# Patient Record
Sex: FEMALE | Race: WHITE | NOT HISPANIC OR LATINO | Employment: FULL TIME | URBAN - METROPOLITAN AREA
[De-identification: names, ages, dates, MRNs, and addresses within clinical notes are randomized per-mention and may not be internally consistent; named-entity substitution may affect disease eponyms.]

---

## 2019-04-03 ENCOUNTER — APPOINTMENT (OUTPATIENT)
Dept: WOMENS IMAGING | Facility: HOSPITAL | Age: 44
End: 2019-04-03

## 2019-04-03 PROCEDURE — 77067 SCR MAMMO BI INCL CAD: CPT | Performed by: RADIOLOGY

## 2020-05-04 ENCOUNTER — APPOINTMENT (OUTPATIENT)
Dept: WOMENS IMAGING | Facility: HOSPITAL | Age: 45
End: 2020-05-04

## 2020-05-04 PROCEDURE — 77067 SCR MAMMO BI INCL CAD: CPT | Performed by: RADIOLOGY

## 2021-06-03 ENCOUNTER — APPOINTMENT (OUTPATIENT)
Dept: WOMENS IMAGING | Facility: HOSPITAL | Age: 46
End: 2021-06-03

## 2021-06-03 PROCEDURE — 77067 SCR MAMMO BI INCL CAD: CPT | Performed by: RADIOLOGY

## 2022-04-27 ENCOUNTER — TELEMEDICINE (OUTPATIENT)
Dept: FAMILY MEDICINE CLINIC | Facility: CLINIC | Age: 47
End: 2022-04-27

## 2022-04-27 DIAGNOSIS — R05.9 COUGH: ICD-10-CM

## 2022-04-27 DIAGNOSIS — J30.2 SEASONAL ALLERGIC RHINITIS, UNSPECIFIED TRIGGER: Primary | ICD-10-CM

## 2022-04-27 PROCEDURE — 99213 OFFICE O/P EST LOW 20 MIN: CPT | Performed by: STUDENT IN AN ORGANIZED HEALTH CARE EDUCATION/TRAINING PROGRAM

## 2022-04-27 RX ORDER — TRIAMCINOLONE ACETONIDE 40 MG/ML
40 INJECTION, SUSPENSION INTRA-ARTICULAR; INTRAMUSCULAR ONCE
Status: DISCONTINUED | OUTPATIENT
Start: 2022-04-27 | End: 2023-01-19

## 2022-04-27 NOTE — ASSESSMENT & PLAN NOTE
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 Tylenol/Motrin for pain/fever. OTC cough and cold medication for symptoms. Nasal saline spray recommended. Cool mist humidifier at the bedside. RTC with new or worsening symptoms.

## 2022-04-27 NOTE — PROGRESS NOTES
Chief Complaint  No chief complaint on file.    Myrna Marte presents to [facility]  History of Present Illness    Unable to log into [employer] video visit.  Video visit was conducted with the use of [email or web address]    Patient states that she has been having severe allergies symptoms. She states that she has had cough, congestion, and sore throat. She has been using Mucinex, tylenol for the symptoms. Denies fever or chills. No known COVID 19 exposure. She has had her covid 19 vaccine, no booster.       Objective   Vital Signs:   There were no vitals taken for this visit.    There is no height or weight on file to calculate BMI.    Review of Systems    Past History:  Medical History: has a past medical history of Asthma, COPD (chronic obstructive pulmonary disease) (HCC), and Depression.   Surgical History: has a past surgical history that includes Gastrectomy.   Family History: family history includes COPD in an other family member; Diabetes in an other family member.   Social History: reports that she has been smoking. She does not have any smokeless tobacco history on file.    No current outpatient medications on file.    Allergies: Patient has no allergy information on record.    Physical Exam  Constitutional:       General: She is not in acute distress.     Appearance: She is not toxic-appearing.      Comments: Limited 2/2 Virtual visit.    HENT:      Head: Normocephalic and atraumatic.   Pulmonary:      Effort: Pulmonary effort is normal.      Comments: Speaking in complete sentences. No audible wheezing  Neurological:      Mental Status: She is alert and oriented to person, place, and time.   Psychiatric:         Mood and Affect: Mood normal.         Thought Content: Thought content normal.          Result Review :                   Assessment and Plan    Diagnoses and all orders for this visit:    1. 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 Tylenol/Motrin for pain/fever. OTC cough and cold medication for symptoms. Nasal saline spray recommended. Cool mist humidifier at the bedside. RTC with new or worsening symptoms.      Discussed limitations to virtual visit and patient was agreeable to continue. Discussed that because of the limitations of minimal physical exam that if there is interval worsening they should present to the office for either curbside visit, or to the emergency department for further evaluation and definitive management. Patient was agreeable to this.    I spent 13 minutes caring for Ashlee on this date of service. This time includes time spent by me in the following activities:preparing for the visit, obtaining and/or reviewing a separately obtained history, counseling and educating the patient/family/caregiver, ordering medications, tests, or procedures and documenting information in the medical record  Follow Up   No follow-ups on file.  Patient was given instructions and counseling regarding her condition or for health maintenance advice. Please see specific information pulled into the AVS if appropriate.     Carolyne Dixon, DO

## 2022-04-28 LAB
LABCORP SARS-COV-2, NAA 2 DAY TAT: NORMAL
SARS-COV-2 RNA RESP QL NAA+PROBE: NOT DETECTED

## 2022-05-03 ENCOUNTER — PATIENT MESSAGE (OUTPATIENT)
Dept: FAMILY MEDICINE CLINIC | Facility: CLINIC | Age: 47
End: 2022-05-03

## 2022-05-03 RX ORDER — ERGOCALCIFEROL 1.25 MG/1
CAPSULE ORAL
Qty: 12 CAPSULE | Refills: 0 | Status: SHIPPED | OUTPATIENT
Start: 2022-05-03 | End: 2023-02-21

## 2022-05-03 NOTE — TELEPHONE ENCOUNTER
VitD request, hasn't been seen since 09/21. No labs since 09/21. We need to recheck this level to make sure medication is needed. Wen said okay to send 1 RF but probably needs a physical for further.

## 2022-05-05 RX ORDER — AMOXICILLIN AND CLAVULANATE POTASSIUM 875; 125 MG/1; MG/1
1 TABLET, FILM COATED ORAL 2 TIMES DAILY
Qty: 20 TABLET | Refills: 0 | Status: SHIPPED | OUTPATIENT
Start: 2022-05-05 | End: 2022-08-15

## 2022-05-06 ENCOUNTER — OFFICE VISIT (OUTPATIENT)
Dept: FAMILY MEDICINE CLINIC | Facility: CLINIC | Age: 47
End: 2022-05-06

## 2022-05-06 DIAGNOSIS — R51.9 FACIAL PAIN: Primary | ICD-10-CM

## 2022-05-06 PROCEDURE — 99214 OFFICE O/P EST MOD 30 MIN: CPT | Performed by: FAMILY MEDICINE

## 2022-05-06 RX ORDER — HYDROCODONE BITARTRATE AND ACETAMINOPHEN 7.5; 325 MG/1; MG/1
1 TABLET ORAL EVERY 6 HOURS PRN
Qty: 12 TABLET | Refills: 0 | Status: SHIPPED | OUTPATIENT
Start: 2022-05-06 | End: 2022-08-15

## 2022-05-07 NOTE — PROGRESS NOTES
2Follow Up Office Visit      Date of Visit:  2022   Patient Name: Ashlee Marte  : 1975   MRN: [id number]     Chief Complaint:    Chief Complaint   Patient presents with   • Dental Pain       History of Present Illness: Ashlee Marte is a 46 y.o. female who is here today for evaluation of facial pain.  Not sure if this is related more to her jaw or tooth.  Present for the past few days.  HPI      Subjective      Review of Systems:   Review of Systems   Constitutional: Negative for fatigue and fever.   HENT: Positive for facial swelling. Negative for congestion and ear pain.    Respiratory: Negative for apnea, cough, chest tightness and shortness of breath.    Cardiovascular: Negative for chest pain.   Gastrointestinal: Negative for abdominal pain, constipation, diarrhea and nausea.   Musculoskeletal: Negative for arthralgias.   Psychiatric/Behavioral: Negative for depressed mood and stress.       Past Medical History:   Past Medical History:   Diagnosis Date   • Asthma    • COPD (chronic obstructive pulmonary disease) ([facility])    • Depression     MEDS       Past Surgical History:   Past Surgical History:   Procedure Laterality Date   • GASTRECTOMY      SLEEVE       Family History:   Family History   Problem Relation Age of Onset   • Diabetes Other    • COPD Other        Social History:   Social History     Socioeconomic History   • Marital status: Unknown   Tobacco Use   • Smoking status: Current Every Day Smoker       Medications:     Current Outpatient Medications:   •  amoxicillin-clavulanate (AUGMENTIN) 875-125 MG per tablet, Take 1 tablet by mouth 2 (Two) Times a Day., Disp: 20 tablet, Rfl: 0  •  vitamin D (ERGOCALCIFEROL) 1.25 MG (88687 UT) capsule capsule, TAKE 1 CAPSULE BY MOUTH  ONCE WEEKLY, Disp: 12 capsule, Rfl: 0  •  HYDROcodone-acetaminophen (NORCO) 7.5-325 MG per tablet, Take 1 tablet by mouth Every 6 (Six) Hours As Needed for Moderate Pain ., Disp: 12 tablet, Rfl: 0    Current  Facility-Administered Medications:   •  triamcinolone acetonide (KENALOG-40) injection 40 mg, 40 mg, Intramuscular, Once, Carolyne Dixon DO    Allergies:   No Known Allergies    Objective     Physical Exam:  Vital Signs: There were no vitals filed for this visit.  There is no height or weight on file to calculate BMI.     Physical Exam  Vitals and nursing note reviewed.   Constitutional:       General: She is not in acute distress.     Appearance: Normal appearance. She is not ill-appearing.   HENT:      Head: Normocephalic and atraumatic.      Right Ear: Tympanic membrane and ear canal normal.      Left Ear: Tympanic membrane and ear canal normal.      Nose: Nose normal.   Cardiovascular:      Rate and Rhythm: Normal rate and regular rhythm.      Heart sounds: Normal heart sounds.   Pulmonary:      Effort: Pulmonary effort is normal.      Breath sounds: Normal breath sounds.   Neurological:      Mental Status: She is alert and oriented to person, place, and time. Mental status is at baseline.   Psychiatric:         Mood and Affect: Mood normal.         Procedures    BMI has not been calculated during today's encounter.        Assessment / Plan      Assessment/Plan:   Diagnoses and all orders for this visit:    1. Facial pain (Primary)  -     HYDROcodone-acetaminophen (NORCO) 7.5-325 MG per tablet; Take 1 tablet by mouth Every 6 (Six) Hours As Needed for Moderate Pain .  Dispense: 12 tablet; Refill: 0         Medication given for facial pain.  On antibiotics in case it is related to sinuses or a tooth.  Follow-up with us in a few days if not improved.    Follow Up:   No follow-ups on file.    Tomy Landry  [facility] [place]

## 2022-05-10 ENCOUNTER — TELEPHONE (OUTPATIENT)
Dept: FAMILY MEDICINE CLINIC | Facility: CLINIC | Age: 47
End: 2022-05-10

## 2022-05-10 RX ORDER — FLUCONAZOLE 150 MG/1
150 TABLET ORAL ONCE
Qty: 1 TABLET | Refills: 0 | Status: SHIPPED | OUTPATIENT
Start: 2022-05-10 | End: 2022-05-16

## 2022-05-13 ENCOUNTER — OFFICE VISIT (OUTPATIENT)
Dept: FAMILY MEDICINE CLINIC | Facility: CLINIC | Age: 47
End: 2022-05-13

## 2022-05-13 VITALS
DIASTOLIC BLOOD PRESSURE: 90 MMHG | BODY MASS INDEX: 37.22 KG/M2 | HEART RATE: 78 BPM | RESPIRATION RATE: 16 BRPM | SYSTOLIC BLOOD PRESSURE: 160 MMHG | WEIGHT: 218 LBS | HEIGHT: 64 IN | OXYGEN SATURATION: 97 %

## 2022-05-13 DIAGNOSIS — E56.9 VITAMIN DEFICIENCY: ICD-10-CM

## 2022-05-13 DIAGNOSIS — J44.9 CHRONIC OBSTRUCTIVE PULMONARY DISEASE, UNSPECIFIED COPD TYPE: ICD-10-CM

## 2022-05-13 DIAGNOSIS — K21.9 GASTROESOPHAGEAL REFLUX DISEASE WITHOUT ESOPHAGITIS: ICD-10-CM

## 2022-05-13 DIAGNOSIS — Z00.00 ROUTINE MEDICAL EXAM: Primary | ICD-10-CM

## 2022-05-13 DIAGNOSIS — E66.09 CLASS 2 OBESITY DUE TO EXCESS CALORIES WITHOUT SERIOUS COMORBIDITY WITH BODY MASS INDEX (BMI) OF 37.0 TO 37.9 IN ADULT: ICD-10-CM

## 2022-05-13 DIAGNOSIS — J30.1 ALLERGIC RHINITIS DUE TO POLLEN, UNSPECIFIED SEASONALITY: ICD-10-CM

## 2022-05-13 DIAGNOSIS — I10 PRIMARY HYPERTENSION: ICD-10-CM

## 2022-05-13 DIAGNOSIS — Z13.220 SCREENING FOR LIPID DISORDERS: ICD-10-CM

## 2022-05-13 DIAGNOSIS — Z13.1 SCREENING FOR DIABETES MELLITUS: ICD-10-CM

## 2022-05-13 PROCEDURE — 99396 PREV VISIT EST AGE 40-64: CPT | Performed by: NURSE PRACTITIONER

## 2022-05-13 RX ORDER — LEVOCETIRIZINE DIHYDROCHLORIDE 5 MG/1
5 TABLET, FILM COATED ORAL EVERY EVENING
Qty: 90 TABLET | Refills: 3 | Status: SHIPPED | OUTPATIENT
Start: 2022-05-13 | End: 2023-02-21

## 2022-05-13 RX ORDER — BUDESONIDE AND FORMOTEROL FUMARATE DIHYDRATE 80; 4.5 UG/1; UG/1
2 AEROSOL RESPIRATORY (INHALATION)
Qty: 10.2 G | Refills: 12 | Status: SHIPPED | OUTPATIENT
Start: 2022-05-13 | End: 2023-01-19 | Stop reason: SDUPTHER

## 2022-05-13 RX ORDER — ALBUTEROL SULFATE 90 UG/1
2 AEROSOL, METERED RESPIRATORY (INHALATION) EVERY 4 HOURS PRN
Qty: 18 G | Refills: 5 | Status: SHIPPED | OUTPATIENT
Start: 2022-05-13 | End: 2023-01-19 | Stop reason: SDUPTHER

## 2022-05-13 RX ORDER — OMEPRAZOLE 40 MG/1
40 CAPSULE, DELAYED RELEASE ORAL 2 TIMES DAILY
COMMUNITY
End: 2023-01-13 | Stop reason: SDUPTHER

## 2022-05-13 RX ORDER — PHENTERMINE HYDROCHLORIDE 37.5 MG/1
37.5 TABLET ORAL
Qty: 30 TABLET | Refills: 0 | Status: SHIPPED | OUTPATIENT
Start: 2022-05-13 | End: 2022-06-14 | Stop reason: SDUPTHER

## 2022-05-13 RX ORDER — MONTELUKAST SODIUM 10 MG/1
10 TABLET ORAL NIGHTLY
Qty: 90 TABLET | Refills: 3 | Status: SHIPPED | OUTPATIENT
Start: 2022-05-13

## 2022-05-13 RX ORDER — FLUOXETINE HYDROCHLORIDE 40 MG/1
CAPSULE ORAL
COMMUNITY
Start: 2022-03-01 | End: 2022-10-03 | Stop reason: SDUPTHER

## 2022-05-13 NOTE — PROGRESS NOTES
"Chief Complaint  No chief complaint on file.    Myrna Marte presents to [facility] for preventative yearly exam.   Patient is here for physical exam and medication refills.  He is doing well today with no major complaints.  She has had increased allergy symptoms and nonproductive cough.  She has not been watching her diet closely for the past few months.  She has tried diet and exercise alone without success.  She would like to try phentermine to help with weight loss.      Objective   Vital Signs:   /90 (BP Location: Left arm)   Pulse 78   Resp 16   Ht 162.6 cm (64\")   Wt 98.9 kg (218 lb)   SpO2 97%   BMI 37.42 kg/m²     Body mass index is 37.42 kg/m².    Predictive Model Details   No score data available for Risk of Fall        PHQ-9 Depression Screening  Little interest or pleasure in doing things?     Feeling down, depressed, or hopeless?     Trouble falling or staying asleep, or sleeping too much?     Feeling tired or having little energy?     Poor appetite or overeating?     Feeling bad about yourself - or that you are a failure or have let yourself or your family down?     Trouble concentrating on things, such as reading the newspaper or watching television?     Moving or speaking so slowly that other people could have noticed? Or the opposite - being so fidgety or restless that you have been moving around a lot more than usual?     Thoughts that you would be better off dead, or of hurting yourself in some way?     PHQ-9 Total Score     If you checked off any problems, how difficult have these problems made it for you to do your work, take care of things at home, or get along with other people?       Health Maintenance   Topic Date Due   • COLORECTAL CANCER SCREENING  Never done   • ANNUAL PHYSICAL  Never done   • COVID-19 Vaccine (1) Never done   • Pneumococcal Vaccine 0-64 (1 - PCV) Never done   • TDAP/TD VACCINES (2 - Td or Tdap) 08/21/2018 "   • HEPATITIS C SCREENING  Never done   • PAP SMEAR  Never done   • INFLUENZA VACCINE  08/01/2022        Immunization History   Administered Date(s) Administered   • Hepatitis A 08/30/2018, 04/04/2019   • Hepatitis B 12/14/2021, 01/14/2022   • Influenza, Unspecified 10/19/2021   • MMR 12/16/2021, 02/07/2022   • Tdap 08/21/2008       Review of Systems   Constitutional: Negative for fatigue and fever.   Respiratory: Negative for shortness of breath.    Cardiovascular: Negative for chest pain, palpitations and leg swelling.   Neurological: Negative for syncope.   Psychiatric/Behavioral: The patient is not nervous/anxious.         Negative depression        Past History:  Medical History: has a past medical history of Asthma, COPD (chronic obstructive pulmonary disease) (HCC), and Depression.   Surgical History: has a past surgical history that includes Gastrectomy.   Family History: family history includes COPD in an other family member; Diabetes in an other family member.   Social History: reports that she has been smoking. She does not have any smokeless tobacco history on file.       Allergies: Patient has no known allergies.    Physical Exam  Constitutional:       Appearance: Normal appearance.   HENT:      Head: Normocephalic.   Eyes:      Conjunctiva/sclera: Conjunctivae normal.      Pupils: Pupils are equal, round, and reactive to light.   Cardiovascular:      Rate and Rhythm: Normal rate and regular rhythm.      Heart sounds: Normal heart sounds.   Pulmonary:      Effort: Pulmonary effort is normal.      Breath sounds: Normal breath sounds.   Abdominal:      Tenderness: There is no abdominal tenderness.   Musculoskeletal:         General: Normal range of motion.   Skin:     General: Skin is warm and dry.      Capillary Refill: Capillary refill takes less than 2 seconds.   Neurological:      General: No focal deficit present.      Mental Status: She is alert and oriented to person, place, and time.    Psychiatric:         Mood and Affect: Mood normal.         Behavior: Behavior normal.         Thought Content: Thought content normal.         Judgment: Judgment normal.          Result Review :                   Assessment and Plan    Diagnoses and all orders for this visit:    1. Routine medical exam (Primary)  -     CBC & Differential; Future  -     Comprehensive Metabolic Panel; Future  -     TSH Rfx On Abnormal To Free T4; Future    2. Screening for lipid disorders  -     Lipid Panel; Future    3. Screening for diabetes mellitus  -     Hemoglobin A1c; Future    4. Vitamin deficiency  -     Vitamin B12; Future  -     Vitamin D 25 Hydroxy; Future  -     Folate; Future    5. Allergic rhinitis due to pollen, unspecified seasonality  Comments:  Start Singulair and Xyzal.  Return for worsening symptoms.  Orders:  -     montelukast (Singulair) 10 MG tablet; Take 1 tablet by mouth Every Night.  Dispense: 90 tablet; Refill: 3  -     levocetirizine (Xyzal) 5 MG tablet; Take 1 tablet by mouth Every Evening.  Dispense: 90 tablet; Refill: 3    6. Gastroesophageal reflux disease without esophagitis  Comments:  Continue current medications.    7. Chronic obstructive pulmonary disease, unspecified COPD type (HCC)  Comments:  Restart inhalers.  Albuterol as needed for wheezing.  Start Singulair.  Orders:  -     albuterol sulfate  (90 Base) MCG/ACT inhaler; Inhale 2 puffs Every 4 (Four) Hours As Needed for Wheezing.  Dispense: 18 g; Refill: 5  -     budesonide-formoterol (Symbicort) 80-4.5 MCG/ACT inhaler; Inhale 2 puffs 2 (Two) Times a Day.  Dispense: 10.2 g; Refill: 12    8. Class 2 obesity due to excess calories without serious comorbidity with body mass index (BMI) of 37.0 to 37.9 in adult  Comments:  We discussed diet and exercise.  Restart phentermine for added weight loss.  Return in 1 month for follow-up.  Orders:  -     phentermine (Adipex-P) 37.5 MG tablet; Take 1 tablet by mouth Every Morning Before  Breakfast.  Dispense: 30 tablet; Refill: 0    9. Primary hypertension  Comments:  We discussed diet and exercise.  Monitor blood pressure.  We may need to restart blood pressure meds.          BMI has not been calculated during today's encounter.          Current Outpatient Medications:   •  albuterol sulfate  (90 Base) MCG/ACT inhaler, Inhale 2 puffs Every 4 (Four) Hours As Needed for Wheezing., Disp: 18 g, Rfl: 5  •  amoxicillin-clavulanate (AUGMENTIN) 875-125 MG per tablet, Take 1 tablet by mouth 2 (Two) Times a Day., Disp: 20 tablet, Rfl: 0  •  budesonide-formoterol (Symbicort) 80-4.5 MCG/ACT inhaler, Inhale 2 puffs 2 (Two) Times a Day., Disp: 10.2 g, Rfl: 12  •  HYDROcodone-acetaminophen (NORCO) 7.5-325 MG per tablet, Take 1 tablet by mouth Every 6 (Six) Hours As Needed for Moderate Pain ., Disp: 12 tablet, Rfl: 0  •  levocetirizine (Xyzal) 5 MG tablet, Take 1 tablet by mouth Every Evening., Disp: 90 tablet, Rfl: 3  •  montelukast (Singulair) 10 MG tablet, Take 1 tablet by mouth Every Night., Disp: 90 tablet, Rfl: 3  •  omeprazole (priLOSEC) 40 MG capsule, Take 40 mg by mouth 2 (Two) Times a Day., Disp: , Rfl:   •  phentermine (Adipex-P) 37.5 MG tablet, Take 1 tablet by mouth Every Morning Before Breakfast., Disp: 30 tablet, Rfl: 0  •  vitamin D (ERGOCALCIFEROL) 1.25 MG (52181 UT) capsule capsule, TAKE 1 CAPSULE BY MOUTH  ONCE WEEKLY, Disp: 12 capsule, Rfl: 0    Current Facility-Administered Medications:   •  triamcinolone acetonide (KENALOG-40) injection 40 mg, 40 mg, Intramuscular, Once, Carolyne Dixon, DO    Follow Up   Return in about 1 month (around 6/13/2022) for Recheck.  Patient was given instructions and counseling regarding her condition or for health maintenance advice. Please see specific information pulled into the AVS if appropriate.     SHAVON Mcclain

## 2022-05-16 ENCOUNTER — LAB (OUTPATIENT)
Dept: FAMILY MEDICINE CLINIC | Facility: CLINIC | Age: 47
End: 2022-05-16

## 2022-05-16 DIAGNOSIS — Z00.00 ROUTINE MEDICAL EXAM: ICD-10-CM

## 2022-05-16 DIAGNOSIS — E56.9 VITAMIN DEFICIENCY: ICD-10-CM

## 2022-05-16 DIAGNOSIS — Z13.1 SCREENING FOR DIABETES MELLITUS: ICD-10-CM

## 2022-05-16 DIAGNOSIS — Z13.220 SCREENING FOR LIPID DISORDERS: ICD-10-CM

## 2022-05-16 PROCEDURE — 36415 COLL VENOUS BLD VENIPUNCTURE: CPT | Performed by: NURSE PRACTITIONER

## 2022-05-16 RX ORDER — FLUCONAZOLE 150 MG/1
TABLET ORAL
Qty: 1 TABLET | Refills: 0 | Status: SHIPPED | OUTPATIENT
Start: 2022-05-16 | End: 2023-01-19

## 2022-05-17 LAB
25(OH)D3+25(OH)D2 SERPL-MCNC: 23.5 NG/ML (ref 30–100)
ALBUMIN SERPL-MCNC: 3.9 G/DL (ref 3.8–4.8)
ALBUMIN/GLOB SERPL: 1.3 {RATIO} (ref 1.2–2.2)
ALP SERPL-CCNC: 77 IU/L (ref 44–121)
ALT SERPL-CCNC: 9 IU/L (ref 0–32)
AST SERPL-CCNC: 13 IU/L (ref 0–40)
BASOPHILS # BLD AUTO: 0 X10E3/UL (ref 0–0.2)
BASOPHILS NFR BLD AUTO: 1 %
BILIRUB SERPL-MCNC: <0.2 MG/DL (ref 0–1.2)
BUN SERPL-MCNC: 12 MG/DL (ref 6–24)
BUN/CREAT SERPL: 15 (ref 9–23)
CALCIUM SERPL-MCNC: 9.1 MG/DL (ref 8.7–10.2)
CHLORIDE SERPL-SCNC: 102 MMOL/L (ref 96–106)
CHOLEST SERPL-MCNC: 214 MG/DL (ref 100–199)
CO2 SERPL-SCNC: 22 MMOL/L (ref 20–29)
CREAT SERPL-MCNC: 0.78 MG/DL (ref 0.57–1)
EGFRCR SERPLBLD CKD-EPI 2021: 94 ML/MIN/1.73
EOSINOPHIL # BLD AUTO: 0.1 X10E3/UL (ref 0–0.4)
EOSINOPHIL NFR BLD AUTO: 2 %
ERYTHROCYTE [DISTWIDTH] IN BLOOD BY AUTOMATED COUNT: 13.9 % (ref 11.7–15.4)
FOLATE SERPL-MCNC: 5.7 NG/ML
GLOBULIN SER CALC-MCNC: 2.9 G/DL (ref 1.5–4.5)
GLUCOSE SERPL-MCNC: 101 MG/DL (ref 65–99)
HBA1C MFR BLD: 5.4 % (ref 4.8–5.6)
HCT VFR BLD AUTO: 40.7 % (ref 34–46.6)
HDLC SERPL-MCNC: 43 MG/DL
HGB BLD-MCNC: 13 G/DL (ref 11.1–15.9)
IMM GRANULOCYTES # BLD AUTO: 0 X10E3/UL (ref 0–0.1)
IMM GRANULOCYTES NFR BLD AUTO: 0 %
LDLC SERPL CALC-MCNC: 138 MG/DL (ref 0–99)
LYMPHOCYTES # BLD AUTO: 2 X10E3/UL (ref 0.7–3.1)
LYMPHOCYTES NFR BLD AUTO: 24 %
MCH RBC QN AUTO: 27.1 PG (ref 26.6–33)
MCHC RBC AUTO-ENTMCNC: 31.9 G/DL (ref 31.5–35.7)
MCV RBC AUTO: 85 FL (ref 79–97)
MONOCYTES # BLD AUTO: 0.7 X10E3/UL (ref 0.1–0.9)
MONOCYTES NFR BLD AUTO: 9 %
NEUTROPHILS # BLD AUTO: 5.3 X10E3/UL (ref 1.4–7)
NEUTROPHILS NFR BLD AUTO: 64 %
PLATELET # BLD AUTO: 387 X10E3/UL (ref 150–450)
POTASSIUM SERPL-SCNC: 3.9 MMOL/L (ref 3.5–5.2)
PROT SERPL-MCNC: 6.8 G/DL (ref 6–8.5)
RBC # BLD AUTO: 4.79 X10E6/UL (ref 3.77–5.28)
SODIUM SERPL-SCNC: 141 MMOL/L (ref 134–144)
TRIGL SERPL-MCNC: 181 MG/DL (ref 0–149)
TSH SERPL DL<=0.005 MIU/L-ACNC: 1.51 UIU/ML (ref 0.45–4.5)
VIT B12 SERPL-MCNC: 726 PG/ML (ref 232–1245)
VLDLC SERPL CALC-MCNC: 33 MG/DL (ref 5–40)
WBC # BLD AUTO: 8.2 X10E3/UL (ref 3.4–10.8)

## 2022-05-19 NOTE — PROGRESS NOTES
Your vitamin D was low so take 2,000 units of vitamin D per day. You cholesterol levels were a bit elevated so try to watch your diet more closely for sugars, fats, and carbohydrates. Everything else was good.  Take care!

## 2022-06-14 ENCOUNTER — OFFICE VISIT (OUTPATIENT)
Dept: FAMILY MEDICINE CLINIC | Facility: CLINIC | Age: 47
End: 2022-06-14

## 2022-06-14 VITALS
WEIGHT: 206 LBS | OXYGEN SATURATION: 96 % | SYSTOLIC BLOOD PRESSURE: 130 MMHG | HEART RATE: 90 BPM | HEIGHT: 64 IN | DIASTOLIC BLOOD PRESSURE: 68 MMHG | BODY MASS INDEX: 35.17 KG/M2

## 2022-06-14 DIAGNOSIS — E66.09 CLASS 2 OBESITY DUE TO EXCESS CALORIES WITHOUT SERIOUS COMORBIDITY WITH BODY MASS INDEX (BMI) OF 37.0 TO 37.9 IN ADULT: ICD-10-CM

## 2022-06-14 PROCEDURE — 99213 OFFICE O/P EST LOW 20 MIN: CPT | Performed by: NURSE PRACTITIONER

## 2022-06-14 RX ORDER — PHENTERMINE HYDROCHLORIDE 37.5 MG/1
37.5 TABLET ORAL
Qty: 30 TABLET | Refills: 0 | Status: SHIPPED | OUTPATIENT
Start: 2022-06-14 | End: 2022-07-19 | Stop reason: SDUPTHER

## 2022-06-14 NOTE — PROGRESS NOTES
"Chief Complaint  Weight Check    Subjective          Ashlee Marte presents to [facility]  Patient is here to follow-up on weight management.  She has been taking phentermine and doing well.  She denies side effects.  She is been watching her portions and her carbohydrates.      Objective   Vital Signs:   /68   Pulse 90   Ht 162.6 cm (64\")   Wt 93.4 kg (206 lb)   SpO2 96%   BMI 35.36 kg/m²     Body mass index is 35.36 kg/m².    Review of Systems   Constitutional: Negative for fatigue and fever.   Respiratory: Negative for shortness of breath.    Cardiovascular: Negative for chest pain, palpitations and leg swelling.   Neurological: Negative for syncope.   Psychiatric/Behavioral: The patient is not nervous/anxious.           Current Outpatient Medications:   •  albuterol sulfate  (90 Base) MCG/ACT inhaler, Inhale 2 puffs Every 4 (Four) Hours As Needed for Wheezing., Disp: 18 g, Rfl: 5  •  budesonide-formoterol (Symbicort) 80-4.5 MCG/ACT inhaler, Inhale 2 puffs 2 (Two) Times a Day., Disp: 10.2 g, Rfl: 12  •  FLUoxetine (PROzac) 40 MG capsule, , Disp: , Rfl:   •  omeprazole (priLOSEC) 40 MG capsule, Take 40 mg by mouth 2 (Two) Times a Day., Disp: , Rfl:   •  phentermine (Adipex-P) 37.5 MG tablet, Take 1 tablet by mouth Every Morning Before Breakfast., Disp: 30 tablet, Rfl: 0  •  vitamin D (ERGOCALCIFEROL) 1.25 MG (46290 UT) capsule capsule, TAKE 1 CAPSULE BY MOUTH  ONCE WEEKLY, Disp: 12 capsule, Rfl: 0  •  amoxicillin-clavulanate (AUGMENTIN) 875-125 MG per tablet, Take 1 tablet by mouth 2 (Two) Times a Day., Disp: 20 tablet, Rfl: 0  •  fluconazole (DIFLUCAN) 150 MG tablet, TAKE ONE TABLET BY MOUTH IN A SINGLE DOSE, Disp: 1 tablet, Rfl: 0  •  HYDROcodone-acetaminophen (NORCO) 7.5-325 MG per tablet, Take 1 tablet by mouth Every 6 (Six) Hours As Needed for Moderate Pain ., Disp: 12 tablet, Rfl: 0  •  levocetirizine (Xyzal) 5 MG tablet, Take 1 tablet by mouth Every Evening., " Disp: 90 tablet, Rfl: 3  •  montelukast (Singulair) 10 MG tablet, Take 1 tablet by mouth Every Night., Disp: 90 tablet, Rfl: 3    Current Facility-Administered Medications:   •  triamcinolone acetonide (KENALOG-40) injection 40 mg, 40 mg, Intramuscular, Once, Carolyne Dixon DO      Allergies: Patient has no known allergies.    Physical Exam  Constitutional:       Appearance: Normal appearance.   HENT:      Head: Normocephalic.   Eyes:      Conjunctiva/sclera: Conjunctivae normal.      Pupils: Pupils are equal, round, and reactive to light.   Cardiovascular:      Rate and Rhythm: Normal rate and regular rhythm.      Heart sounds: Normal heart sounds.   Pulmonary:      Effort: Pulmonary effort is normal.      Breath sounds: Normal breath sounds.   Abdominal:      Tenderness: There is no abdominal tenderness.   Musculoskeletal:         General: Normal range of motion.   Skin:     General: Skin is warm and dry.      Capillary Refill: Capillary refill takes less than 2 seconds.   Neurological:      General: No focal deficit present.      Mental Status: She is alert and oriented to person, place, and time.   Psychiatric:         Mood and Affect: Mood normal.         Behavior: Behavior normal.         Thought Content: Thought content normal.         Judgment: Judgment normal.          Result Review :                   Assessment and Plan    Diagnoses and all orders for this visit:    1. Class 2 obesity due to excess calories without serious comorbidity with body mass index (BMI) of 37.0 to 37.9 in adult  Comments:  We discussed diet and exercise.  Continue phentermine for added weight loss.  Return in 1 month for follow-up.  Orders:  -     phentermine (Adipex-P) 37.5 MG tablet; Take 1 tablet by mouth Every Morning Before Breakfast.  Dispense: 30 tablet; Refill: 0               Follow Up   Return in about 1 month (around 7/14/2022) for Recheck.  Patient was given instructions and counseling regarding her condition or  for health maintenance advice. Please see specific information pulled into the AVS if appropriate.     SHAVON Mcclain

## 2022-07-19 ENCOUNTER — OFFICE VISIT (OUTPATIENT)
Dept: FAMILY MEDICINE CLINIC | Facility: CLINIC | Age: 47
End: 2022-07-19

## 2022-07-19 VITALS — HEIGHT: 64 IN | BODY MASS INDEX: 34.31 KG/M2 | WEIGHT: 201 LBS

## 2022-07-19 DIAGNOSIS — E66.09 CLASS 2 OBESITY DUE TO EXCESS CALORIES WITHOUT SERIOUS COMORBIDITY WITH BODY MASS INDEX (BMI) OF 37.0 TO 37.9 IN ADULT: Primary | ICD-10-CM

## 2022-07-19 PROCEDURE — 99213 OFFICE O/P EST LOW 20 MIN: CPT | Performed by: NURSE PRACTITIONER

## 2022-07-19 RX ORDER — PHENTERMINE HYDROCHLORIDE 37.5 MG/1
37.5 TABLET ORAL
Qty: 30 TABLET | Refills: 0 | Status: SHIPPED | OUTPATIENT
Start: 2022-07-19 | End: 2023-02-21

## 2022-07-19 NOTE — PROGRESS NOTES
"Chief Complaint  Med Refill    Subjective          Ashlee Marte presents to [facility]  Pt is here for a refills on her medications and to follow up on weight management. She has been taking Phentermine and doing well. She has been watching her diet for sugars and carbohydrates.       Objective   Vital Signs:   Ht 162.6 cm (64\")   Wt 91.2 kg (201 lb)   BMI 34.50 kg/m²     Body mass index is 34.5 kg/m².    Review of Systems   Constitutional: Negative for fatigue and fever.   Respiratory: Negative for shortness of breath.    Cardiovascular: Negative for chest pain, palpitations and leg swelling.   Neurological: Negative for syncope.   Psychiatric/Behavioral: The patient is not nervous/anxious.           Current Outpatient Medications:   •  phentermine (Adipex-P) 37.5 MG tablet, Take 1 tablet by mouth Every Morning Before Breakfast., Disp: 30 tablet, Rfl: 0  •  albuterol sulfate  (90 Base) MCG/ACT inhaler, Inhale 2 puffs Every 4 (Four) Hours As Needed for Wheezing., Disp: 18 g, Rfl: 5  •  amoxicillin-clavulanate (AUGMENTIN) 875-125 MG per tablet, Take 1 tablet by mouth 2 (Two) Times a Day., Disp: 20 tablet, Rfl: 0  •  budesonide-formoterol (Symbicort) 80-4.5 MCG/ACT inhaler, Inhale 2 puffs 2 (Two) Times a Day., Disp: 10.2 g, Rfl: 12  •  fluconazole (DIFLUCAN) 150 MG tablet, TAKE ONE TABLET BY MOUTH IN A SINGLE DOSE, Disp: 1 tablet, Rfl: 0  •  FLUoxetine (PROzac) 40 MG capsule, , Disp: , Rfl:   •  HYDROcodone-acetaminophen (NORCO) 7.5-325 MG per tablet, Take 1 tablet by mouth Every 6 (Six) Hours As Needed for Moderate Pain ., Disp: 12 tablet, Rfl: 0  •  levocetirizine (Xyzal) 5 MG tablet, Take 1 tablet by mouth Every Evening., Disp: 90 tablet, Rfl: 3  •  montelukast (Singulair) 10 MG tablet, Take 1 tablet by mouth Every Night., Disp: 90 tablet, Rfl: 3  •  omeprazole (priLOSEC) 40 MG capsule, Take 40 mg by mouth 2 (Two) Times a Day., Disp: , Rfl:   •  vitamin D (ERGOCALCIFEROL) " 1.25 MG (06161 UT) capsule capsule, TAKE 1 CAPSULE BY MOUTH  ONCE WEEKLY, Disp: 12 capsule, Rfl: 0    Current Facility-Administered Medications:   •  triamcinolone acetonide (KENALOG-40) injection 40 mg, 40 mg, Intramuscular, Once, Carolyne Dixon DO      Allergies: Patient has no known allergies.    Physical Exam  Constitutional:       Appearance: Normal appearance.   HENT:      Head: Normocephalic.   Eyes:      Conjunctiva/sclera: Conjunctivae normal.      Pupils: Pupils are equal, round, and reactive to light.   Cardiovascular:      Rate and Rhythm: Normal rate and regular rhythm.      Heart sounds: Normal heart sounds.   Pulmonary:      Effort: Pulmonary effort is normal.      Breath sounds: Normal breath sounds.   Abdominal:      Tenderness: There is no abdominal tenderness.   Musculoskeletal:         General: Normal range of motion.   Skin:     General: Skin is warm and dry.      Capillary Refill: Capillary refill takes less than 2 seconds.   Neurological:      General: No focal deficit present.      Mental Status: She is alert and oriented to person, place, and time.   Psychiatric:         Mood and Affect: Mood normal.         Behavior: Behavior normal.         Thought Content: Thought content normal.         Judgment: Judgment normal.          Result Review :                   Assessment and Plan    Diagnoses and all orders for this visit:    1. Class 2 obesity due to excess calories without serious comorbidity with body mass index (BMI) of 37.0 to 37.9 in adult (Primary)  Comments:  We discussed diet and exercise.  Continue phentermine for added weight loss.  Return in 1 month for follow-up.  Orders:  -     phentermine (Adipex-P) 37.5 MG tablet; Take 1 tablet by mouth Every Morning Before Breakfast.  Dispense: 30 tablet; Refill: 0                   Follow Up   Return in about 1 month (around 8/19/2022) for Recheck.  Patient was given instructions and counseling regarding her condition or for health  maintenance advice. Please see specific information pulled into the AVS if appropriate.     SHAVON Mcclain

## 2022-08-15 ENCOUNTER — OFFICE VISIT (OUTPATIENT)
Dept: FAMILY MEDICINE CLINIC | Facility: CLINIC | Age: 47
End: 2022-08-15

## 2022-08-15 DIAGNOSIS — K08.89 PAIN, DENTAL: Primary | ICD-10-CM

## 2022-08-15 PROCEDURE — 99213 OFFICE O/P EST LOW 20 MIN: CPT | Performed by: NURSE PRACTITIONER

## 2022-08-15 RX ORDER — AMOXICILLIN 875 MG/1
875 TABLET, COATED ORAL 2 TIMES DAILY
Qty: 20 TABLET | Refills: 0 | Status: SHIPPED | OUTPATIENT
Start: 2022-08-15 | End: 2022-11-14

## 2022-08-15 NOTE — PROGRESS NOTES
Chief Complaint  Dental Pain    Subjective          Ashlee Marte presents to [facility]  Pt has had right upper tooth pain for the past few days. She denies fever, chills, or myalgias.       Objective   Vital Signs:   There were no vitals taken for this visit.    There is no height or weight on file to calculate BMI.    Review of Systems   Constitutional: Negative for fatigue and fever.   HENT: Positive for dental problem.    Respiratory: Negative for shortness of breath.    Cardiovascular: Negative for chest pain, palpitations and leg swelling.   Neurological: Negative for syncope.   Psychiatric/Behavioral: The patient is not nervous/anxious.           Current Outpatient Medications:   •  albuterol sulfate  (90 Base) MCG/ACT inhaler, Inhale 2 puffs Every 4 (Four) Hours As Needed for Wheezing., Disp: 18 g, Rfl: 5  •  amoxicillin (AMOXIL) 875 MG tablet, Take 1 tablet by mouth 2 (Two) Times a Day., Disp: 20 tablet, Rfl: 0  •  budesonide-formoterol (Symbicort) 80-4.5 MCG/ACT inhaler, Inhale 2 puffs 2 (Two) Times a Day., Disp: 10.2 g, Rfl: 12  •  fluconazole (DIFLUCAN) 150 MG tablet, TAKE ONE TABLET BY MOUTH IN A SINGLE DOSE, Disp: 1 tablet, Rfl: 0  •  FLUoxetine (PROzac) 40 MG capsule, , Disp: , Rfl:   •  levocetirizine (Xyzal) 5 MG tablet, Take 1 tablet by mouth Every Evening., Disp: 90 tablet, Rfl: 3  •  montelukast (Singulair) 10 MG tablet, Take 1 tablet by mouth Every Night., Disp: 90 tablet, Rfl: 3  •  omeprazole (priLOSEC) 40 MG capsule, Take 40 mg by mouth 2 (Two) Times a Day., Disp: , Rfl:   •  phentermine (Adipex-P) 37.5 MG tablet, Take 1 tablet by mouth Every Morning Before Breakfast., Disp: 30 tablet, Rfl: 0  •  vitamin D (ERGOCALCIFEROL) 1.25 MG (68395 UT) capsule capsule, TAKE 1 CAPSULE BY MOUTH  ONCE WEEKLY, Disp: 12 capsule, Rfl: 0    Current Facility-Administered Medications:   •  triamcinolone acetonide (KENALOG-40) injection 40 mg, 40 mg, Intramuscular, Once,  Carolyne Dixon DO      Allergies: Patient has no known allergies.    Physical Exam  Constitutional:       Appearance: Normal appearance.   HENT:      Head: Normocephalic.      Mouth/Throat:      Comments: Right upper dental pain  Eyes:      Conjunctiva/sclera: Conjunctivae normal.      Pupils: Pupils are equal, round, and reactive to light.   Cardiovascular:      Rate and Rhythm: Normal rate and regular rhythm.      Heart sounds: Normal heart sounds.   Pulmonary:      Effort: Pulmonary effort is normal.      Breath sounds: Normal breath sounds.   Abdominal:      Tenderness: There is no abdominal tenderness.   Musculoskeletal:         General: Normal range of motion.   Skin:     General: Skin is warm and dry.      Capillary Refill: Capillary refill takes less than 2 seconds.   Neurological:      General: No focal deficit present.      Mental Status: She is alert and oriented to person, place, and time.   Psychiatric:         Mood and Affect: Mood normal.         Behavior: Behavior normal.         Thought Content: Thought content normal.         Judgment: Judgment normal.          Result Review :                   Assessment and Plan    Diagnoses and all orders for this visit:    1. Pain, dental (Primary)  Comments:  Finish antibiotics. Follow up with dentist. Return for worsened sx.   Orders:  -     amoxicillin (AMOXIL) 875 MG tablet; Take 1 tablet by mouth 2 (Two) Times a Day.  Dispense: 20 tablet; Refill: 0                Follow Up   No follow-ups on file.  Patient was given instructions and counseling regarding her condition or for health maintenance advice. Please see specific information pulled into the AVS if appropriate.     SHAVON Mcclain

## 2022-10-03 RX ORDER — FLUOXETINE HYDROCHLORIDE 40 MG/1
40 CAPSULE ORAL DAILY
Qty: 90 CAPSULE | Refills: 3 | Status: SHIPPED | OUTPATIENT
Start: 2022-10-03

## 2022-11-14 ENCOUNTER — OFFICE VISIT (OUTPATIENT)
Dept: FAMILY MEDICINE CLINIC | Facility: CLINIC | Age: 47
End: 2022-11-14

## 2022-11-14 VITALS
OXYGEN SATURATION: 98 % | BODY MASS INDEX: 34.5 KG/M2 | HEART RATE: 100 BPM | SYSTOLIC BLOOD PRESSURE: 140 MMHG | DIASTOLIC BLOOD PRESSURE: 90 MMHG | HEIGHT: 64 IN

## 2022-11-14 DIAGNOSIS — H92.03 OTALGIA OF BOTH EARS: Primary | ICD-10-CM

## 2022-11-14 DIAGNOSIS — K08.89 PAIN, DENTAL: ICD-10-CM

## 2022-11-14 PROCEDURE — 99213 OFFICE O/P EST LOW 20 MIN: CPT | Performed by: NURSE PRACTITIONER

## 2022-11-14 RX ORDER — AMOXICILLIN 875 MG/1
875 TABLET, COATED ORAL 2 TIMES DAILY
Qty: 20 TABLET | Refills: 0 | Status: SHIPPED | OUTPATIENT
Start: 2022-11-14 | End: 2023-01-19

## 2022-11-14 NOTE — PROGRESS NOTES
"Chief Complaint  Earache (Left ear) and Dental Pain (/)    Subjective          Ashlee Marte presents to [facility]  History of Present Illness  Pt has had ear pain at times and decreased hearing. She has had dental pain in her right lower tooth. She denies fever, chills, or myalgias.       Objective   Vital Signs:   /90   Pulse 100   Ht 162.6 cm (64\")   SpO2 98%   BMI 34.50 kg/m²     Body mass index is 34.5 kg/m².    Review of Systems   Constitutional: Negative for fatigue and fever.   HENT: Positive for hearing loss. Negative for congestion and ear pain.    Respiratory: Negative for cough and shortness of breath.    Cardiovascular: Negative for chest pain, palpitations and leg swelling.   Neurological: Negative for syncope.   Psychiatric/Behavioral: The patient is not nervous/anxious.           Current Outpatient Medications:   •  budesonide-formoterol (Symbicort) 80-4.5 MCG/ACT inhaler, Inhale 2 puffs 2 (Two) Times a Day., Disp: 10.2 g, Rfl: 12  •  FLUoxetine (PROzac) 40 MG capsule, Take 1 capsule by mouth Daily., Disp: 90 capsule, Rfl: 3  •  levocetirizine (Xyzal) 5 MG tablet, Take 1 tablet by mouth Every Evening., Disp: 90 tablet, Rfl: 3  •  montelukast (Singulair) 10 MG tablet, Take 1 tablet by mouth Every Night., Disp: 90 tablet, Rfl: 3  •  omeprazole (priLOSEC) 40 MG capsule, Take 40 mg by mouth 2 (Two) Times a Day., Disp: , Rfl:   •  vitamin D (ERGOCALCIFEROL) 1.25 MG (38520 UT) capsule capsule, TAKE 1 CAPSULE BY MOUTH  ONCE WEEKLY, Disp: 12 capsule, Rfl: 0  •  albuterol sulfate  (90 Base) MCG/ACT inhaler, Inhale 2 puffs Every 4 (Four) Hours As Needed for Wheezing., Disp: 18 g, Rfl: 5  •  amoxicillin (AMOXIL) 875 MG tablet, Take 1 tablet by mouth 2 (Two) Times a Day., Disp: 20 tablet, Rfl: 0  •  fluconazole (DIFLUCAN) 150 MG tablet, TAKE ONE TABLET BY MOUTH IN A SINGLE DOSE, Disp: 1 tablet, Rfl: 0  •  phentermine (Adipex-P) 37.5 MG tablet, Take 1 tablet by " mouth Every Morning Before Breakfast., Disp: 30 tablet, Rfl: 0    Current Facility-Administered Medications:   •  triamcinolone acetonide (KENALOG-40) injection 40 mg, 40 mg, Intramuscular, Once, Carolyne Dixon DO      Allergies: Patient has no known allergies.    Physical Exam  Constitutional:       Appearance: Normal appearance.   HENT:      Right Ear: Tympanic membrane and ear canal normal.      Left Ear: Tympanic membrane and ear canal normal.      Nose: Nose normal.      Mouth/Throat:      Pharynx: No posterior oropharyngeal erythema.   Eyes:      Extraocular Movements: Extraocular movements intact.      Conjunctiva/sclera: Conjunctivae normal.      Pupils: Pupils are equal, round, and reactive to light.   Cardiovascular:      Rate and Rhythm: Normal rate and regular rhythm.      Heart sounds: Normal heart sounds.   Pulmonary:      Effort: Pulmonary effort is normal. No accessory muscle usage.      Breath sounds: Normal breath sounds.   Lymphadenopathy:      Cervical: No cervical adenopathy.   Skin:     General: Skin is warm and dry.   Neurological:      General: No focal deficit present.      Mental Status: She is alert.   Psychiatric:         Mood and Affect: Mood normal.         Behavior: Behavior normal.         Thought Content: Thought content normal.         Judgment: Judgment normal.          Result Review :                   Assessment and Plan    Diagnoses and all orders for this visit:    1. Otalgia of both ears (Primary)  Comments:  We may refer to ENT if not improving. Try Hydrogen peroxide in ears as needed.     2. Pain, dental  Comments:  Finish Amox. Follow up with dentist. RTC if not improving in 1 week.   Orders:  -     amoxicillin (AMOXIL) 875 MG tablet; Take 1 tablet by mouth 2 (Two) Times a Day.  Dispense: 20 tablet; Refill: 0                Follow Up   Return in about 1 week (around 11/21/2022) for if not improving or sooner if symptoms worsen.  Patient was given instructions and  counseling regarding her condition or for health maintenance advice. Please see specific information pulled into the AVS if appropriate.     SHAVON Mcclain

## 2023-01-13 DIAGNOSIS — K21.9 GASTROESOPHAGEAL REFLUX DISEASE WITHOUT ESOPHAGITIS: ICD-10-CM

## 2023-01-13 RX ORDER — OMEPRAZOLE 40 MG/1
40 CAPSULE, DELAYED RELEASE ORAL 2 TIMES DAILY
Qty: 90 CAPSULE | Refills: 1 | Status: SHIPPED | OUTPATIENT
Start: 2023-01-13 | End: 2023-01-19 | Stop reason: SDUPTHER

## 2023-01-16 ENCOUNTER — TELEPHONE (OUTPATIENT)
Dept: FAMILY MEDICINE CLINIC | Facility: CLINIC | Age: 48
End: 2023-01-16
Payer: COMMERCIAL

## 2023-01-19 ENCOUNTER — OFFICE VISIT (OUTPATIENT)
Dept: FAMILY MEDICINE CLINIC | Facility: CLINIC | Age: 48
End: 2023-01-19
Payer: COMMERCIAL

## 2023-01-19 VITALS
HEART RATE: 83 BPM | SYSTOLIC BLOOD PRESSURE: 122 MMHG | TEMPERATURE: 98.4 F | OXYGEN SATURATION: 97 % | DIASTOLIC BLOOD PRESSURE: 84 MMHG

## 2023-01-19 DIAGNOSIS — J40 BRONCHITIS: Primary | ICD-10-CM

## 2023-01-19 DIAGNOSIS — K21.9 GASTROESOPHAGEAL REFLUX DISEASE WITHOUT ESOPHAGITIS: ICD-10-CM

## 2023-01-19 DIAGNOSIS — J44.9 CHRONIC OBSTRUCTIVE PULMONARY DISEASE, UNSPECIFIED COPD TYPE: ICD-10-CM

## 2023-01-19 PROCEDURE — 99214 OFFICE O/P EST MOD 30 MIN: CPT | Performed by: NURSE PRACTITIONER

## 2023-01-19 RX ORDER — AZITHROMYCIN 250 MG/1
TABLET, FILM COATED ORAL
Qty: 6 TABLET | Refills: 0 | Status: SHIPPED | OUTPATIENT
Start: 2023-01-19 | End: 2023-01-24

## 2023-01-19 RX ORDER — ALBUTEROL SULFATE 90 UG/1
2 AEROSOL, METERED RESPIRATORY (INHALATION) EVERY 4 HOURS PRN
Qty: 18 G | Refills: 5 | Status: SHIPPED | OUTPATIENT
Start: 2023-01-19

## 2023-01-19 RX ORDER — BUDESONIDE AND FORMOTEROL FUMARATE DIHYDRATE 80; 4.5 UG/1; UG/1
2 AEROSOL RESPIRATORY (INHALATION)
Qty: 10.2 G | Refills: 12 | Status: SHIPPED | OUTPATIENT
Start: 2023-01-19

## 2023-01-19 RX ORDER — OMEPRAZOLE 40 MG/1
40 CAPSULE, DELAYED RELEASE ORAL 2 TIMES DAILY
Qty: 90 CAPSULE | Refills: 1 | Status: SHIPPED | OUTPATIENT
Start: 2023-01-19

## 2023-01-19 RX ORDER — DEXTROMETHORPHAN HYDROBROMIDE AND PROMETHAZINE HYDROCHLORIDE 15; 6.25 MG/5ML; MG/5ML
5 SOLUTION ORAL 4 TIMES DAILY PRN
Qty: 200 ML | Refills: 0 | Status: SHIPPED | OUTPATIENT
Start: 2023-01-19 | End: 2023-02-21

## 2023-01-19 RX ORDER — PREDNISONE 20 MG/1
TABLET ORAL
Qty: 18 TABLET | Refills: 0 | Status: SHIPPED | OUTPATIENT
Start: 2023-01-19 | End: 2023-02-21

## 2023-01-19 NOTE — PROGRESS NOTES
Chief Complaint  Cough    Subjective          Ashlee Marte presents to [facility]  History of Present Illness  Pt has had cough and congestion x 4 days. She denies fever, chills, or myalgias. She feels tightness in her chest at times with coughing.       Objective   Vital Signs:   /84   Pulse 83   Temp 98.4 °F (36.9 °C)   SpO2 97%     There is no height or weight on file to calculate BMI.    Review of Systems   Constitutional: Negative for fatigue and fever.   HENT: Positive for congestion.    Respiratory: Positive for cough, shortness of breath and wheezing.    Cardiovascular: Negative for chest pain, palpitations and leg swelling.   Neurological: Negative for syncope.   Psychiatric/Behavioral: The patient is not nervous/anxious.           Current Outpatient Medications:   •  albuterol sulfate  (90 Base) MCG/ACT inhaler, Inhale 2 puffs Every 4 (Four) Hours As Needed for Wheezing., Disp: 18 g, Rfl: 5  •  budesonide-formoterol (Symbicort) 80-4.5 MCG/ACT inhaler, Inhale 2 puffs 2 (Two) Times a Day., Disp: 10.2 g, Rfl: 12  •  FLUoxetine (PROzac) 40 MG capsule, Take 1 capsule by mouth Daily., Disp: 90 capsule, Rfl: 3  •  omeprazole (priLOSEC) 40 MG capsule, Take 1 capsule by mouth 2 (Two) Times a Day., Disp: 90 capsule, Rfl: 1  •  azithromycin (Zithromax Z-Kyle) 250 MG tablet, Take 2 tablets by mouth Daily for 1 day, THEN 1 tablet Daily for 4 days., Disp: 6 tablet, Rfl: 0  •  levocetirizine (Xyzal) 5 MG tablet, Take 1 tablet by mouth Every Evening., Disp: 90 tablet, Rfl: 3  •  montelukast (Singulair) 10 MG tablet, Take 1 tablet by mouth Every Night., Disp: 90 tablet, Rfl: 3  •  phentermine (Adipex-P) 37.5 MG tablet, Take 1 tablet by mouth Every Morning Before Breakfast., Disp: 30 tablet, Rfl: 0  •  predniSONE (DELTASONE) 20 MG tablet, 3 QD x 3 days, 2 QD x 3 days, 1 QD x 3 days, Disp: 18 tablet, Rfl: 0  •  promethazine-dextromethorphan (PROMETHAZINE-DM) 6.25-15 MG/5ML  solution, Take 5 mL by mouth 4 (Four) Times a Day As Needed for Cough., Disp: 200 mL, Rfl: 0  •  vitamin D (ERGOCALCIFEROL) 1.25 MG (50148 UT) capsule capsule, TAKE 1 CAPSULE BY MOUTH  ONCE WEEKLY, Disp: 12 capsule, Rfl: 0  No current facility-administered medications for this visit.      Allergies: Patient has no known allergies.    Physical Exam  Constitutional:       Appearance: Normal appearance.   HENT:      Head: Normocephalic.   Eyes:      Conjunctiva/sclera: Conjunctivae normal.      Pupils: Pupils are equal, round, and reactive to light.   Cardiovascular:      Rate and Rhythm: Normal rate and regular rhythm.      Heart sounds: Normal heart sounds.   Pulmonary:      Effort: Pulmonary effort is normal.      Breath sounds: Normal breath sounds.   Abdominal:      Tenderness: There is no abdominal tenderness.   Musculoskeletal:         General: Normal range of motion.   Skin:     General: Skin is warm and dry.      Capillary Refill: Capillary refill takes less than 2 seconds.   Neurological:      General: No focal deficit present.      Mental Status: She is alert and oriented to person, place, and time.   Psychiatric:         Mood and Affect: Mood normal.         Behavior: Behavior normal.         Thought Content: Thought content normal.         Judgment: Judgment normal.          Result Review :                   Assessment and Plan    Diagnoses and all orders for this visit:    1. Bronchitis (Primary)  Comments:  Finish Z-max and prednisone. Mucinex and Phen DM for cough. RTC for worsened sx and if not improving in 1 week.   Orders:  -     predniSONE (DELTASONE) 20 MG tablet; 3 QD x 3 days, 2 QD x 3 days, 1 QD x 3 days  Dispense: 18 tablet; Refill: 0  -     promethazine-dextromethorphan (PROMETHAZINE-DM) 6.25-15 MG/5ML solution; Take 5 mL by mouth 4 (Four) Times a Day As Needed for Cough.  Dispense: 200 mL; Refill: 0  -     azithromycin (Zithromax Z-Kyle) 250 MG tablet; Take 2 tablets by mouth Daily for 1 day,  THEN 1 tablet Daily for 4 days.  Dispense: 6 tablet; Refill: 0    2. Gastroesophageal reflux disease without esophagitis  Comments:  Continue current medications.  Orders:  -     omeprazole (priLOSEC) 40 MG capsule; Take 1 capsule by mouth 2 (Two) Times a Day.  Dispense: 90 capsule; Refill: 1    3. Chronic obstructive pulmonary disease, unspecified COPD type (HCC)  Comments:  Restart inhalers.  Albuterol as needed for wheezing.    Orders:  -     budesonide-formoterol (Symbicort) 80-4.5 MCG/ACT inhaler; Inhale 2 puffs 2 (Two) Times a Day.  Dispense: 10.2 g; Refill: 12  -     albuterol sulfate  (90 Base) MCG/ACT inhaler; Inhale 2 puffs Every 4 (Four) Hours As Needed for Wheezing.  Dispense: 18 g; Refill: 5                Follow Up   Return in about 1 week (around 1/26/2023) for if not improving or sooner if symptoms worsen.  Patient was given instructions and counseling regarding her condition or for health maintenance advice. Please see specific information pulled into the AVS if appropriate.     SHAVON Mcclain

## 2023-02-21 ENCOUNTER — OFFICE VISIT (OUTPATIENT)
Dept: FAMILY MEDICINE CLINIC | Facility: CLINIC | Age: 48
End: 2023-02-21
Payer: COMMERCIAL

## 2023-02-21 VITALS
HEART RATE: 90 BPM | SYSTOLIC BLOOD PRESSURE: 154 MMHG | HEIGHT: 64 IN | BODY MASS INDEX: 34.5 KG/M2 | OXYGEN SATURATION: 97 % | DIASTOLIC BLOOD PRESSURE: 94 MMHG

## 2023-02-21 DIAGNOSIS — M62.838 MUSCLE SPASM: ICD-10-CM

## 2023-02-21 DIAGNOSIS — M54.9 DORSALGIA: Primary | ICD-10-CM

## 2023-02-21 PROCEDURE — 99213 OFFICE O/P EST LOW 20 MIN: CPT | Performed by: INTERNAL MEDICINE

## 2023-02-21 RX ORDER — HYDROCODONE BITARTRATE AND ACETAMINOPHEN 5; 325 MG/1; MG/1
1 TABLET ORAL EVERY 8 HOURS PRN
Qty: 9 TABLET | Refills: 0 | Status: SHIPPED | OUTPATIENT
Start: 2023-02-21

## 2023-02-21 RX ORDER — CYCLOBENZAPRINE HCL 5 MG
5 TABLET ORAL NIGHTLY PRN
Qty: 10 TABLET | Refills: 0 | Status: SHIPPED | OUTPATIENT
Start: 2023-02-21

## 2023-02-21 NOTE — PROGRESS NOTES
Office Note     Name: Ashlee Marte    : 1975     MRN: [id number]     Chief Complaint  Back Pain (Pt complains of low back pain onset Friday AM. )    Subjective     History of Present Illness:  Ashlee Marte is a 47 y.o. female who presents today for low back pain. Started 4 days ago while getting dressed and putting pants on, felt sudden onset midline low back pain. No recent fall, trauma or injury. No recent change in physical activity/exercise regimen.No weakness and no numbness or tingling.  OTC meds not helping, heating pad not helping.  Pain is gradually getting worse. Denies chronic use of opiates or pain meds but has taken hydrocodone and flexeril in the past for similar symptoms, only side effect is drwosiness    Review of Systems:   Review of Systems    Past Medical History:   Past Medical History:   Diagnosis Date   • Asthma    • COPD (chronic obstructive pulmonary disease) (HCC)    • Depression     MEDS       Past Surgical History:   Past Surgical History:   Procedure Laterality Date   • GASTRECTOMY      SLEEVE       Family History:   Family History   Problem Relation Age of Onset   • Diabetes Other    • COPD Other        Social History:   Social History     Socioeconomic History   • Marital status: Unknown   Tobacco Use   • Smoking status: Every Day     Packs/day: 1.00     Years: 32.00     Pack years: 32.00     Types: Cigarettes     Start date:    • Smokeless tobacco: Never   Vaping Use   • Vaping Use: Never used   Substance and Sexual Activity   • Alcohol use: Yes   • Drug use: Not Currently   • Sexual activity: Yes     Partners: Male       Immunizations:   Immunization History   Administered Date(s) Administered   • COVID-19 (MODERNA) 1st, 2nd, 3rd Dose Only 2021, 2021   • COVID-19 (MODERNA) BOOSTER 2021   • FluLaval/Fluzone >6mos 10/07/2022   • Fluzone Quad >6mos (Multi-dose) 10/19/2021   • Hepatitis A 2018, 2019   • Hepatitis B 2021, 2022   •  "Influenza, Unspecified 10/19/2021   • MMR 12/16/2021, 02/07/2022   • Tdap 08/21/2008        Medications:     Current Outpatient Medications:   •  albuterol sulfate  (90 Base) MCG/ACT inhaler, Inhale 2 puffs Every 4 (Four) Hours As Needed for Wheezing., Disp: 18 g, Rfl: 5  •  budesonide-formoterol (Symbicort) 80-4.5 MCG/ACT inhaler, Inhale 2 puffs 2 (Two) Times a Day., Disp: 10.2 g, Rfl: 12  •  FLUoxetine (PROzac) 40 MG capsule, Take 1 capsule by mouth Daily., Disp: 90 capsule, Rfl: 3  •  montelukast (Singulair) 10 MG tablet, Take 1 tablet by mouth Every Night., Disp: 90 tablet, Rfl: 3  •  omeprazole (priLOSEC) 40 MG capsule, Take 1 capsule by mouth 2 (Two) Times a Day., Disp: 90 capsule, Rfl: 1        Allergies:   No Known Allergies    Objective     Vital Signs  /94   Pulse 90   Ht 162.6 cm (64\")   SpO2 97%   BMI 34.50 kg/m²   Estimated body mass index is 34.5 kg/m² as calculated from the following:    Height as of this encounter: 162.6 cm (64\").    Weight as of 7/19/22: 91.2 kg (201 lb).          Physical Exam  Vitals and nursing note reviewed.   Constitutional:       General: She is not in acute distress (uncomfortable).  Musculoskeletal:      Cervical back: No spasms, tenderness or bony tenderness.      Thoracic back: No spasms, tenderness or bony tenderness.      Lumbar back: Spasms (moderate to severe), tenderness (bilateral paraspinous muscle tenderness) and bony tenderness present.      Comments: 5 / 5 strength bilateral lower extremities upon flexion and extension at hips knees and ankles.            Procedures     Assessment and Plan     1. Dorsalgia  Advised patient that prescription pain medication especially combined with muscle relaxer can be sedating and cause drowsiness. Recommend caution while driving and operating machinery. Can also be habit forming, therefore I recommend to use very sparingly in the smallest dose and as infrequently as possible for breakthrough pain not controlled " by other modalities.     Pain medications typically are prescribed only on a short term basis. If patient requires pain medication for more than 3-5 days,will need re-evaluation ad imagine.      JUAN reviewed by Shelly Gongora MD compliant with stated meds, was briefly on phentermine but no longer takes it. No recent opiates on PDMP    START  - HYDROcodone-acetaminophen (NORCO) 5-325 MG per tablet; Take 1 tablet by mouth Every 8 (Eight) Hours As Needed for Moderate Pain.  Dispense: 9 tablet; Refill: 0    2. Muscle spasm  START  - cyclobenzaprine (FLEXERIL) 5 MG tablet; Take 1 tablet by mouth At Night As Needed for Muscle Spasms.  Dispense: 10 tablet; Refill: 0       Follow Up  Return if symptoms worsen or fail to improve in 5-7 days.    Shelly Gongora MD  [facility]  [address] [place] [id number] [phone]

## 2023-05-18 ENCOUNTER — TELEMEDICINE (OUTPATIENT)
Dept: FAMILY MEDICINE CLINIC | Facility: CLINIC | Age: 48
End: 2023-05-18
Payer: COMMERCIAL

## 2023-05-18 DIAGNOSIS — J40 BRONCHITIS: Primary | ICD-10-CM

## 2023-05-18 PROCEDURE — 1160F RVW MEDS BY RX/DR IN RCRD: CPT | Performed by: NURSE PRACTITIONER

## 2023-05-18 PROCEDURE — 99214 OFFICE O/P EST MOD 30 MIN: CPT | Performed by: NURSE PRACTITIONER

## 2023-05-18 PROCEDURE — 1159F MED LIST DOCD IN RCRD: CPT | Performed by: NURSE PRACTITIONER

## 2023-05-18 RX ORDER — DEXTROMETHORPHAN HYDROBROMIDE AND PROMETHAZINE HYDROCHLORIDE 15; 6.25 MG/5ML; MG/5ML
5 SOLUTION ORAL 4 TIMES DAILY PRN
Qty: 200 ML | Refills: 0 | Status: SHIPPED | OUTPATIENT
Start: 2023-05-18

## 2023-05-18 RX ORDER — CEFUROXIME AXETIL 500 MG/1
500 TABLET ORAL 2 TIMES DAILY
Qty: 20 TABLET | Refills: 0 | Status: SHIPPED | OUTPATIENT
Start: 2023-05-18

## 2023-05-18 NOTE — LETTER
May 18, 2023    [facility]  [address] [id number]      Please excuse from work for 3 days. Thank you.           Wen Todd, APRN

## 2023-05-18 NOTE — PROGRESS NOTES
Chief Complaint  Cough  This was a video and audio enabled telemedicine encounter. The patient was in home. The provider was in office.     Myrna Marte presents to [facility]  History of Present Illness  Pt has had cough and congestion x 5 days. She has a productive cough at times and wheezing at times. She denies shortness of breath currently.   Cough  Pertinent negatives include no chest pain, fever, shortness of breath or wheezing.       Objective   Vital Signs:   There were no vitals taken for this visit.    There is no height or weight on file to calculate BMI.    Review of Systems   Constitutional: Negative for fatigue and fever.   HENT: Positive for congestion.    Respiratory: Positive for cough. Negative for shortness of breath and wheezing.    Cardiovascular: Negative for chest pain, palpitations and leg swelling.   Neurological: Negative for syncope.   Psychiatric/Behavioral: The patient is not nervous/anxious.           Current Outpatient Medications:   •  albuterol sulfate  (90 Base) MCG/ACT inhaler, Inhale 2 puffs Every 4 (Four) Hours As Needed for Wheezing., Disp: 18 g, Rfl: 5  •  budesonide-formoterol (Symbicort) 80-4.5 MCG/ACT inhaler, Inhale 2 puffs 2 (Two) Times a Day., Disp: 10.2 g, Rfl: 12  •  FLUoxetine (PROzac) 40 MG capsule, Take 1 capsule by mouth Daily., Disp: 90 capsule, Rfl: 3  •  montelukast (Singulair) 10 MG tablet, Take 1 tablet by mouth Every Night., Disp: 90 tablet, Rfl: 3  •  omeprazole (priLOSEC) 40 MG capsule, Take 1 capsule by mouth 2 (Two) Times a Day., Disp: 90 capsule, Rfl: 1  •  cefuroxime (CEFTIN) 500 MG tablet, Take 1 tablet by mouth 2 (Two) Times a Day., Disp: 20 tablet, Rfl: 0  •  promethazine-dextromethorphan (PROMETHAZINE-DM) 6.25-15 MG/5ML solution, Take 5 mL by mouth 4 (Four) Times a Day As Needed for Cough., Disp: 200 mL, Rfl: 0      Allergies: Patient has no known allergies.    Physical Exam  Constitutional:        Appearance: Normal appearance.   Neurological:      Mental Status: She is alert.   Psychiatric:         Mood and Affect: Mood normal.         Behavior: Behavior normal.         Thought Content: Thought content normal.         Judgment: Judgment normal.          Result Review :                   Assessment and Plan    Diagnoses and all orders for this visit:    1. Bronchitis (Primary)  Comments:  Finish antibiotics and Mucinex/Phen DM for cough. Increase fluids. Albuterol as needed for wheezing. RTC if not improving in 1 week.   Orders:  -     promethazine-dextromethorphan (PROMETHAZINE-DM) 6.25-15 MG/5ML solution; Take 5 mL by mouth 4 (Four) Times a Day As Needed for Cough.  Dispense: 200 mL; Refill: 0  -     cefuroxime (CEFTIN) 500 MG tablet; Take 1 tablet by mouth 2 (Two) Times a Day.  Dispense: 20 tablet; Refill: 0                Follow Up   Return in about 1 week (around 5/25/2023) for if not improving or sooner if symptoms worsen.  Patient was given instructions and counseling regarding her condition or for health maintenance advice. Please see specific information pulled into the AVS if appropriate.     SHAVON Mcclain

## 2023-06-06 ENCOUNTER — TELEPHONE (OUTPATIENT)
Dept: FAMILY MEDICINE CLINIC | Facility: CLINIC | Age: 48
End: 2023-06-06
Payer: COMMERCIAL

## 2023-06-06 DIAGNOSIS — J44.9 CHRONIC OBSTRUCTIVE PULMONARY DISEASE, UNSPECIFIED COPD TYPE: ICD-10-CM

## 2023-06-06 RX ORDER — BUDESONIDE AND FORMOTEROL FUMARATE DIHYDRATE 80; 4.5 UG/1; UG/1
2 AEROSOL RESPIRATORY (INHALATION)
Qty: 10.2 G | Refills: 12 | Status: SHIPPED | OUTPATIENT
Start: 2023-06-06

## 2023-06-06 NOTE — TELEPHONE ENCOUNTER
Contacted the pharmacy- pt has 4 refills left on the albuterol inhaler. Synthroid is not on her med list at this time.

## 2023-10-06 ENCOUNTER — FLU SHOT (OUTPATIENT)
Dept: FAMILY MEDICINE CLINIC | Facility: CLINIC | Age: 48
End: 2023-10-06
Payer: COMMERCIAL

## 2023-10-06 DIAGNOSIS — Z23 FLU VACCINE NEED: Primary | ICD-10-CM

## 2023-10-30 ENCOUNTER — OFFICE VISIT (OUTPATIENT)
Dept: FAMILY MEDICINE CLINIC | Facility: CLINIC | Age: 48
End: 2023-10-30
Payer: COMMERCIAL

## 2023-10-30 DIAGNOSIS — R30.0 DYSURIA: Primary | ICD-10-CM

## 2023-10-30 LAB
BILIRUB BLD-MCNC: ABNORMAL MG/DL
CLARITY, POC: CLEAR
COLOR UR: YELLOW
EXPIRATION DATE: ABNORMAL
GLUCOSE UR STRIP-MCNC: NEGATIVE MG/DL
KETONES UR QL: ABNORMAL
LEUKOCYTE EST, POC: NEGATIVE
Lab: ABNORMAL
NITRITE UR-MCNC: NEGATIVE MG/ML
PH UR: 5.5 [PH] (ref 5–8)
PROT UR STRIP-MCNC: ABNORMAL MG/DL
RBC # UR STRIP: NEGATIVE /UL
SP GR UR: 1.03 (ref 1–1.03)
UROBILINOGEN UR QL: NORMAL

## 2023-10-30 RX ORDER — SULFAMETHOXAZOLE AND TRIMETHOPRIM 800; 160 MG/1; MG/1
1 TABLET ORAL 2 TIMES DAILY
Qty: 14 TABLET | Refills: 0 | Status: SHIPPED | OUTPATIENT
Start: 2023-10-30

## 2023-10-30 NOTE — PROGRESS NOTES
Chief Complaint  Urinary Tract Infection    Subjective          Ashlee Marte presents to [facility]  History of Present Illness  Pt has had burning with urination, frequency, and urgency. She denies fever, chills, or myalgias.       Objective   Vital Signs:   There were no vitals taken for this visit.    There is no height or weight on file to calculate BMI.    Review of Systems   Constitutional:  Negative for fatigue and fever.   Respiratory:  Negative for shortness of breath.    Cardiovascular:  Negative for chest pain, palpitations and leg swelling.   Genitourinary:  Positive for dysuria.   Neurological:  Negative for syncope.   Psychiatric/Behavioral:  The patient is not nervous/anxious.           Current Outpatient Medications:     albuterol sulfate  (90 Base) MCG/ACT inhaler, Inhale 2 puffs Every 4 (Four) Hours As Needed for Wheezing., Disp: 18 g, Rfl: 5    budesonide-formoterol (Symbicort) 80-4.5 MCG/ACT inhaler, Inhale 2 puffs 2 (Two) Times a Day., Disp: 10.2 g, Rfl: 12    FLUoxetine (PROzac) 40 MG capsule, Take 1 capsule by mouth Daily., Disp: 90 capsule, Rfl: 3    montelukast (Singulair) 10 MG tablet, Take 1 tablet by mouth Every Night., Disp: 90 tablet, Rfl: 3    omeprazole (priLOSEC) 40 MG capsule, Take 1 capsule by mouth 2 (Two) Times a Day., Disp: 180 capsule, Rfl: 3    promethazine-dextromethorphan (PROMETHAZINE-DM) 6.25-15 MG/5ML solution, Take 5 mL by mouth 4 (Four) Times a Day As Needed for Cough., Disp: 200 mL, Rfl: 0    sulfamethoxazole-trimethoprim (Bactrim DS) 800-160 MG per tablet, Take 1 tablet by mouth 2 (Two) Times a Day., Disp: 14 tablet, Rfl: 0      Allergies: Patient has no known allergies.    Physical Exam  Constitutional:       Appearance: Normal appearance.   HENT:      Head: Normocephalic.   Eyes:      Conjunctiva/sclera: Conjunctivae normal.      Pupils: Pupils are equal, round, and reactive to light.   Cardiovascular:      Rate and Rhythm:  Normal rate and regular rhythm.      Heart sounds: Normal heart sounds.   Pulmonary:      Effort: Pulmonary effort is normal.      Breath sounds: Normal breath sounds.   Abdominal:      Tenderness: There is no abdominal tenderness.   Musculoskeletal:         General: Normal range of motion.   Skin:     General: Skin is warm and dry.      Capillary Refill: Capillary refill takes less than 2 seconds.   Neurological:      General: No focal deficit present.      Mental Status: She is alert and oriented to person, place, and time.   Psychiatric:         Mood and Affect: Mood normal.         Behavior: Behavior normal.         Thought Content: Thought content normal.         Judgment: Judgment normal.          Result Review :                   Assessment and Plan    Diagnoses and all orders for this visit:    1. Dysuria (Primary)  Comments:  UA and cx done. Finish antibiotics. Increase fluids and void often. RTC if not improving in 1 week.  Orders:  -     sulfamethoxazole-trimethoprim (Bactrim DS) 800-160 MG per tablet; Take 1 tablet by mouth 2 (Two) Times a Day.  Dispense: 14 tablet; Refill: 0  -     POCT urinalysis dipstick, automated  -     Urine Culture - Urine, Urine, Clean Catch                Follow Up   Return in about 1 week (around 11/6/2023) for if not improving or sooner if symptoms worsen.  Patient was given instructions and counseling regarding her condition or for health maintenance advice. Please see specific information pulled into the AVS if appropriate.     SHAVON Mcclain

## 2023-11-01 LAB
BACTERIA UR CULT: NORMAL
BACTERIA UR CULT: NORMAL

## 2023-11-13 ENCOUNTER — CLINICAL SUPPORT (OUTPATIENT)
Dept: FAMILY MEDICINE CLINIC | Facility: CLINIC | Age: 48
End: 2023-11-13
Payer: COMMERCIAL

## 2023-11-13 DIAGNOSIS — Z23 IMMUNIZATION DUE: Primary | ICD-10-CM

## 2023-12-05 RX ORDER — FLUOXETINE HYDROCHLORIDE 40 MG/1
40 CAPSULE ORAL DAILY
Qty: 90 CAPSULE | Refills: 3 | Status: SHIPPED | OUTPATIENT
Start: 2023-12-05

## 2023-12-14 ENCOUNTER — OFFICE VISIT (OUTPATIENT)
Dept: FAMILY MEDICINE CLINIC | Facility: CLINIC | Age: 48
End: 2023-12-14
Payer: COMMERCIAL

## 2023-12-14 VITALS
SYSTOLIC BLOOD PRESSURE: 112 MMHG | WEIGHT: 211 LBS | HEART RATE: 110 BPM | DIASTOLIC BLOOD PRESSURE: 80 MMHG | HEIGHT: 64 IN | BODY MASS INDEX: 36.02 KG/M2 | OXYGEN SATURATION: 96 %

## 2023-12-14 DIAGNOSIS — Z00.00 ROUTINE MEDICAL EXAM: Primary | ICD-10-CM

## 2023-12-14 DIAGNOSIS — E55.9 VITAMIN D DEFICIENCY: ICD-10-CM

## 2023-12-14 DIAGNOSIS — Z12.11 SCREENING FOR COLON CANCER: ICD-10-CM

## 2023-12-14 DIAGNOSIS — Z13.1 SCREENING FOR DIABETES MELLITUS: ICD-10-CM

## 2023-12-14 DIAGNOSIS — Z12.31 ENCOUNTER FOR SCREENING MAMMOGRAM FOR MALIGNANT NEOPLASM OF BREAST: ICD-10-CM

## 2023-12-14 DIAGNOSIS — J44.9 CHRONIC OBSTRUCTIVE PULMONARY DISEASE, UNSPECIFIED COPD TYPE: ICD-10-CM

## 2023-12-14 DIAGNOSIS — Z13.220 SCREENING FOR LIPID DISORDERS: ICD-10-CM

## 2023-12-14 DIAGNOSIS — E56.9 VITAMIN DEFICIENCY: ICD-10-CM

## 2023-12-14 RX ORDER — ALBUTEROL SULFATE 90 UG/1
2 AEROSOL, METERED RESPIRATORY (INHALATION) EVERY 4 HOURS PRN
Qty: 18 G | Refills: 11 | Status: SHIPPED | OUTPATIENT
Start: 2023-12-14

## 2023-12-14 NOTE — PROGRESS NOTES
"Chief Complaint  Annual Exam    Subjective          Ashlee Marte presents to [facility] for preventative yearly exam.   History of Present Illness  Pt is here for a physical exam. She is doing well with no complaints.       Objective   Vital Signs:   /80   Pulse 110   Ht 162.6 cm (64\")   Wt 95.7 kg (211 lb)   SpO2 96%   BMI 36.22 kg/m²     Body mass index is 36.22 kg/m².    Predictive Model Details   No score data available for Risk of Fall        PHQ-9 Depression Screening  Little interest or pleasure in doing things? 0-->not at all   Feeling down, depressed, or hopeless? 0-->not at all   Trouble falling or staying asleep, or sleeping too much?     Feeling tired or having little energy?     Poor appetite or overeating?     Feeling bad about yourself - or that you are a failure or have let yourself or your family down?     Trouble concentrating on things, such as reading the newspaper or watching television?     Moving or speaking so slowly that other people could have noticed? Or the opposite - being so fidgety or restless that you have been moving around a lot more than usual?     Thoughts that you would be better off dead, or of hurting yourself in some way?     PHQ-9 Total Score 0   If you checked off any problems, how difficult have these problems made it for you to do your work, take care of things at home, or get along with other people?       Health Maintenance   Topic Date Due    BMI FOLLOWUP  Never done    COLORECTAL CANCER SCREENING  Never done    Pneumococcal Vaccine 0-64 (1 - PCV) Never done    HEPATITIS C SCREENING  Never done    PAP SMEAR  Never done    MAMMOGRAM  06/03/2023    ZOSTER VACCINE (1 of 2) 12/14/2023 (Originally 8/9/2023)    COVID-19 Vaccine (4 - 2023-24 season) 03/04/2024 (Originally 9/1/2023)    ANNUAL PHYSICAL  12/14/2024    TDAP/TD VACCINES (3 - Td or Tdap) 11/13/2033    INFLUENZA VACCINE  Completed        Immunization History   Administered " Date(s) Administered    COVID-19 (MODERNA) 1st,2nd,3rd Dose Monovalent 01/06/2021, 02/06/2021    COVID-19 (MODERNA) Monovalent Original Booster 11/22/2021    Fluzone (or Fluarix & Flulaval for VFC) >6mos 10/07/2022, 10/06/2023    Fluzone Quad >6mos (Multi-dose) 10/19/2021    Hepatitis A 08/30/2018, 04/04/2019    Hepatitis B Adult/Adolescent IM 12/14/2021, 01/14/2022    Influenza, Unspecified 10/19/2021    MMR 12/16/2021, 02/07/2022    Tdap 08/21/2008, 11/13/2023       Review of Systems   Constitutional:  Negative for fatigue and fever.   Respiratory:  Negative for shortness of breath.    Cardiovascular:  Negative for chest pain, palpitations and leg swelling.   Neurological:  Negative for syncope.   Psychiatric/Behavioral:  The patient is not nervous/anxious.         Past History:  Medical History: has a past medical history of Asthma, COPD (chronic obstructive pulmonary disease), and Depression.   Surgical History: has a past surgical history that includes Gastrectomy.   Family History: family history includes COPD in an other family member; Diabetes in an other family member.   Social History: reports that she has been smoking cigarettes. She started smoking about 33 years ago. She has a 16.00 pack-year smoking history. She has never used smokeless tobacco. She reports current alcohol use. She reports that she does not currently use drugs.       Allergies: Patient has no known allergies.    Physical Exam  Constitutional:       Appearance: Normal appearance.   HENT:      Head: Normocephalic.   Eyes:      Conjunctiva/sclera: Conjunctivae normal.      Pupils: Pupils are equal, round, and reactive to light.   Cardiovascular:      Rate and Rhythm: Normal rate and regular rhythm.      Heart sounds: Normal heart sounds.   Pulmonary:      Effort: Pulmonary effort is normal.      Breath sounds: Normal breath sounds.   Abdominal:      Tenderness: There is no abdominal tenderness.   Musculoskeletal:         General: Normal  range of motion.   Skin:     General: Skin is warm and dry.      Capillary Refill: Capillary refill takes less than 2 seconds.   Neurological:      General: No focal deficit present.      Mental Status: She is alert and oriented to person, place, and time.   Psychiatric:         Mood and Affect: Mood normal.         Behavior: Behavior normal.         Thought Content: Thought content normal.         Judgment: Judgment normal.          Result Review :                   Assessment and Plan    Diagnoses and all orders for this visit:    1. Routine medical exam (Primary)  Comments:  We discussed diet, exercise, and prev counseling. Labs drawn. Mammo and cologuard ordered. Pt will schedule pap.  Orders:  -     CBC & Differential  -     Comprehensive Metabolic Panel  -     TSH    2. Screening for diabetes mellitus  -     Hemoglobin A1c    3. Screening for lipid disorders  -     Lipid Panel    4. Vitamin deficiency  -     Vitamin B12  -     Folate    5. Vitamin D deficiency  -     Vitamin D,25-Hydroxy    6. Chronic obstructive pulmonary disease, unspecified COPD type  Comments:  Continue inhalers.  Albuterol as needed for wheezing.  Orders:  -     albuterol sulfate  (90 Base) MCG/ACT inhaler; Inhale 2 puffs Every 4 (Four) Hours As Needed for Wheezing.  Dispense: 18 g; Refill: 11    7. Encounter for screening mammogram for malignant neoplasm of breast  -     Mammo Screening Bilateral With CAD; Future    8. Screening for colon cancer  -     Cologuard - Stool, Per Rectum; Future    Other orders  -     Pneumococcal Conjugate Vaccine 20-Valent All                Current Outpatient Medications:     albuterol sulfate  (90 Base) MCG/ACT inhaler, Inhale 2 puffs Every 4 (Four) Hours As Needed for Wheezing., Disp: 18 g, Rfl: 11    budesonide-formoterol (Symbicort) 80-4.5 MCG/ACT inhaler, Inhale 2 puffs 2 (Two) Times a Day., Disp: 10.2 g, Rfl: 12    FLUoxetine (PROzac) 40 MG capsule, TAKE ONE CAPSULE BY MOUTH DAILY, Disp:  90 capsule, Rfl: 3    omeprazole (priLOSEC) 40 MG capsule, Take 1 capsule by mouth 2 (Two) Times a Day., Disp: 180 capsule, Rfl: 3    Follow Up   Return in about 1 year (around 12/14/2024) for Annual physical.  Patient was given instructions and counseling regarding her condition or for health maintenance advice. Please see specific information pulled into the AVS if appropriate.     SHAVON Mcclain

## 2023-12-15 LAB
25(OH)D3+25(OH)D2 SERPL-MCNC: 8.5 NG/ML (ref 30–100)
ALBUMIN SERPL-MCNC: 4.1 G/DL (ref 3.9–4.9)
ALBUMIN/GLOB SERPL: 1.6 {RATIO} (ref 1.2–2.2)
ALP SERPL-CCNC: 93 IU/L (ref 44–121)
ALT SERPL-CCNC: 8 IU/L (ref 0–32)
AST SERPL-CCNC: 15 IU/L (ref 0–40)
BASOPHILS # BLD AUTO: 0 X10E3/UL (ref 0–0.2)
BASOPHILS NFR BLD AUTO: 0 %
BILIRUB SERPL-MCNC: 0.4 MG/DL (ref 0–1.2)
BUN SERPL-MCNC: 13 MG/DL (ref 6–24)
BUN/CREAT SERPL: 14 (ref 9–23)
CALCIUM SERPL-MCNC: 8.9 MG/DL (ref 8.7–10.2)
CHLORIDE SERPL-SCNC: 100 MMOL/L (ref 96–106)
CHOLEST SERPL-MCNC: 212 MG/DL (ref 100–199)
CO2 SERPL-SCNC: 25 MMOL/L (ref 20–29)
CREAT SERPL-MCNC: 0.96 MG/DL (ref 0.57–1)
EGFRCR SERPLBLD CKD-EPI 2021: 72 ML/MIN/1.73
EOSINOPHIL # BLD AUTO: 0.1 X10E3/UL (ref 0–0.4)
EOSINOPHIL NFR BLD AUTO: 1 %
ERYTHROCYTE [DISTWIDTH] IN BLOOD BY AUTOMATED COUNT: 15.8 % (ref 11.7–15.4)
FOLATE SERPL-MCNC: <2 NG/ML
GLOBULIN SER CALC-MCNC: 2.5 G/DL (ref 1.5–4.5)
GLUCOSE SERPL-MCNC: 90 MG/DL (ref 70–99)
HBA1C MFR BLD: 5.8 % (ref 4.8–5.6)
HCT VFR BLD AUTO: 38.2 % (ref 34–46.6)
HDLC SERPL-MCNC: 33 MG/DL
HGB BLD-MCNC: 12.3 G/DL (ref 11.1–15.9)
IMM GRANULOCYTES # BLD AUTO: 0 X10E3/UL (ref 0–0.1)
IMM GRANULOCYTES NFR BLD AUTO: 0 %
LDLC SERPL CALC-MCNC: 115 MG/DL (ref 0–99)
LYMPHOCYTES # BLD AUTO: 1.3 X10E3/UL (ref 0.7–3.1)
LYMPHOCYTES NFR BLD AUTO: 17 %
MCH RBC QN AUTO: 26.5 PG (ref 26.6–33)
MCHC RBC AUTO-ENTMCNC: 32.2 G/DL (ref 31.5–35.7)
MCV RBC AUTO: 82 FL (ref 79–97)
MONOCYTES # BLD AUTO: 0.5 X10E3/UL (ref 0.1–0.9)
MONOCYTES NFR BLD AUTO: 6 %
NEUTROPHILS # BLD AUTO: 5.8 X10E3/UL (ref 1.4–7)
NEUTROPHILS NFR BLD AUTO: 76 %
PLATELET # BLD AUTO: 398 X10E3/UL (ref 150–450)
POTASSIUM SERPL-SCNC: 4.3 MMOL/L (ref 3.5–5.2)
PROT SERPL-MCNC: 6.6 G/DL (ref 6–8.5)
RBC # BLD AUTO: 4.65 X10E6/UL (ref 3.77–5.28)
SODIUM SERPL-SCNC: 139 MMOL/L (ref 134–144)
TRIGL SERPL-MCNC: 364 MG/DL (ref 0–149)
TSH SERPL DL<=0.005 MIU/L-ACNC: 1.54 UIU/ML (ref 0.45–4.5)
VIT B12 SERPL-MCNC: 334 PG/ML (ref 232–1245)
VLDLC SERPL CALC-MCNC: 64 MG/DL (ref 5–40)
WBC # BLD AUTO: 7.7 X10E3/UL (ref 3.4–10.8)

## 2023-12-22 RX ORDER — ERGOCALCIFEROL 1.25 MG/1
50000 CAPSULE ORAL WEEKLY
Qty: 12 CAPSULE | Refills: 1 | Status: SHIPPED | OUTPATIENT
Start: 2023-12-22

## 2023-12-22 RX ORDER — FOLIC ACID 1 MG/1
1 TABLET ORAL DAILY
Qty: 90 TABLET | Refills: 1 | Status: SHIPPED | OUTPATIENT
Start: 2023-12-22

## 2023-12-29 RX ORDER — AZITHROMYCIN 250 MG/1
TABLET, FILM COATED ORAL
Qty: 6 TABLET | Refills: 0 | Status: SHIPPED | OUTPATIENT
Start: 2023-12-29

## 2024-01-12 DIAGNOSIS — R92.8 ABNORMAL MAMMOGRAM: Primary | ICD-10-CM

## 2024-02-01 DIAGNOSIS — R92.8 ABNORMAL MAMMOGRAM: Primary | ICD-10-CM

## 2024-02-02 ENCOUNTER — OFFICE VISIT (OUTPATIENT)
Dept: FAMILY MEDICINE CLINIC | Facility: CLINIC | Age: 49
End: 2024-02-02
Payer: COMMERCIAL

## 2024-02-02 ENCOUNTER — TELEPHONE (OUTPATIENT)
Dept: FAMILY MEDICINE CLINIC | Facility: CLINIC | Age: 49
End: 2024-02-02

## 2024-02-02 VITALS — HEART RATE: 96 BPM | SYSTOLIC BLOOD PRESSURE: 142 MMHG | OXYGEN SATURATION: 97 % | DIASTOLIC BLOOD PRESSURE: 90 MMHG

## 2024-02-02 DIAGNOSIS — R92.8 ABNORMAL MAMMOGRAM: Primary | ICD-10-CM

## 2024-02-02 PROBLEM — J44.9 CHRONIC OBSTRUCTIVE PULMONARY DISEASE: Status: ACTIVE | Noted: 2024-02-02

## 2024-02-02 PROCEDURE — 99213 OFFICE O/P EST LOW 20 MIN: CPT | Performed by: NURSE PRACTITIONER

## 2024-02-02 NOTE — PROGRESS NOTES
Chief Complaint  abnormal mammogram    Subjective          Ashlee Marte presents to [facility]  History of Present Illness  Pt had an abnormal diagnostic mammogram and a biopsy has been recommended. She was told she needed an H&P by her PCP in order to have this done. She is doing well otherwise with no acute complaints.       Objective   Vital Signs:   /90   Pulse 96   SpO2 97%     There is no height or weight on file to calculate BMI.    Review of Systems   Constitutional:  Negative for fatigue and fever.   Respiratory:  Negative for shortness of breath.    Cardiovascular:  Negative for chest pain, palpitations and leg swelling.   Neurological:  Negative for syncope.   Psychiatric/Behavioral:  The patient is not nervous/anxious.           Current Outpatient Medications:     albuterol sulfate  (90 Base) MCG/ACT inhaler, Inhale 2 puffs Every 4 (Four) Hours As Needed for Wheezing., Disp: 18 g, Rfl: 11    budesonide-formoterol (Symbicort) 80-4.5 MCG/ACT inhaler, Inhale 2 puffs 2 (Two) Times a Day., Disp: 10.2 g, Rfl: 12    FLUoxetine (PROzac) 40 MG capsule, TAKE ONE CAPSULE BY MOUTH DAILY, Disp: 90 capsule, Rfl: 3    folic acid (FOLVITE) 1 MG tablet, Take 1 tablet by mouth Daily., Disp: 90 tablet, Rfl: 1    omeprazole (priLOSEC) 40 MG capsule, Take 1 capsule by mouth 2 (Two) Times a Day., Disp: 180 capsule, Rfl: 3    vitamin D (ERGOCALCIFEROL) 1.25 MG (17500 UT) capsule capsule, Take 1 capsule by mouth 1 (One) Time Per Week., Disp: 12 capsule, Rfl: 1      Allergies: Patient has no known allergies.    Physical Exam  Constitutional:       Appearance: Normal appearance.   HENT:      Head: Normocephalic.   Eyes:      Conjunctiva/sclera: Conjunctivae normal.      Pupils: Pupils are equal, round, and reactive to light.   Cardiovascular:      Rate and Rhythm: Normal rate and regular rhythm.      Heart sounds: Normal heart sounds.   Pulmonary:      Effort: Pulmonary effort is  normal.      Breath sounds: Normal breath sounds.   Abdominal:      Tenderness: There is no abdominal tenderness.   Musculoskeletal:         General: Normal range of motion.   Skin:     General: Skin is warm and dry.      Capillary Refill: Capillary refill takes less than 2 seconds.   Neurological:      General: No focal deficit present.      Mental Status: She is alert and oriented to person, place, and time.   Psychiatric:         Mood and Affect: Mood normal.         Behavior: Behavior normal.         Thought Content: Thought content normal.         Judgment: Judgment normal.          Result Review :                   Assessment and Plan    Diagnoses and all orders for this visit:    1. Abnormal mammogram (Primary)  Comments:  Pt is cleared for breast biopsy. Return for any problems and as needed.                Follow Up   Return in about 1 week (around 2/9/2024) for Recheck.  Patient was given instructions and counseling regarding her condition or for health maintenance advice. Please see specific information pulled into the AVS if appropriate.     SHAVON Mcclain

## 2024-02-02 NOTE — TELEPHONE ENCOUNTER
Caller: ENRIQUETA WITH [facility]Y [facility].        [facility] call back number: [phone]    Who are you requesting to speak with (clinical staff, provider,  specific staff member): NURSE    What was the call regarding: DIAGNOSIS

## 2024-02-08 DIAGNOSIS — C50.912 BILATERAL MALIGNANT NEOPLASM OF BREAST IN FEMALE, UNSPECIFIED ESTROGEN RECEPTOR STATUS, UNSPECIFIED SITE OF BREAST: Primary | ICD-10-CM

## 2024-02-08 DIAGNOSIS — C50.911 BILATERAL MALIGNANT NEOPLASM OF BREAST IN FEMALE, UNSPECIFIED ESTROGEN RECEPTOR STATUS, UNSPECIFIED SITE OF BREAST: Primary | ICD-10-CM

## 2024-02-09 ENCOUNTER — TELEPHONE (OUTPATIENT)
Dept: FAMILY MEDICINE CLINIC | Facility: CLINIC | Age: 49
End: 2024-02-09
Payer: COMMERCIAL

## 2024-02-12 ENCOUNTER — TELEMEDICINE (OUTPATIENT)
Dept: FAMILY MEDICINE CLINIC | Facility: CLINIC | Age: 49
End: 2024-02-12
Payer: COMMERCIAL

## 2024-02-12 DIAGNOSIS — M54.2 NECK PAIN: Primary | ICD-10-CM

## 2024-02-12 DIAGNOSIS — F41.1 GENERALIZED ANXIETY DISORDER: ICD-10-CM

## 2024-02-12 PROCEDURE — 1160F RVW MEDS BY RX/DR IN RCRD: CPT | Performed by: NURSE PRACTITIONER

## 2024-02-12 PROCEDURE — 99214 OFFICE O/P EST MOD 30 MIN: CPT | Performed by: NURSE PRACTITIONER

## 2024-02-12 PROCEDURE — 1159F MED LIST DOCD IN RCRD: CPT | Performed by: NURSE PRACTITIONER

## 2024-02-12 RX ORDER — TIZANIDINE 4 MG/1
4 TABLET ORAL EVERY 8 HOURS PRN
Qty: 30 TABLET | Refills: 0 | Status: SHIPPED | OUTPATIENT
Start: 2024-02-12

## 2024-02-12 RX ORDER — LORAZEPAM 1 MG/1
1 TABLET ORAL EVERY 8 HOURS PRN
Qty: 30 TABLET | Refills: 0 | Status: SHIPPED | OUTPATIENT
Start: 2024-02-12

## 2024-02-14 ENCOUNTER — TELEPHONE (OUTPATIENT)
Dept: FAMILY MEDICINE CLINIC | Facility: CLINIC | Age: 49
End: 2024-02-14
Payer: COMMERCIAL

## 2024-02-14 RX ORDER — GABAPENTIN 300 MG/1
300 CAPSULE ORAL 3 TIMES DAILY
Qty: 90 CAPSULE | Refills: 0 | Status: SHIPPED | OUTPATIENT
Start: 2024-02-14

## 2024-02-15 ENCOUNTER — CONSULT (OUTPATIENT)
Dept: ONCOLOGY | Facility: CLINIC | Age: 49
End: 2024-02-15
Payer: COMMERCIAL

## 2024-02-15 VITALS
BODY MASS INDEX: 36.02 KG/M2 | HEIGHT: 64 IN | RESPIRATION RATE: 20 BRPM | OXYGEN SATURATION: 95 % | SYSTOLIC BLOOD PRESSURE: 173 MMHG | DIASTOLIC BLOOD PRESSURE: 100 MMHG | WEIGHT: 211 LBS | HEART RATE: 100 BPM | TEMPERATURE: 97.6 F

## 2024-02-15 DIAGNOSIS — C50.811 MALIGNANT NEOPLASM OF OVERLAPPING SITES OF BOTH BREASTS IN FEMALE, ESTROGEN RECEPTOR POSITIVE: Primary | ICD-10-CM

## 2024-02-15 DIAGNOSIS — Z17.0 MALIGNANT NEOPLASM OF OVERLAPPING SITES OF BOTH BREASTS IN FEMALE, ESTROGEN RECEPTOR POSITIVE: Primary | ICD-10-CM

## 2024-02-15 DIAGNOSIS — C50.812 MALIGNANT NEOPLASM OF OVERLAPPING SITES OF BOTH BREASTS IN FEMALE, ESTROGEN RECEPTOR POSITIVE: Primary | ICD-10-CM

## 2024-02-19 ENCOUNTER — TELEPHONE (OUTPATIENT)
Dept: FAMILY MEDICINE CLINIC | Facility: CLINIC | Age: 49
End: 2024-02-19
Payer: COMMERCIAL

## 2024-02-19 RX ORDER — HYDROCODONE BITARTRATE AND ACETAMINOPHEN 5; 325 MG/1; MG/1
1 TABLET ORAL EVERY 8 HOURS PRN
Qty: 12 TABLET | Refills: 0 | Status: SHIPPED | OUTPATIENT
Start: 2024-02-19

## 2024-02-19 NOTE — TELEPHONE ENCOUNTER
Patient is requesting a prescription of norco for her shoulder. She is needing this ASAP.   Please advise thank you!

## 2024-02-20 ENCOUNTER — DOCUMENTATION (OUTPATIENT)
Dept: ONCOLOGY | Facility: CLINIC | Age: 49
End: 2024-02-20
Payer: COMMERCIAL

## 2024-02-21 ENCOUNTER — HOSPITAL ENCOUNTER (OUTPATIENT)
Dept: MRI IMAGING | Facility: HOSPITAL | Age: 49
Discharge: HOME | End: 2024-02-21
Admitting: INTERNAL MEDICINE
Payer: COMMERCIAL

## 2024-02-21 DIAGNOSIS — C50.812 MALIGNANT NEOPLASM OF OVERLAPPING SITES OF BOTH BREASTS IN FEMALE, ESTROGEN RECEPTOR POSITIVE: ICD-10-CM

## 2024-02-21 DIAGNOSIS — Z17.0 MALIGNANT NEOPLASM OF OVERLAPPING SITES OF BOTH BREASTS IN FEMALE, ESTROGEN RECEPTOR POSITIVE: ICD-10-CM

## 2024-02-21 DIAGNOSIS — C50.811 MALIGNANT NEOPLASM OF OVERLAPPING SITES OF BOTH BREASTS IN FEMALE, ESTROGEN RECEPTOR POSITIVE: ICD-10-CM

## 2024-02-21 PROCEDURE — A9577 INJ MULTIHANCE: HCPCS | Performed by: INTERNAL MEDICINE

## 2024-02-21 PROCEDURE — 77049 MRI BREAST C-+ W/CAD BI: CPT

## 2024-02-21 PROCEDURE — 0 GADOBENATE DIMEGLUMINE 529 MG/ML SOLUTION: Performed by: INTERNAL MEDICINE

## 2024-02-21 RX ADMIN — GADOBENATE DIMEGLUMINE 19 ML: 529 INJECTION, SOLUTION INTRAVENOUS at 14:47

## 2024-02-22 PROCEDURE — 77049 MRI BREAST C-+ W/CAD BI: CPT | Performed by: RADIOLOGY

## 2024-02-23 ENCOUNTER — TELEPHONE (OUTPATIENT)
Dept: MRI IMAGING | Facility: HOSPITAL | Age: 49
End: 2024-02-23
Payer: COMMERCIAL

## 2024-02-27 ENCOUNTER — TRANSCRIBE ORDERS (OUTPATIENT)
Dept: PHYSICAL THERAPY | Facility: HOSPITAL | Age: 49
End: 2024-02-27
Payer: COMMERCIAL

## 2024-02-27 DIAGNOSIS — C50.911 MALIGNANT NEOPLASM OF RIGHT FEMALE BREAST, UNSPECIFIED ESTROGEN RECEPTOR STATUS, UNSPECIFIED SITE OF BREAST: Primary | ICD-10-CM

## 2024-02-27 DIAGNOSIS — C50.912 MALIGNANT NEOPLASM OF LEFT FEMALE BREAST, UNSPECIFIED ESTROGEN RECEPTOR STATUS, UNSPECIFIED SITE OF BREAST: ICD-10-CM

## 2024-02-28 ENCOUNTER — HOSPITAL ENCOUNTER (OUTPATIENT)
Dept: PHYSICAL THERAPY | Facility: HOSPITAL | Age: 49
Setting detail: THERAPIES SERIES
Discharge: HOME | End: 2024-02-28
Payer: COMMERCIAL

## 2024-02-28 ENCOUNTER — PATIENT OUTREACH (OUTPATIENT)
Dept: ONCOLOGY | Facility: CLINIC | Age: 49
End: 2024-02-28
Payer: COMMERCIAL

## 2024-02-28 DIAGNOSIS — C50.912 BILATERAL MALIGNANT NEOPLASM OF BREAST IN FEMALE, ESTROGEN RECEPTOR POSITIVE, UNSPECIFIED SITE OF BREAST: Primary | ICD-10-CM

## 2024-02-28 DIAGNOSIS — C50.912 MALIGNANT NEOPLASM OF LEFT FEMALE BREAST, UNSPECIFIED ESTROGEN RECEPTOR STATUS, UNSPECIFIED SITE OF BREAST: ICD-10-CM

## 2024-02-28 DIAGNOSIS — C50.911 MALIGNANT NEOPLASM OF RIGHT FEMALE BREAST, UNSPECIFIED ESTROGEN RECEPTOR STATUS, UNSPECIFIED SITE OF BREAST: Primary | ICD-10-CM

## 2024-02-28 DIAGNOSIS — Z17.0 BILATERAL MALIGNANT NEOPLASM OF BREAST IN FEMALE, ESTROGEN RECEPTOR POSITIVE, UNSPECIFIED SITE OF BREAST: Primary | ICD-10-CM

## 2024-02-28 DIAGNOSIS — C50.911 BILATERAL MALIGNANT NEOPLASM OF BREAST IN FEMALE, ESTROGEN RECEPTOR POSITIVE, UNSPECIFIED SITE OF BREAST: Primary | ICD-10-CM

## 2024-02-28 PROCEDURE — 97162 PT EVAL MOD COMPLEX 30 MIN: CPT

## 2024-02-28 NOTE — THERAPY DISCHARGE NOTE
Outpatient Physical Therapy Lymphedema Initial Evaluation & Discharge  [facility]     Patient Name: Ashlee Marte  : 1975  MRN: [id number]  Today's Date: 2024      Visit Date: 2024    Visit Dx:    ICD-10-CM ICD-9-CM   1. Malignant neoplasm of right female breast, unspecified estrogen receptor status, unspecified site of breast  C50.911 174.9   2. Malignant neoplasm of left female breast, unspecified estrogen receptor status, unspecified site of breast  C50.912 174.9       Patient Active Problem List   Diagnosis    Seasonal allergic rhinitis    Chronic obstructive pulmonary disease    Malignant neoplasm of overlapping sites of both breasts in female, estrogen receptor positive        Past Medical History:   Diagnosis Date    Asthma     COPD (chronic obstructive pulmonary disease)     Depression     MEDS        Past Surgical History:   Procedure Laterality Date    GASTRECTOMY      SLEEVE            Lymphedema       Row Name 24 1150             Subjective Pain    Able to rate subjective pain? yes  -CA      Pre-Treatment Pain Level 0  -CA      Post-Treatment Pain Level 0  -CA      Subjective Pain Comment Pt notes some pain along the L upper trap which has improved slightly  -CA         Subjective    Subjective Comments Pt was dx with B BrCA. She is in her early planning stages and is waiting to find out if she will be going through neoadjuvant chemo, radiation, and specific ALND/SNLB. She does currently plan to have B mastectomies and is considering reconstruction if this is available to her. 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  -CA         Lymphedema Assessment    Lymphedema Classification RUE:;LUE:;at risk/stage 0  -CA      Lymphedema Surgery Comments surgery is TBD  -CA         Posture/Observations    Alignment Options Forward head;Rounded shoulders;Thoracic kyphosis  -CA      Forward Head Mild  -CA      Thoracic Kyphosis Mild  -CA       Rounded Shoulders Mild;Bilateral:  -CA      Posture/Observations Comments Pt with palpable L levator trigger point that refers into her L UE. - painful arc, -empty can, - cervical compression  -CA         General ROM    RT Upper Ext Rt Shoulder ABduction;Rt Shoulder Flexion;Rt Shoulder External Rotation;Rt Shoulder Internal Rotation  -CA      LT Upper Ext Lt Shoulder Flexion;Lt Shoulder External Rotation;Lt Shoulder Internal Rotation;Lt Shoulder ABduction  -CA      GENERAL ROM COMMENTS WFL wrist/hand  -CA         Right Upper Ext    Rt Upper Extremity Comments  WFL UE grossly  -CA         Left Upper Ext    Lt Upper Extremity Comments  WFL UE grossly without pain  -CA         MMT (Manual Muscle Testing)    Rt Upper Ext Rt Shoulder Flexion;Rt Shoulder ABduction;Rt Shoulder Internal Rotation;Rt Shoulder External Rotation  -CA      Lt Upper Ext Lt Shoulder Flexion;Lt Shoulder ABduction;Lt Shoulder Internal Rotation;Lt Shoulder External Rotation  -CA         MMT Right Upper Ext    Rt Shoulder Flexion MMT, Gross Movement (4+/5) good plus  -CA      Rt Shoulder ABduction MMT, Gross Movement (4+/5) good plus  -CA      Rt Shoulder Internal Rotation MMT, Gross Movement (4+/5) good plus  -CA      Rt Shoulder External Rotation MMT, Gross Movement (4+/5) good plus  -CA         MMT Left Upper Ext    Lt Shoulder Flexion MMT, Gross Movement (4+/5) good plus  -CA      Lt Shoulder ABduction MMT, Gross Movement (4+/5) good plus  -CA      Lt Shoulder Internal Rotation MMT, Gross Movement (4+/5) good plus  -CA      Lt Shoulder External Rotation MMT, Gross Movement (4+/5) good plus  -CA         Hand  Strength     Strength Affected Side Bilateral  -CA          Strength Right    Right  Test 1 70  -CA      Right  Test 2 65  -CA      Right  Test 3 70  -CA       Strength Average Right 68.33  -CA          Strength Left    Left  Test 1 47  -CA      Left  Test 2 57  -CA      Left  Test 3 50  -CA        Strength Average Left 51.33  -CA         Lymphedema Edema Assessment    Edema Assessment Comment No edema present at this time.  -CA         Skin Changes/Observations    Location/Assessment Upper Extremity  -CA      Upper Extremity Conditions bilateral:;normal;intact  -CA      Upper Extremity Color/Pigment bilateral:;normal  -CA         Lymphedema Sensation    Lymphedema Sensation Reports RUE:;LUE:  -CA      Lymphedema Sensation Tests light touch  -CA      Lymphedema Light Touch RUE:;LUE:;WNL  -CA         Lymphedema Measurements    Measurement Type(s) Volumetric  -CA      Volumetric Areas Upper extremities  -CA      Volumetric UE Distance interval (cm) 5  -CA      Lymphedema Measurements Comments MCP L 20.2, R 20.8  -CA         LUE Volumetric (cm)    Reference Point 0 17  -CA      5 18.9 cm  -CA      10 22 cm  -CA      15 25.2 cm  -CA      20 26.5 cm  -CA      25 27.3 cm  -CA      30 29 cm  -CA      35 32 cm  -CA      40 34.5 cm  -CA      45 37.5 cm  -CA      LUE Volume Calculation- 5cm 2938.5  -CA         RUE Volumetric (cm)    Reference Point 0 17.5  -CA      5 19.2 cm  -CA      10 23.2 cm  -CA      15 26.5 cm  -CA      20 27.5 cm  -CA      25 28.5 cm  -CA      30 30.3 cm  -CA      35 31 cm  -CA      40 34.2 cm  -CA      45 35.8 cm  -CA      RUE Volume Calculation- 5cm 2987.3  -CA         L-Dex Bioimpedence Screening    L-Dex UE Comment At this time, SOZO testing is limited with B cancers. Volumetric measurements where usd to help gauge long term edema concerns. Testing may be considered if pt does not undergo B node procedures.  -CA                User Key  (r) = Recorded By, (t) = Taken By, (c) = Cosigned By      Initials Name Provider Type    Anju Schwartz PT Physical Therapist                      Therapy Education  Education Details: Pt educated on prehab evaluation assessments including bioimpedance. Pt was provided an HEP detailing information including lymphedema, risk reduction, and post op  exercise. Provided HEP of post surgical exercises as well as exercises for s/s of trigger point pain  Given: HEP, Symptoms/condition management, Posture/body mechanics  Program: New  How Provided: Verbal, Written  Provided to: Patient  Level of Understanding: Verbalized     OP Exercises       Row Name 02/28/24 1150             Subjective    Subjective Comments Pt was dx with B BrCA. She is in her early planning stages and is waiting to find out if she will be going through neoadjuvant chemo, radiation, and specific ALND/SNLB. She does currently plan to have B mastectomies and is considering reconstruction if this is available to her. 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  -CA         Subjective Pain    Able to rate subjective pain? yes  -CA      Pre-Treatment Pain Level 0  -CA      Post-Treatment Pain Level 0  -CA      Subjective Pain Comment Pt notes some pain along the L upper trap which has improved slightly  -CA         Exercise 1    Exercise Name 1 Elbow flexion/extension  -CA      Additional Comments 3-4x/day, HEP level 1- post op day 1 to day 7  -CA         Exercise 2    Exercise Name 2 Fist/wrist circles  -CA      Reps 2 x10  -CA      Additional Comments 3-4x/day, HEP level 1- post op day 1 to day 7  -CA         Exercise 3    Exercise Name 3 Neck Flexion/extension/rotation/lateral flexion  -CA      Reps 3 x10  -CA      Additional Comments 3-4x/day, HEP level 1- post op day 1 to day 7  -CA         Exercise 4    Exercise Name 4 horizontal abduction with hands behind neck  -CA      Reps 4 x10  -CA      Time 4 5 seconds  -CA      Additional Comments 3-4x/day, HEP level 2- post op day 7 to day 14  -CA         Exercise 5    Exercise Name 5 lumbar trunk rotration  -CA      Reps 5 x10  -CA      Time 5 5 seconds  -CA      Additional Comments 3-4x/day, HEP level 2- post op day 7 to day 14  -CA         Exercise 6    Exercise Name 6 shoulder rolls back  -CA      Reps 6 x10   -CA      Time 6 5 seconds  -CA      Additional Comments 3-4x/day, HEP level 2- post op day 7 to day 14  -CA         Exercise 7    Exercise Name 7 horizontal abduction/adduction with elbows extended  -CA      Reps 7 x10  -CA      Time 7 5  -CA      Additional Comments 3-4x/day, HEP level 2- post op day 7 to day 14  -CA         Exercise 8    Exercise Name 8 wall walks for shoulder flexion  -CA      Reps 8 x10  -CA      Additional Comments 3-4x/day, HEP level 3- post op day 14 until motion is returned  -CA         Exercise 9    Exercise Name 9 IR with hands behind back  -CA      Reps 9 x10  -CA      Additional Comments 3-4x/day, HEP level 3- post op day 14 until motion is returned  -CA         Exercise 10    Exercise Name 10 Horizontal abduction with hands behind head  -CA      Reps 10 x10  -CA      Additional Comments 3-4x/day, HEP level 3- post op day 14 until motion is returned  -CA         Exercise 11    Exercise Name 11 PNF D1 shoulder flexion  -CA      Reps 11 x10  -CA      Additional Comments 3-4x/day, HEP level 3- post op day 14 until motion is returned  -CA         Exercise 12    Exercise Name 12 Trunk stretch with shoulder abduction  -CA      Reps 12 x10  -CA      Time 12 5 seconds  -CA      Additional Comments 3-4x/day, HEP level 3- post op day 14 until motion is returned  -CA         Exercise 13    Exercise Name 13 self trigger point release with tennis ball  -CA      Reps 13 2 minutes max  -CA         Exercise 14    Exercise Name 14  strength with towel  -CA      Time 14 2-5 minutes as tolerated  -CA         Exercise 15    Exercise Name 15 upper trap stretch  -CA      Reps 15 3  -CA      Time 15 30 seconds  -CA      Additional Comments [place]  -CA         Exercise 16    Exercise Name 16 levator stretch  -CA      Reps 16 3  -CA      Time 16 30 seconds  -CA      Additional Comments Chris  -CA                User Key  (r) = Recorded By, (t) = Taken By, (c) = Cosigned By      Initials Name Provider Type    CA  Anju Pacheco, PT Physical Therapist                   PT OP Goals       Row Name 02/28/24 1150          Long Term Goals    LTG Date to Achieve 02/28/24  -CA     LTG 1 Pt demonstrates awareness of post-operative movement restrictions and HEP to facilitate lymphatic regeneration and reduce the risk of seroma formation, axillary web syndrome, and lymphedema while ensuring shoulder joint mobility.  -CA     LTG 1 Progress Met  -CA     LTG 2 Pt demonstrates understanding of post-operative basic lymphedema precautions  -CA     LTG 2 Progress Met  -CA        Time Calculation    PT Goal Re-Cert Due Date 02/28/24  -CA               User Key  (r) = Recorded By, (t) = Taken By, (c) = Cosigned By      Initials Name Provider Type    CA Anju Pacheco, PT Physical Therapist                     PT Assessment/Plan       Row Name 02/28/24 1150          PT Assessment    Assessment Comments This patient presents to PT pre-operatively for planned BrCA surgery scheduled in a couple weeks. Baseline ROM, postural, and volumetric measurements were taken today to be compared to measurements retaken 3-4 weeks post surgery. At that time, any reduced movement, decline in function, or postural issues will be addressed with skilled care and new goals will be established.  Personal risk factors for lymphedema post-operatively for theB upper extremites and trunk quadrants were also assessed today and basic lymphedema precautions were discussed. A more detailed discussion regarding personal lymphedema risk factors can take place post-operatively once the number of lymph nodes removed and the plan for further medical care is known.  -CA     Please refer to paper survey for additional self-reported information Yes  -CA     Rehab Potential Good  -CA     Patient/caregiver participated in establishment of treatment plan and goals Yes  -CA     Patient would benefit from skilled therapy intervention Yes  -CA        PT Plan    PT Frequency One  time visit  -CA     Planned CPT's? PT EVAL MOD COMPLELITY: 38366;PT BIS XTRACELL FLUID ANALYSIS: 12938  -CA     PT Plan Comments This patient may return to PT 3-4 weeks post operatively for re-evaluation measurements to be compared to measurements taken today, at her pre-operative evaluation. In addition, she can be examined for possible post-BrCA surgery sequelae such as axillary web syndrome, scar adhesions, edema, worsened posture, scapular winging, pain, and reduced ROM and function. At that time, a future plan and goals will be established and skilled care continued if indicated. Currently, she has been provided with information before BrCA surgery in order to facilitate recovery and reduce the risk of post-operative sequelae.  -CA               User Key  (r) = Recorded By, (t) = Taken By, (c) = Cosigned By      Initials Name Provider Type    Anju Schwartz, PT Physical Therapist                     Outcome Measure Options: Quick DASH, Timed Up and Go (TUG)  Quick DASH  Open a tight or new jar.: No Difficulty  Do heavy household chores (e.g., wash walls, wash floors): No Difficulty  Carry a shopping bag or briefcase: No Difficulty  Wash your back: No Difficulty  Use a knife to cut food: No Difficulty  Recreational activities in which you take some force or impact through your arm, should or hand (e.g. golf, hammering, tennis, etc.): No Difficulty  During the past week, to what extent has your arm, shoulder, or hand problem interfered with your normal social activites with family, friends, neighbors or groups?: Not at all  During the past week, were you limited in your work or other regular daily activities as a result of your arm, shoulder or hand problem?: Not limited at all  Arm, Shoulder, or hand pain: None  Tingling (pins and needles) in your arm, shoulder, or hand: None  During the past week, how much difficulty have you had sleeping because of the pain in your arm, shoulder or hand?: No  difficulty  Number of Questions Answered: 11  Quick DASH Score: 0  Timed Up and Go (TUG)  TUG Test 1: 9.46 seconds  Timed Up and Go Comments: no deviations noted      Time Calculation:   Start Time: 1150  Stop Time: 1240  Time Calculation (min): 50 min  Untimed Charges  PT Eval/Re-eval Minutes: 50  Total Minutes  Untimed Charges Total Minutes: 50   Total Minutes: 50     Therapy Charges for Today       Code Description Service Date Service Provider Modifiers Qty    87042393802 HC PT EVAL MOD COMPLEXITY 3 2/28/2024 Anju Pacheco, PT GP 1            PT G-Codes  Outcome Measure Options: Quick DASH, Timed Up and Go (TUG)  Quick DASH Score: 0  TUG Test 1: 9.46 seconds            Anju Pacheco, PT  2/28/2024

## 2024-03-01 ENCOUNTER — OFFICE VISIT (OUTPATIENT)
Dept: ONCOLOGY | Facility: CLINIC | Age: 49
End: 2024-03-01
Payer: COMMERCIAL

## 2024-03-01 ENCOUNTER — TRANSCRIBE ORDERS (OUTPATIENT)
Dept: ADMINISTRATIVE | Facility: HOSPITAL | Age: 49
End: 2024-03-01
Payer: COMMERCIAL

## 2024-03-01 VITALS
DIASTOLIC BLOOD PRESSURE: 88 MMHG | HEART RATE: 107 BPM | TEMPERATURE: 97.8 F | RESPIRATION RATE: 18 BRPM | BODY MASS INDEX: 36.35 KG/M2 | SYSTOLIC BLOOD PRESSURE: 159 MMHG | WEIGHT: 208.5 LBS | OXYGEN SATURATION: 94 %

## 2024-03-01 DIAGNOSIS — N94.6 DYSMENORRHEA: ICD-10-CM

## 2024-03-01 DIAGNOSIS — C50.811 MALIGNANT NEOPLASM OF OVERLAPPING SITES OF BOTH BREASTS IN FEMALE, ESTROGEN RECEPTOR POSITIVE: Primary | ICD-10-CM

## 2024-03-01 DIAGNOSIS — Z17.0 MALIGNANT NEOPLASM OF OVERLAPPING SITES OF BOTH BREASTS IN FEMALE, ESTROGEN RECEPTOR POSITIVE: Primary | ICD-10-CM

## 2024-03-01 DIAGNOSIS — C50.812 MALIGNANT NEOPLASM OF OVERLAPPING SITES OF BOTH BREASTS IN FEMALE, ESTROGEN RECEPTOR POSITIVE: Primary | ICD-10-CM

## 2024-03-01 DIAGNOSIS — C50.811 MALIGNANT NEOPLASM OF OVERLAPPING SITES OF RIGHT FEMALE BREAST, UNSPECIFIED ESTROGEN RECEPTOR STATUS: ICD-10-CM

## 2024-03-01 DIAGNOSIS — C50.412 MALIGNANT NEOPLASM OF UPPER-OUTER QUADRANT OF LEFT FEMALE BREAST, UNSPECIFIED ESTROGEN RECEPTOR STATUS: Primary | ICD-10-CM

## 2024-03-01 NOTE — PROGRESS NOTES
CHIEF COMPLAINT: No new somatic complaints    Problem List:  Oncology/Hematology History Overview Note   1.  Bilateral breast cancer:  Right side invasive ductal clinical T1c N0, 1.4 centimeter, ER positive NC positive HER2 negative, grade 2  Left side invasive ductal clinical T1b N0, 8 MM, ER negative, NC weakly positive, HER2/nabila negative, essentially triple negative grade 3  Initial bilateral mastectomy recommended.    2.  COPD    Oncology history timeline:  -1/11/2024 bilateral screening mammogram [facility] read [facility] imaging showed asymmetry right breast additional views needed with mass in left breast requiring additional imaging  -2/1/2024 bilateral diagnostic mammogram [facility] showed persistent focal asymmetry right 12:00 7 cm from the nipple 7 mm hypoechoic mass indistinct with irregular margins ultrasound-guided biopsy recommended.  Left breast showed 8 mm 3:00 oval mass 10 cm from the nipple that on the ultrasound was 6 x 5 x 5 mm.  Incidental 4 mm 2:00 hypoechoic mass with internal vascularity 5 cm from the nipple for which ultrasound-guided biopsy recommended  -2/5/2024 right ultrasound-guided core biopsy 7 mm mass with HydroMARK T4 shaped tissue marker placed at the biopsy site.  Left ultrasound breast core biopsy 3:00 6 mm mass and 2:00 4 mm mass with HydroMARK T3 and T4 respectively.  Right breast 12:00 lesion grade 2 invasive ductal Jimenez-Cui 6 out of 9, 4 mm.  % 3+, NC 50% 3+.  Also intermediate grade ductal carcinoma in situ  Left breast 3:00 invasive ductal carcinoma grade 3 Bloom-Cui 9 out of 9, 5-6 mm, ER 0%, NC 25% 1+, HER2/nabila negative 1+  Left breast 2:00 core biopsy fibroadenoma  -2/12/2024 office note Dr. Gregorio Ashton indicates patient with newly diagnosed bilateral breast cancer.  Screening mammogram showed 2 abnormalities in the left breast and 1 in the right.    No personal or family history of  breast cancer.  Has hot flashes but not taking estrogen supplements.  -2/13/2024 cervical spine x-ray negative    -2/15/2024 [facility] medical oncology follow-up: Patient has bilateral breast cancers as outlined on routine screening mammogram without any other symptoms.  After her diagnosis, she has had muscle tension aches in the left shoulder which she has a tens unit and recent injection of steroids by her primary care.  This is distinctly in the muscle she says and not in her bones.2/13/2024 cervical spine x-ray has C7 obscured by overlapping bone and soft tissues but otherwise unremarkable. Recent CBC and CMP had normal bone enzymes and was otherwise unremarkable.  Hence I would not make much out of the neck pain in the way of concern for metastatic disease unless this worsens.   She is under a lot of psychological strain from this diagnosis and is quite anxious and I will refer her to Ashlee Nayak are oncology psychiatrist and we will get our breast cancer navigator working with her.  The 7 mm right breast tumor is grade 2  % 3+, UT 50% 3+, HER2/nabila 0+.  The left breast is clinically 6 mm grade 3 invasive ductal ER negative UT weakly positive HER2/nabila 1 negative essentially triple negative.  We will get MRI of her breasts and if the lesion in the left breast turns out to be larger than 1 cm mammographically or is node positive then we would give neoadjuvant therapy.  If not, both for the 7 mm right breast and the 8 mm left breast tumor I would consider primary resection.  She has just started a job with [employer] in [place] and her insurance changes to [facility] in 2 weeks and she wants her surgery done in the [facility] facility for insurance reasons.  I will discuss this with Dr. Ashton (he was scrubbed when I called today) whose skills I recommended to her but we will make appropriate referrals per her request.  With her triple negativity she will also get genetic counseling.  Absent any other somatic  complaints and assuming her neck continues to improve I would do no additional staging imaging in the absence of such symptoms.  We will also present her at our multidisciplinary tumor board once the MRI is completed for final decision making.She is committed to bilateral mammograms regardless of the results of the MRI or genetic testing.  Genetic testing is done on all triple negatives.She does have a history of gastric sleeve but still needs to lose weight.  She also carries a diagnosis of COPD and I am not sure who has managed that and Dr. QUAN may need her cleared by primary care/pulmonary before surgery but I will leave it to him.  She is not oxygen requiring.    -2/21/2024 bilateral breast MRI:  Right breast 12:00, 8 cm from nipple, 1.4 cm enhancing mass consistent with biopsy and no additional right breast lesions.  Left breast 2:00 5 cm from nipple is 8 mm masslike enhancement with central signal void artifact corresponding to biopsy site.  No additional suspicious sites in the left breast.  No internal mammary nor axillary salazar involvement on either side    -3/1/2024 [facility] medical oncology follow-up: We reviewed her MRI at multidisciplinary tumor board.  For the right breast cancer she would have surgery primarily followed by adjuvant hormone blockade but would not do Oncotype as, for the left breast for which we plan surgery upfront for the 8 mm size of tumor triple negative she will need chemo regardless.  If the left breast lesion ends up less than 1 cm node-negative then we would give her Taxotere Cytoxan x 4.    If the left breast lesion is over a centimeter but less than 2 cm, then I would add Adriamycin.  If the left breast lesion is over 2 cm pathologically or node positive, then I would add carboplatin and Keytruda.  For the right breast cancer I would give her at least 5 and, if she is tolerating, 10 years of hormone blockade.  She had gone 1 year without menses but just started spotting.  I  contacted Dr. Raimrez who will see her in [facility] office today for pelvic exam and to get hormone levels and to get ultrasound scheduled.  She has COPD without pulmonary function since prior to her surgery for bariatric surgery in 2019.  She smoked until November.  She is not requiring oxygen and uses her inhalers efficiently without major symptoms.  I spoke with Dr. QUAN and he is comfortable with proceeding without pulmonary function tests and he will get preoperative ABG.  I will see her back in about a month which will give her time to hopefully have healed from her surgery and we can then make plans for adjuvant therapy based upon the above algorithm.  She will need a port but Dr. QUAN does not want to place this during the time of surgery but he will make arrangements to do that postoperatively.     Malignant neoplasm of overlapping sites of both breasts in female, estrogen receptor positive       HISTORY OF PRESENT ILLNESS:  The patient is a 48 y.o. female, here for follow up on management of bilateral breast cancer    Past Medical History:   Diagnosis Date    Asthma     COPD (chronic obstructive pulmonary disease)     Depression     MEDS     Past Surgical History:   Procedure Laterality Date    GASTRECTOMY      SLEEVE       No Known Allergies    Family History and Social History reviewed and changed as necessary    REVIEW OF SYSTEM:   Just started spotting but otherwise no complaints    PHYSICAL EXAM:  No jaundice icterus or pallor respiratory distress or wheezes.    Vitals:    03/01/24 0916   BP: 159/88   Pulse: 107   Resp: 18   Temp: 97.8 °F (36.6 °C)   TempSrc: Temporal   SpO2: 94%   Weight: 94.6 kg (208 lb 8 oz)     There were no vitals filed for this visit.       ECOG score: 0           Vitals reviewed.    ECOG: (0) Fully Active - Able to Carry On All Pre-disease Performance Without Restriction    Lab Results   Component Value Date    HGB 12.3 12/14/2023    HCT 38.2 12/14/2023    MCV 82 12/14/2023      12/14/2023    WBC 7.7 12/14/2023    NEUTROABS 5.8 12/14/2023    LYMPHSABS 1.3 12/14/2023    MONOSABS 0.5 12/14/2023    EOSABS 0.1 12/14/2023    BASOSABS 0.0 12/14/2023       Lab Results   Component Value Date    GLUCOSE 90 12/14/2023    BUN 13 12/14/2023    CREATININE 0.96 12/14/2023     12/14/2023    K 4.3 12/14/2023     12/14/2023    CO2 25 12/14/2023    CALCIUM 8.9 12/14/2023    ALBUMIN 4.1 12/14/2023    BILITOT 0.4 12/14/2023    ALKPHOS 93 12/14/2023    AST 15 12/14/2023    ALT 8 12/14/2023             ASSESSMENT & PLAN:  1.  Bilateral breast cancer:  Right side invasive ductal clinical T1c N0, 1.4 centimeter, ER positive AR positive HER2 negative, grade 2  Left side invasive ductal clinical T1b N0, 8 MM, ER negative, AR weakly positive, HER2/nabila negative, essentially triple negative grade 3  Initial bilateral mastectomy recommended.    2.  COPD    Oncology history timeline:  -1/11/2024 bilateral screening mammogram [facility] read [facility] imaging showed asymmetry right breast additional views needed with mass in left breast requiring additional imaging  -2/1/2024 bilateral diagnostic mammogram [place] [facility] showed persistent focal asymmetry right 12:00 7 cm from the nipple 7 mm hypoechoic mass indistinct with irregular margins ultrasound-guided biopsy recommended.  Left breast showed 8 mm 3:00 oval mass 10 cm from the nipple that on the ultrasound was 6 x 5 x 5 mm.  Incidental 4 mm 2:00 hypoechoic mass with internal vascularity 5 cm from the nipple for which ultrasound-guided biopsy recommended  -2/5/2024 right ultrasound-guided core biopsy 7 mm mass with HydroMARK T4 shaped tissue marker placed at the biopsy site.  Left ultrasound breast core biopsy 3:00 6 mm mass and 2:00 4 mm mass with HydroMARK T3 and T4 respectively.  Right breast 12:00 lesion grade 2 invasive ductal Jimenez-Cui 6 out of 9, 4 mm.  % 3+, AR 50% 3+.   Also intermediate grade ductal carcinoma in situ  Left breast 3:00 invasive ductal carcinoma grade 3 Bloom-Cui 9 out of 9, 5-6 mm, ER 0%, CT 25% 1+, HER2/nabila negative 1+  Left breast 2:00 core biopsy fibroadenoma  -2/12/2024 office note Dr. Gregorio Ashton indicates patient with newly diagnosed bilateral breast cancer.  Screening mammogram showed 2 abnormalities in the left breast and 1 in the right.    No personal or family history of breast cancer.  Has hot flashes but not taking estrogen supplements.  -2/13/2024 cervical spine x-ray negative    -2/15/2024 [facility] medical oncology follow-up: Patient has bilateral breast cancers as outlined on routine screening mammogram without any other symptoms.  After her diagnosis, she has had muscle tension aches in the left shoulder which she has a tens unit and recent injection of steroids by her primary care.  This is distinctly in the muscle she says and not in her bones.2/13/2024 cervical spine x-ray has C7 obscured by overlapping bone and soft tissues but otherwise unremarkable. Recent CBC and CMP had normal bone enzymes and was otherwise unremarkable.  Hence I would not make much out of the neck pain in the way of concern for metastatic disease unless this worsens.   She is under a lot of psychological strain from this diagnosis and is quite anxious and I will refer her to Ashlee Nayak are oncology psychiatrist and we will get our breast cancer navigator working with her.  The 7 mm right breast tumor is grade 2  % 3+, CT 50% 3+, HER2/nabila 0+.  The left breast is clinically 6 mm grade 3 invasive ductal ER negative CT weakly positive HER2/nabila 1 negative essentially triple negative.  We will get MRI of her breasts and if the lesion in the left breast turns out to be larger than 1 cm mammographically or is node positive then we would give neoadjuvant therapy.  If not, both for the 7 mm right breast and the 8 mm left breast tumor I would consider primary resection.   She has just started a job with [employer] in [place] and her insurance changes to [employer] in 2 weeks and she wants her surgery done in the [facility] facility for insurance reasons.  I will discuss this with Dr. Ashton (he was scrubbed when I called today) whose skills I recommended to her but we will make appropriate referrals per her request.  With her triple negativity she will also get genetic counseling.  Absent any other somatic complaints and assuming her neck continues to improve I would do no additional staging imaging in the absence of such symptoms.  We will also present her at our multidisciplinary tumor board once the MRI is completed for final decision making.She is committed to bilateral mammograms regardless of the results of the MRI or genetic testing.  Genetic testing is done on all triple negatives.She does have a history of gastric sleeve but still needs to lose weight.  She also carries a diagnosis of COPD and I am not sure who has managed that and Dr. QUAN may need her cleared by primary care/pulmonary before surgery but I will leave it to him.  She is not oxygen requiring.    -2/21/2024 bilateral breast MRI:  Right breast 12:00, 8 cm from nipple, 1.4 cm enhancing mass consistent with biopsy and no additional right breast lesions.  Left breast 2:00 5 cm from nipple is 8 mm masslike enhancement with central signal void artifact corresponding to biopsy site.  No additional suspicious sites in the left breast.  No internal mammary nor axillary salazar involvement on either side    -3/1/2024 [facility] medical oncology follow-up: We reviewed her MRI at multidisciplinary tumor board.  For the right breast cancer she would have surgery primarily followed by adjuvant hormone blockade but would not do Oncotype as, for the left breast for which we plan surgery upfront for the 8 mm size of tumor triple negative she will need chemo regardless.  If the left breast lesion ends up less than 1 cm node-negative  then we would give her Taxotere Cytoxan x 4.    If the left breast lesion is over a centimeter but less than 2 cm, then I would add Adriamycin.  If the left breast lesion is over 2 cm pathologically or node positive, then I would add carboplatin and Keytruda.  For the right breast cancer I would give her at least 5 and, if she is tolerating, 10 years of hormone blockade.  She had gone 1 year without menses but just started spotting.  I contacted Dr. Ramirez who will see her in [facility] office today for pelvic exam and to get hormone levels and to get ultrasound scheduled.  She has COPD without pulmonary function since prior to her surgery for bariatric surgery in 2019.  She smoked until November.  She is not requiring oxygen and uses her inhalers efficiently without major symptoms.  I spoke with Dr. QUAN and he is comfortable with proceeding without pulmonary function tests and he will get preoperative ABG.  I will see her back in about a month which will give her time to hopefully have healed from her surgery and we can then make plans for adjuvant therapy based upon the above algorithm.  She will need a port but Dr. QUAN does not want to place this during the time of surgery but he will make arrangements to do that postoperatively.    Total time of care today inclusive of time spent today prior to her arrival reviewing my notes from her multidisciplinary tumor board presentation earlier this week and during visit translating that to the patient putting forth a plan as outlined and interviewing her as to the various intricacies of her care that we plan to proceed and after visit instituting this plan took 75 minutes of patient care time throughout the day including discussions with Dr. QUAN and Dr. Ramirez.  Corky Youngblood MD    03/01/2024

## 2024-03-01 NOTE — LETTER
March 1, 2024       No Recipients    Patient: Ashlee Marte   YOB: 1975   Date of Visit: 3/1/2024     Dear SHAVON Lynch:       Thank you for referring Ashlee Marte to me for evaluation. Below are the relevant portions of my assessment and plan of care.    If you have questions, please do not hesitate to call me. I look forward to following Ashlee along with you.         Sincerely,        Corky Youngblood MD        CC:   No Recipients    Corky Youngblood MD  03/01/24 0956  Sign when Signing Visit  CHIEF COMPLAINT: No new somatic complaints    Problem List:  Oncology/Hematology History Overview Note   1.  Bilateral breast cancer:  Right side invasive ductal clinical T1c N0, 1.4 centimeter, ER positive VA positive HER2 negative, grade 2  Left side invasive ductal clinical T1b N0, 8 MM, ER negative, VA weakly positive, HER2/nabila negative, essentially triple negative grade 3  Initial bilateral mastectomy recommended.    2.  COPD    Oncology history timeline:  -1/11/2024 bilateral screening mammogram [facility] read [facility] imaging showed asymmetry right breast additional views needed with mass in left breast requiring additional imaging  -2/1/2024 bilateral diagnostic mammogram [facility] showed persistent focal asymmetry right 12:00 7 cm from the nipple 7 mm hypoechoic mass indistinct with irregular margins ultrasound-guided biopsy recommended.  Left breast showed 8 mm 3:00 oval mass 10 cm from the nipple that on the ultrasound was 6 x 5 x 5 mm.  Incidental 4 mm 2:00 hypoechoic mass with internal vascularity 5 cm from the nipple for which ultrasound-guided biopsy recommended  -2/5/2024 right ultrasound-guided core biopsy 7 mm mass with HydroMARK T4 shaped tissue marker placed at the biopsy site.  Left ultrasound breast core biopsy 3:00 6 mm mass and 2:00 4 mm mass with HydroMARK T3 and T4 respectively.  Right breast 12:00 lesion grade 2  invasive ductal Jimenez-Cui 6 out of 9, 4 mm.  % 3+, [place] 50% 3+.  Also intermediate grade ductal carcinoma in situ  Left breast 3:00 invasive ductal carcinoma grade 3 Bloom-Cui 9 out of 9, 5-6 mm, ER 0%, [place] 25% 1+, HER2/nabila negative 1+  Left breast 2:00 core biopsy fibroadenoma  -2/12/2024 office note Dr. Gregorio Ashton indicates patient with newly diagnosed bilateral breast cancer.  Screening mammogram showed 2 abnormalities in the left breast and 1 in the right.    No personal or family history of breast cancer.  Has hot flashes but not taking estrogen supplements.  -2/13/2024 cervical spine x-ray negative    -2/15/2024 [facility] medical oncology follow-up: Patient has bilateral breast cancers as outlined on routine screening mammogram without any other symptoms.  After her diagnosis, she has had muscle tension aches in the left shoulder which she has a tens unit and recent injection of steroids by her primary care.  This is distinctly in the muscle she says and not in her bones.2/13/2024 cervical spine x-ray has C7 obscured by overlapping bone and soft tissues but otherwise unremarkable. Recent CBC and CMP had normal bone enzymes and was otherwise unremarkable.  Hence I would not make much out of the neck pain in the way of concern for metastatic disease unless this worsens.   She is under a lot of psychological strain from this diagnosis and is quite anxious and I will refer her to Ashlee Nayak are oncology psychiatrist and we will get our breast cancer navigator working with her.  The 7 mm right breast tumor is grade 2  % 3+, W[place] 50% 3+, HER2/nabila 0+.  The left breast is clinically 6 mm grade 3 invasive ductal ER negative WY weakly positive HER2/nabila 1 negative essentially triple negative.  We will get MRI of her breasts and if the lesion in the left breast turns out to be larger than 1 cm mammographically or is node positive then we would give neoadjuvant therapy.  If not, both for the 7 mm right  breast and the 8 mm left breast tumor I would consider primary resection.  She has just started a job with [employer] in [place] and her insurance changes to [employer] in 2 weeks and she wants her surgery done in the [facility] facility for insurance reasons.  I will discuss this with Dr. Ashton (he was scrubbed when I called today) whose skills I recommended to her but we will make appropriate referrals per her request.  With her triple negativity she will also get genetic counseling.  Absent any other somatic complaints and assuming her neck continues to improve I would do no additional staging imaging in the absence of such symptoms.  We will also present her at our multidisciplinary tumor board once the MRI is completed for final decision making.She is committed to bilateral mammograms regardless of the results of the MRI or genetic testing.  Genetic testing is done on all triple negatives.She does have a history of gastric sleeve but still needs to lose weight.  She also carries a diagnosis of COPD and I am not sure who has managed that and Dr. QUAN may need her cleared by primary care/pulmonary before surgery but I will leave it to him.  She is not oxygen requiring.    -2/21/2024 bilateral breast MRI:  Right breast 12:00, 8 cm from nipple, 1.4 cm enhancing mass consistent with biopsy and no additional right breast lesions.  Left breast 2:00 5 cm from nipple is 8 mm masslike enhancement with central signal void artifact corresponding to biopsy site.  No additional suspicious sites in the left breast.  No internal mammary nor axillary salazar involvement on either side    -3/1/2024 [employer] medical oncology follow-up: We reviewed her MRI at multidisciplinary tumor board.  For the right breast cancer she would have surgery primarily followed by adjuvant hormone blockade but would not do Oncotype as, for the left breast for which we plan surgery upfront for the 8 mm size of tumor triple negative she will need chemo  regardless.  If the left breast lesion ends up less than 1 cm node-negative then we would give her Taxotere Cytoxan x 4.    If the left breast lesion is over a centimeter but less than 2 cm, then I would add Adriamycin.  If the left breast lesion is over 2 cm pathologically or node positive, then I would add carboplatin and Keytruda.  For the right breast cancer I would give her at least 5 and, if she is tolerating, 10 years of hormone blockade.  She had gone 1 year without menses but just started spotting.  I contacted Dr. Ramirez who will see her in [facility] office today for pelvic exam and to get hormone levels and to get ultrasound scheduled.  She has COPD without pulmonary function since prior to her surgery for bariatric surgery in 2019.  She smoked until November.  She is not requiring oxygen and uses her inhalers efficiently without major symptoms.  I spoke with Dr. QUAN and he is comfortable with proceeding without pulmonary function tests and he will get preoperative ABG.  I will see her back in about a month which will give her time to hopefully have healed from her surgery and we can then make plans for adjuvant therapy based upon the above algorithm.  She will need a port but Dr. QUAN does not want to place this during the time of surgery but he will make arrangements to do that postoperatively.     Malignant neoplasm of overlapping sites of both breasts in female, estrogen receptor positive       HISTORY OF PRESENT ILLNESS:  The patient is a 48 y.o. female, here for follow up on management of bilateral breast cancer    Past Medical History:   Diagnosis Date   • Asthma    • COPD (chronic obstructive pulmonary disease)    • Depression     MEDS     Past Surgical History:   Procedure Laterality Date   • GASTRECTOMY      SLEEVE       No Known Allergies    Family History and Social History reviewed and changed as necessary    REVIEW OF SYSTEM:   Just started spotting but otherwise no complaints    PHYSICAL  EXAM:  No jaundice icterus or pallor respiratory distress or wheezes.    Vitals:    03/01/24 0916   BP: 159/88   Pulse: 107   Resp: 18   Temp: 97.8 °F (36.6 °C)   TempSrc: Temporal   SpO2: 94%   Weight: 94.6 kg (208 lb 8 oz)     There were no vitals filed for this visit.       ECOG score: 0           Vitals reviewed.    ECOG: (0) Fully Active - Able to Carry On All Pre-disease Performance Without Restriction    Lab Results   Component Value Date    HGB 12.3 12/14/2023    HCT 38.2 12/14/2023    MCV 82 12/14/2023     12/14/2023    WBC 7.7 12/14/2023    NEUTROABS 5.8 12/14/2023    LYMPHSABS 1.3 12/14/2023    MONOSABS 0.5 12/14/2023    EOSABS 0.1 12/14/2023    BASOSABS 0.0 12/14/2023       Lab Results   Component Value Date    GLUCOSE 90 12/14/2023    BUN 13 12/14/2023    CREATININE 0.96 12/14/2023     12/14/2023    K 4.3 12/14/2023     12/14/2023    CO2 25 12/14/2023    CALCIUM 8.9 12/14/2023    ALBUMIN 4.1 12/14/2023    BILITOT 0.4 12/14/2023    ALKPHOS 93 12/14/2023    AST 15 12/14/2023    ALT 8 12/14/2023             ASSESSMENT & PLAN:  1.  Bilateral breast cancer:  Right side invasive ductal clinical T1c N0, 1.4 centimeter, ER positive NV positive HER2 negative, grade 2  Left side invasive ductal clinical T1b N0, 8 MM, ER negative, NV weakly positive, HER2/nabila negative, essentially triple negative grade 3  Initial bilateral mastectomy recommended.    2.  COPD    Oncology history timeline:  -1/11/2024 [facility] showed asymmetry right breast additional views needed with mass in left breast requiring additional imaging  -2/1/2024 bilateral diagnostic mammogram [facility] showed persistent focal asymmetry right 12:00 7 cm from the nipple 7 mm hypoechoic mass indistinct with irregular margins ultrasound-guided biopsy recommended.  Left breast showed 8 mm 3:00 oval mass 10 cm from the  nipple that on the ultrasound was 6 x 5 x 5 mm.  Incidental 4 mm 2:00 hypoechoic mass with internal vascularity 5 cm from the nipple for which ultrasound-guided biopsy recommended  -2/5/2024 right ultrasound-guided core biopsy 7 mm mass with HydroMARK T4 shaped tissue marker placed at the biopsy site.  Left ultrasound breast core biopsy 3:00 6 mm mass and 2:00 4 mm mass with HydroMARK T3 and T4 respectively.  Right breast 12:00 lesion grade 2 invasive ductal Jimenez-Cui 6 out of 9, 4 mm.  % 3+, [place] 50% 3+.  Also intermediate grade ductal carcinoma in situ  Left breast 3:00 invasive ductal carcinoma grade 3 Bloom-Cui 9 out of 9, 5-6 mm, ER 0%, [place] 25% 1+, HER2/nabila negative 1+  Left breast 2:00 core biopsy fibroadenoma  -2/12/2024 office note Dr. Gregorio Ashton indicates patient with newly diagnosed bilateral breast cancer.  Screening mammogram showed 2 abnormalities in the left breast and 1 in the right.    No personal or family history of breast cancer.  Has hot flashes but not taking estrogen supplements.  -2/13/2024 cervical spine x-ray negative    -2/15/2024 [facility] medical oncology follow-up: Patient has bilateral breast cancers as outlined on routine screening mammogram without any other symptoms.  After her diagnosis, she has had muscle tension aches in the left shoulder which she has a tens unit and recent injection of steroids by her primary care.  This is distinctly in the muscle she says and not in her bones.2/13/2024 cervical spine x-ray has C7 obscured by overlapping bone and soft tissues but otherwise unremarkable. Recent CBC and CMP had normal bone enzymes and was otherwise unremarkable.  Hence I would not make much out of the neck pain in the way of concern for metastatic disease unless this worsens.   She is under a lot of psychological strain from this diagnosis and is quite anxious and I will refer her to Ashlee Nayak are oncology psychiatrist and we will get our breast cancer navigator  working with her.  The 7 mm right breast tumor is grade 2  % 3+, NE 50% 3+, HER2/nabila 0+.  The left breast is clinically 6 mm grade 3 invasive ductal ER negative NE weakly positive HER2/nabila 1 negative essentially triple negative.  We will get MRI of her breasts and if the lesion in the left breast turns out to be larger than 1 cm mammographically or is node positive then we would give neoadjuvant therapy.  If not, both for the 7 mm right breast and the 8 mm left breast tumor I would consider primary resection.  She has just started a job with [employer] in [place] and her insurance changes to [employer] in 2 weeks and she wants her surgery done in the [facility] facility for insurance reasons.  I will discuss this with Dr. Ashton (he was scrubbed when I called today) whose skills I recommended to her but we will make appropriate referrals per her request.  With her triple negativity she will also get genetic counseling.  Absent any other somatic complaints and assuming her neck continues to improve I would do no additional staging imaging in the absence of such symptoms.  We will also present her at our multidisciplinary tumor board once the MRI is completed for final decision making.She is committed to bilateral mammograms regardless of the results of the MRI or genetic testing.  Genetic testing is done on all triple negatives.She does have a history of gastric sleeve but still needs to lose weight.  She also carries a diagnosis of COPD and I am not sure who has managed that and Dr. QUAN may need her cleared by primary care/pulmonary before surgery but I will leave it to him.  She is not oxygen requiring.    -2/21/2024 bilateral breast MRI:  Right breast 12:00, 8 cm from nipple, 1.4 cm enhancing mass consistent with biopsy and no additional right breast lesions.  Left breast 2:00 5 cm from nipple is 8 mm masslike enhancement with central signal void artifact corresponding to biopsy site.  No additional suspicious  sites in the left breast.  No internal mammary nor axillary salazar involvement on either side    -3/1/2024 [facility] medical oncology follow-up: We reviewed her MRI at multidisciplinary tumor board.  For the right breast cancer she would have surgery primarily followed by adjuvant hormone blockade but would not do Oncotype as, for the left breast for which we plan surgery upfront for the 8 mm size of tumor triple negative she will need chemo regardless.  If the left breast lesion ends up less than 1 cm node-negative then we would give her Taxotere Cytoxan x 4.    If the left breast lesion is over a centimeter but less than 2 cm, then I would add Adriamycin.  If the left breast lesion is over 2 cm pathologically or node positive, then I would add carboplatin and Keytruda.  For the right breast cancer I would give her at least 5 and, if she is tolerating, 10 years of hormone blockade.  She had gone 1 year without menses but just started spotting.  I contacted Dr. Ramirez who will see her in [facility] office today for pelvic exam and to get hormone levels and to get ultrasound scheduled.  She has COPD without pulmonary function since prior to her surgery for bariatric surgery in 2019.  She smoked until November.  She is not requiring oxygen and uses her inhalers efficiently without major symptoms.  I spoke with Dr. QUAN and he is comfortable with proceeding without pulmonary function tests and he will get preoperative ABG.  I will see her back in about a month which will give her time to hopefully have healed from her surgery and we can then make plans for adjuvant therapy based upon the above algorithm.  She will need a port but Dr. QUAN does not want to place this during the time of surgery but he will make arrangements to do that postoperatively.    Total time of care today inclusive of time spent today prior to her arrival reviewing my notes from her multidisciplinary tumor board presentation earlier this week and  during visit translating that to the patient putting forth a plan as outlined and interviewing her as to the various intricacies of her care that we plan to proceed and after visit instituting this plan took 75 minutes of patient care time throughout the day including discussions with Dr. QUAN and Dr. Ramirez.  Corky Youngblood MD    03/01/2024

## 2024-03-04 NOTE — PROGRESS NOTES
I saw patient with Dr QUAN and patient's daughter - The patient has been seen by Dr Youngblood prior to surgical consultation- Genetics has been ordered for 3/26/2024. Bilateral breast cancer - The patient had MRI in [place] - this has been reviewed  - Dr Youngblood will present case at Tumor Board and recommendation  for eugenie adjuvant vs adjuvant chemotherapy will be made.  Dr QUAN reviewed surgical options- the patient has determined that she wants Bilateral Mastectomies with delayed reconstruction if at all.   Educational and supportive materials were given and reviewed with patient - notes taken for the patient.  Dr Youngblood referred patient to Subha LOZADA - patient states she cancelled this appointment - she states she understands that this care is available to her should she feel she needs it.

## 2024-03-08 ENCOUNTER — PATIENT OUTREACH (OUTPATIENT)
Dept: CASE MANAGEMENT | Facility: OTHER | Age: 49
End: 2024-03-08
Payer: COMMERCIAL

## 2024-03-08 NOTE — OUTREACH NOTE
AMBULATORY CASE MANAGEMENT NOTE    Name and Relationship of Patient/Support Person: Ashlee Marte - Self  Self    Patient Outreach    New bilateral breast cancer patient of Dr. Youngblood. Mastectomy scheduled for 3/26/2024. Role of oncology HRCM explained to patient. Patient agreeable to service, RN-ACM will follow-up with patient when surgery completed.    Roslyn CRAWFORD  Ambulatory Case Management    3/8/2024, 16:35 EST

## 2024-03-11 ENCOUNTER — PRE-ADMISSION TESTING (OUTPATIENT)
Dept: PREADMISSION TESTING | Facility: HOSPITAL | Age: 49
End: 2024-03-11
Payer: COMMERCIAL

## 2024-03-11 VITALS — BODY MASS INDEX: 34.8 KG/M2 | WEIGHT: 208.89 LBS | HEIGHT: 65 IN

## 2024-03-11 LAB
ALBUMIN SERPL-MCNC: 4.2 G/DL (ref 3.5–5.2)
ALBUMIN/GLOB SERPL: 1.2 G/DL
ALP SERPL-CCNC: 96 U/L (ref 39–117)
ALT SERPL W P-5'-P-CCNC: 12 U/L (ref 1–33)
ANION GAP SERPL CALCULATED.3IONS-SCNC: 8 MMOL/L (ref 5–15)
AST SERPL-CCNC: 16 U/L (ref 1–32)
BILIRUB SERPL-MCNC: 0.4 MG/DL (ref 0–1.2)
BUN SERPL-MCNC: 19 MG/DL (ref 6–20)
BUN/CREAT SERPL: 21.1 (ref 7–25)
CALCIUM SPEC-SCNC: 9.3 MG/DL (ref 8.6–10.5)
CHLORIDE SERPL-SCNC: 99 MMOL/L (ref 98–107)
CO2 SERPL-SCNC: 29 MMOL/L (ref 22–29)
CREAT SERPL-MCNC: 0.9 MG/DL (ref 0.57–1)
DEPRECATED RDW RBC AUTO: 46.5 FL (ref 37–54)
EGFRCR SERPLBLD CKD-EPI 2021: 78 ML/MIN/1.73
ERYTHROCYTE [DISTWIDTH] IN BLOOD BY AUTOMATED COUNT: 15.5 % (ref 12.3–15.4)
GLOBULIN UR ELPH-MCNC: 3.4 GM/DL
GLUCOSE SERPL-MCNC: 90 MG/DL (ref 65–99)
HCT VFR BLD AUTO: 41.7 % (ref 34–46.6)
HGB BLD-MCNC: 13 G/DL (ref 12–15.9)
MCH RBC QN AUTO: 25.9 PG (ref 26.6–33)
MCHC RBC AUTO-ENTMCNC: 31.2 G/DL (ref 31.5–35.7)
MCV RBC AUTO: 83.1 FL (ref 79–97)
PLATELET # BLD AUTO: 374 10*3/MM3 (ref 140–450)
PMV BLD AUTO: 9.2 FL (ref 6–12)
POTASSIUM SERPL-SCNC: 4.3 MMOL/L (ref 3.5–5.2)
PROT SERPL-MCNC: 7.6 G/DL (ref 6–8.5)
RBC # BLD AUTO: 5.02 10*6/MM3 (ref 3.77–5.28)
SODIUM SERPL-SCNC: 136 MMOL/L (ref 136–145)
WBC NRBC COR # BLD AUTO: 7.38 10*3/MM3 (ref 3.4–10.8)

## 2024-03-11 PROCEDURE — 36415 COLL VENOUS BLD VENIPUNCTURE: CPT

## 2024-03-11 PROCEDURE — 93005 ELECTROCARDIOGRAM TRACING: CPT

## 2024-03-11 PROCEDURE — 85027 COMPLETE CBC AUTOMATED: CPT

## 2024-03-11 PROCEDURE — 80053 COMPREHEN METABOLIC PANEL: CPT

## 2024-03-11 NOTE — PAT
An arrival time for procedure was not provided during PAT visit. If patient had any questions or concerns about their arrival time, they were instructed to contact their surgeon/physician.  Additionally, if the patient referred to an arrival time that was acquired from their my chart account, patient was encouraged to verify that time with their surgeon/physician. Arrival times are NOT provided in Pre Admission Testing Department.    Patient instructed to drink 20 ounces of Gatorade or Gatorlyte (if diabetic) and it needs to be completed 1 hour (for Main OR patients) or 2 hours (scheduled  section & [facility] patients) before given arrival time for procedure (NO RED Gatorade and NO Gatorade Zero).    Patient verbalized understanding.    Patient to apply Chlorhexadine wipes  to surgical area (as instructed) the night before procedure and the AM of procedure. Wipes provided.    Patient viewed general PAT education video as instructed in their preoperative information received from their surgeon.  Patient stated the general PAT education video was viewed in its entirety and survey completed.  Copies of PAT general education handouts (Incentive Spirometry, Meds to Beds Program, Patient Belongings, Pre-op skin preparation instructions, Blood Glucose testing, Visitor policy, Surgery FAQ, Code H) distributed to patient if not printed. Education related to the PAT pass and skin preparation for surgery (if applicable) completed in PAT as a reinforcement to PAT education video. Patient instructed to return PAT pass provided today as well as completed skin preparation sheet (if applicable) on the day of procedure.     Additionally if patient had not viewed video yet but intended to view it at home or in our waiting area, then referred them to the handout with QR code/link provided during PAT visit.  Instructed patient to complete survey after viewing the video in its entirety.  Encouraged patient/family to read PAT  general education handouts thoroughly and notify PAT staff with any questions or concerns. Patient verbalized understanding of all information and priority content.    Surgery scheduler, Michell, informed of current infection and abx.  She stated she will discuss with Dr. QUAN and call patient if needed.

## 2024-03-13 LAB
QT INTERVAL: 396 MS
QTC INTERVAL: 471 MS

## 2024-03-14 ENCOUNTER — OFFICE VISIT (OUTPATIENT)
Dept: FAMILY MEDICINE CLINIC | Facility: CLINIC | Age: 49
End: 2024-03-14
Payer: COMMERCIAL

## 2024-03-14 VITALS
HEART RATE: 96 BPM | WEIGHT: 207 LBS | SYSTOLIC BLOOD PRESSURE: 160 MMHG | OXYGEN SATURATION: 97 % | BODY MASS INDEX: 34.49 KG/M2 | HEIGHT: 65 IN | DIASTOLIC BLOOD PRESSURE: 110 MMHG

## 2024-03-14 DIAGNOSIS — R03.0 ELEVATED BLOOD PRESSURE READING WITHOUT DIAGNOSIS OF HYPERTENSION: Primary | ICD-10-CM

## 2024-03-14 PROCEDURE — 99213 OFFICE O/P EST LOW 20 MIN: CPT | Performed by: FAMILY MEDICINE

## 2024-03-14 RX ORDER — METOPROLOL SUCCINATE 25 MG/1
25 TABLET, EXTENDED RELEASE ORAL DAILY
Qty: 30 TABLET | Refills: 2 | Status: SHIPPED | OUTPATIENT
Start: 2024-03-14

## 2024-03-14 RX ORDER — CLONIDINE HYDROCHLORIDE 0.1 MG/1
0.1 TABLET ORAL 2 TIMES DAILY
Qty: 60 TABLET | Refills: 0 | Status: SHIPPED | OUTPATIENT
Start: 2024-03-14

## 2024-03-14 NOTE — PROGRESS NOTES
Follow Up Office Visit      Date of Visit:  2024   Patient Name: Ashlee Marte  : 1975   MRN: [id number]     Chief Complaint:    Chief Complaint   Patient presents with    Hypertension       History of Present Illness: Ashlee Marte is a 48 y.o. female who is here today for follow up.  Patient seen today for evaluation of her blood pressure.  Noticed earlier today some blurring in the periphery of her vision on the right side.  Blood pressure check was really fairly elevated.  Looks like it has been that way for the past few weeks at least on some readings at other physicians.  More stressful situation lately with recent diagnosis with breast cancer for which she is doing an evaluation and treatment.  Probably major contributing factor to the blood pressure being elevated.  Prior readings before were not that elevated.        Subjective      Review of Systems:   Review of Systems   Constitutional:  Negative for fatigue and fever.   HENT:  Negative for congestion and ear pain.    Respiratory:  Negative for apnea, cough, chest tightness and shortness of breath.    Cardiovascular:  Negative for chest pain.   Gastrointestinal:  Negative for abdominal pain, constipation, diarrhea and nausea.   Musculoskeletal:  Negative for arthralgias.   Psychiatric/Behavioral:  Negative for depressed mood and stress.        Past Medical History:   Past Medical History:   Diagnosis Date    Asthma     Breast cancer     COPD (chronic obstructive pulmonary disease)     Depression     history       Past Surgical History:   Past Surgical History:   Procedure Laterality Date    ENDOSCOPY      EGD x2    GASTRECTOMY      SLEEVE       Family History:   Family History   Problem Relation Age of Onset    Diabetes Other     COPD Other        Social History:   Social History     Socioeconomic History    Marital status: Single   Tobacco Use    Smoking status: Every Day     Current packs/day: 0.00     Average packs/day: 0.5  packs/day for 33.8 years (16.9 ttl pk-yrs)     Types: Cigarettes     Start date: 1990     Last attempt to quit: 2023     Years since quittin.3    Smokeless tobacco: Never   Vaping Use    Vaping status: Never Used   Substance and Sexual Activity    Alcohol use: Yes    Drug use: Not Currently    Sexual activity: Yes     Partners: Male       Medications:     Current Outpatient Medications:     albuterol sulfate HFA (Ventolin HFA) 108 (90 Base) MCG/ACT inhaler, Inhale 2 puffs Every 4 (Four) Hours As Needed for wheezing, Disp: 18 g, Rfl: 11    albuterol sulfate  (90 Base) MCG/ACT inhaler, Inhale 2 puffs Every 4 (Four) Hours As Needed for Wheezing., Disp: 18 g, Rfl: 11    budesonide-formoterol (Symbicort) 80-4.5 MCG/ACT inhaler, Inhale 2 puffs 2 (Two) Times a Day., Disp: 10.2 g, Rfl: 12    budesonide-formoterol (Symbicort) 80-4.5 MCG/ACT inhaler, Inhale 2 puffs 2 (Two) Times a Day., Disp: 10.2 g, Rfl: 12    FLUoxetine (PROzac) 40 MG capsule, TAKE ONE CAPSULE BY MOUTH DAILY, Disp: 90 capsule, Rfl: 3    FLUoxetine (PROzac) 40 MG capsule, Take 1 capsule by mouth Daily., Disp: 90 capsule, Rfl: 3    folic acid (FOLVITE) 1 MG tablet, Take 1 tablet by mouth Daily., Disp: 90 tablet, Rfl: 1    folic acid (FOLVITE) 1 MG tablet, Take 1 tablet by mouth Daily., Disp: 90 tablet, Rfl: 1    LORazepam (Ativan) 1 MG tablet, Take 1 tablet by mouth Every 8 (Eight) Hours As Needed for Anxiety., Disp: 30 tablet, Rfl: 0    metoprolol succinate XL (Toprol XL) 25 MG 24 hr tablet, Take 1 tablet by mouth Daily., Disp: 30 tablet, Rfl: 2    metroNIDAZOLE (Flagyl) 500 MG tablet, Take 1 tablet by mouth 2 (Two) Times a Day., Disp: 14 tablet, Rfl: 0    omeprazole (priLOSEC) 40 MG capsule, Take 1 capsule by mouth 2 (Two) Times a Day., Disp: 180 capsule, Rfl: 3    omeprazole (priLOSEC) 40 MG capsule, Take 1 capsule by mouth 2 (Two) Times a Day., Disp: 180 capsule, Rfl: 3    vitamin D (ERGOCALCIFEROL) 1.25 MG (62576 UT) capsule capsule,  "Take 1 capsule by mouth 1 (One) Time Per Week. (Patient taking differently: Take 1 capsule by mouth 1 (One) Time Per Week. Every Monday), Disp: 12 capsule, Rfl: 1    vitamin D (ERGOCALCIFEROL) 1.25 MG (41200 UT) capsule capsule, Take 1 capsule by mouth 1 (One) Time Per Week., Disp: 12 capsule, Rfl: 1    Allergies:   No Known Allergies    Objective     Physical Exam:  Vital Signs:   Vitals:    03/14/24 0935   BP: (!) 160/110   Pulse: 96   SpO2: 97%   Weight: 93.9 kg (207 lb)   Height: 163.8 cm (64.5\")     Body mass index is 34.98 kg/m².     Physical Exam  Vitals and nursing note reviewed.   Constitutional:       General: She is not in acute distress.     Appearance: Normal appearance. She is not ill-appearing.   HENT:      Head: Normocephalic and atraumatic.      Right Ear: Tympanic membrane and ear canal normal.      Left Ear: Tympanic membrane and ear canal normal.      Nose: Nose normal.   Cardiovascular:      Rate and Rhythm: Normal rate and regular rhythm.      Heart sounds: Normal heart sounds.   Pulmonary:      Effort: Pulmonary effort is normal.      Breath sounds: Normal breath sounds.   Neurological:      Mental Status: She is alert and oriented to person, place, and time. Mental status is at baseline.   Psychiatric:         Mood and Affect: Mood normal.         Procedures      Assessment / Plan      Assessment/Plan:   Diagnoses and all orders for this visit:    1. Elevated blood pressure reading without diagnosis of hypertension (Primary)    Other orders  -     metoprolol succinate XL (Toprol XL) 25 MG 24 hr tablet; Take 1 tablet by mouth Daily.  Dispense: 30 tablet; Refill: 2         I feel that for now we probably do need to give her something for the blood pressure since she has had the symptoms.  This may not be long-lived.  As stress subside some blood pressure may go down.  Gave metoprolol 25 mg.  Take the extended release version once daily.  She will forward us a few readings over the next 2 weeks " and she will let us know if she has any further symptoms.  Reviewed recent blood work which was stable.    Follow Up:   No follow-ups on file.    Tomy Landry  [facility]

## 2024-03-20 ENCOUNTER — ANESTHESIA EVENT (OUTPATIENT)
Dept: PERIOP | Facility: HOSPITAL | Age: 49
End: 2024-03-20
Payer: COMMERCIAL

## 2024-03-20 RX ORDER — SODIUM CHLORIDE 0.9 % (FLUSH) 0.9 %
10 SYRINGE (ML) INJECTION EVERY 12 HOURS SCHEDULED
Status: CANCELLED | OUTPATIENT
Start: 2024-03-20

## 2024-03-21 ENCOUNTER — HOSPITAL ENCOUNTER (OUTPATIENT)
Facility: HOSPITAL | Age: 49
Discharge: HOME | End: 2024-03-23
Attending: SURGERY | Admitting: SURGERY
Payer: COMMERCIAL

## 2024-03-21 ENCOUNTER — ANESTHESIA EVENT CONVERTED (OUTPATIENT)
Dept: ANESTHESIOLOGY | Facility: HOSPITAL | Age: 49
End: 2024-03-21
Payer: COMMERCIAL

## 2024-03-21 ENCOUNTER — ANESTHESIA (OUTPATIENT)
Dept: PERIOP | Facility: HOSPITAL | Age: 49
End: 2024-03-21
Payer: COMMERCIAL

## 2024-03-21 ENCOUNTER — ANESTHESIA EVENT (OUTPATIENT)
Dept: PERIOP | Facility: HOSPITAL | Age: 49
End: 2024-03-21
Payer: COMMERCIAL

## 2024-03-21 ENCOUNTER — HOSPITAL ENCOUNTER (OUTPATIENT)
Dept: NUCLEAR MEDICINE | Facility: HOSPITAL | Age: 49
Discharge: HOME | End: 2024-03-21
Payer: COMMERCIAL

## 2024-03-21 DIAGNOSIS — C50.412 MALIGNANT NEOPLASM OF UPPER-OUTER QUADRANT OF LEFT FEMALE BREAST, UNSPECIFIED ESTROGEN RECEPTOR STATUS: ICD-10-CM

## 2024-03-21 DIAGNOSIS — C50.412 MALIGNANT NEOPLASM OF UPPER-OUTER QUADRANT OF LEFT FEMALE BREAST: ICD-10-CM

## 2024-03-21 DIAGNOSIS — C50.811 MALIGNANT NEOPLASM OF OVERLAPPING SITES OF RIGHT FEMALE BREAST: Primary | ICD-10-CM

## 2024-03-21 DIAGNOSIS — C50.811 MALIGNANT NEOPLASM OF OVERLAPPING SITES OF RIGHT FEMALE BREAST, UNSPECIFIED ESTROGEN RECEPTOR STATUS: ICD-10-CM

## 2024-03-21 LAB
ABO GROUP BLD: NORMAL
ANION GAP SERPL CALCULATED.3IONS-SCNC: 12 MMOL/L (ref 5–15)
B-HCG UR QL: NEGATIVE
BLD GP AB SCN SERPL QL: NEGATIVE
BUN SERPL-MCNC: 12 MG/DL (ref 6–20)
BUN/CREAT SERPL: 11.9 (ref 7–25)
CALCIUM SPEC-SCNC: 7.8 MG/DL (ref 8.6–10.5)
CHLORIDE SERPL-SCNC: 101 MMOL/L (ref 98–107)
CO2 SERPL-SCNC: 23 MMOL/L (ref 22–29)
CREAT SERPL-MCNC: 1.01 MG/DL (ref 0.57–1)
DEPRECATED RDW RBC AUTO: 49.7 FL (ref 37–54)
EGFRCR SERPLBLD CKD-EPI 2021: 68 ML/MIN/1.73
ERYTHROCYTE [DISTWIDTH] IN BLOOD BY AUTOMATED COUNT: 15.4 % (ref 12.3–15.4)
EXPIRATION DATE: NORMAL
GLUCOSE BLDC GLUCOMTR-MCNC: 95 MG/DL (ref 70–130)
GLUCOSE SERPL-MCNC: 199 MG/DL (ref 65–99)
HCT VFR BLD AUTO: 31.3 % (ref 34–46.6)
HGB BLD-MCNC: 9.5 G/DL (ref 12–15.9)
INTERNAL NEGATIVE CONTROL: NORMAL
INTERNAL POSITIVE CONTROL: NORMAL
LAB AP CASE REPORT: NORMAL
Lab: NORMAL
Lab: NORMAL
MCH RBC QN AUTO: 26.5 PG (ref 26.6–33)
MCHC RBC AUTO-ENTMCNC: 30.4 G/DL (ref 31.5–35.7)
MCV RBC AUTO: 87.2 FL (ref 79–97)
PLATELET # BLD AUTO: 405 10*3/MM3 (ref 140–450)
PMV BLD AUTO: 9.7 FL (ref 6–12)
POTASSIUM SERPL-SCNC: 5.2 MMOL/L (ref 3.5–5.2)
RBC # BLD AUTO: 3.59 10*6/MM3 (ref 3.77–5.28)
RH BLD: POSITIVE
SODIUM SERPL-SCNC: 136 MMOL/L (ref 136–145)
T&S EXPIRATION DATE: NORMAL
WBC NRBC COR # BLD AUTO: 18.03 10*3/MM3 (ref 3.4–10.8)

## 2024-03-21 PROCEDURE — 88307 TISSUE EXAM BY PATHOLOGIST: CPT | Performed by: SURGERY

## 2024-03-21 PROCEDURE — 25010000002 DEXAMETHASONE PER 1 MG: Performed by: ANESTHESIOLOGY

## 2024-03-21 PROCEDURE — 38792 RA TRACER ID OF SENTINL NODE: CPT

## 2024-03-21 PROCEDURE — 25810000003 SODIUM CHLORIDE 0.9 % SOLUTION: Performed by: SURGERY

## 2024-03-21 PROCEDURE — 25010000002 ONDANSETRON PER 1 MG: Performed by: ANESTHESIOLOGY

## 2024-03-21 PROCEDURE — 25010000002 HYDROMORPHONE 1 MG/ML SOLUTION: Performed by: SURGERY

## 2024-03-21 PROCEDURE — 25010000002 FENTANYL CITRATE (PF) 100 MCG/2ML SOLUTION: Performed by: ANESTHESIOLOGY

## 2024-03-21 PROCEDURE — 25010000002 CEFAZOLIN PER 500 MG: Performed by: SURGERY

## 2024-03-21 PROCEDURE — 88332 PATH CONSLTJ SURG EA ADD BLK: CPT | Performed by: PATHOLOGY

## 2024-03-21 PROCEDURE — 25010000002 PROPOFOL 10 MG/ML EMULSION: Performed by: ANESTHESIOLOGY

## 2024-03-21 PROCEDURE — 86900 BLOOD TYPING SEROLOGIC ABO: CPT | Performed by: SURGERY

## 2024-03-21 PROCEDURE — 86923 COMPATIBILITY TEST ELECTRIC: CPT

## 2024-03-21 PROCEDURE — 81025 URINE PREGNANCY TEST: CPT | Performed by: ANESTHESIOLOGY

## 2024-03-21 PROCEDURE — 25010000002 SUGAMMADEX 200 MG/2ML SOLUTION: Performed by: ANESTHESIOLOGY

## 2024-03-21 PROCEDURE — 80048 BASIC METABOLIC PNL TOTAL CA: CPT | Performed by: SURGERY

## 2024-03-21 PROCEDURE — 86850 RBC ANTIBODY SCREEN: CPT | Performed by: SURGERY

## 2024-03-21 PROCEDURE — 25810000003 LACTATED RINGERS PER 1000 ML: Performed by: ANESTHESIOLOGY

## 2024-03-21 PROCEDURE — 82948 REAGENT STRIP/BLOOD GLUCOSE: CPT

## 2024-03-21 PROCEDURE — 85027 COMPLETE CBC AUTOMATED: CPT | Performed by: SURGERY

## 2024-03-21 PROCEDURE — 0 TECHNETIUM FILTERED SULFUR COLLOID: Performed by: SURGERY

## 2024-03-21 PROCEDURE — 25010000002 BUPIVACAINE (PF) 0.25 % SOLUTION: Performed by: ANESTHESIOLOGY

## 2024-03-21 PROCEDURE — A9541 TC99M SULFUR COLLOID: HCPCS | Performed by: SURGERY

## 2024-03-21 PROCEDURE — 86901 BLOOD TYPING SEROLOGIC RH(D): CPT | Performed by: SURGERY

## 2024-03-21 PROCEDURE — 25010000002 DEXAMETHASONE SODIUM PHOSPHATE 10 MG/ML SOLUTION: Performed by: ANESTHESIOLOGY

## 2024-03-21 DEVICE — FLOSEAL HEMOSTATIC MATRIX, 10ML
Type: IMPLANTABLE DEVICE | Site: BREAST | Status: FUNCTIONAL
Brand: FLOSEAL HEMOSTATIC MATRIX

## 2024-03-21 RX ORDER — FENTANYL CITRATE 50 UG/ML
INJECTION, SOLUTION INTRAMUSCULAR; INTRAVENOUS AS NEEDED
Status: DISCONTINUED | OUTPATIENT
Start: 2024-03-21 | End: 2024-03-21 | Stop reason: SURG

## 2024-03-21 RX ORDER — SODIUM CHLORIDE 0.9 % (FLUSH) 0.9 %
3-10 SYRINGE (ML) INJECTION AS NEEDED
Status: DISCONTINUED | OUTPATIENT
Start: 2024-03-21 | End: 2024-03-21 | Stop reason: HOSPADM

## 2024-03-21 RX ORDER — DEXAMETHASONE SODIUM PHOSPHATE 10 MG/ML
INJECTION, SOLUTION INTRAMUSCULAR; INTRAVENOUS AS NEEDED
Status: DISCONTINUED | OUTPATIENT
Start: 2024-03-21 | End: 2024-03-21 | Stop reason: SURG

## 2024-03-21 RX ORDER — MELOXICAM 7.5 MG/1
15 TABLET ORAL DAILY
Status: DISCONTINUED | OUTPATIENT
Start: 2024-03-22 | End: 2024-03-23 | Stop reason: HOSPADM

## 2024-03-21 RX ORDER — DEXAMETHASONE SODIUM PHOSPHATE 4 MG/ML
INJECTION, SOLUTION INTRA-ARTICULAR; INTRALESIONAL; INTRAMUSCULAR; INTRAVENOUS; SOFT TISSUE AS NEEDED
Status: DISCONTINUED | OUTPATIENT
Start: 2024-03-21 | End: 2024-03-21 | Stop reason: SURG

## 2024-03-21 RX ORDER — PROMETHAZINE HYDROCHLORIDE 25 MG/1
25 SUPPOSITORY RECTAL ONCE AS NEEDED
Status: DISCONTINUED | OUTPATIENT
Start: 2024-03-21 | End: 2024-03-21 | Stop reason: HOSPADM

## 2024-03-21 RX ORDER — PANTOPRAZOLE SODIUM 40 MG/1
40 TABLET, DELAYED RELEASE ORAL
Status: DISCONTINUED | OUTPATIENT
Start: 2024-03-21 | End: 2024-03-21

## 2024-03-21 RX ORDER — CLONIDINE HYDROCHLORIDE 0.1 MG/1
0.1 TABLET ORAL 2 TIMES DAILY
Status: DISCONTINUED | OUTPATIENT
Start: 2024-03-21 | End: 2024-03-21

## 2024-03-21 RX ORDER — MIDAZOLAM HYDROCHLORIDE 1 MG/ML
1 INJECTION INTRAMUSCULAR; INTRAVENOUS
Status: DISCONTINUED | OUTPATIENT
Start: 2024-03-21 | End: 2024-03-21 | Stop reason: HOSPADM

## 2024-03-21 RX ORDER — NALOXONE HCL 0.4 MG/ML
0.4 VIAL (ML) INJECTION AS NEEDED
Status: DISCONTINUED | OUTPATIENT
Start: 2024-03-21 | End: 2024-03-21 | Stop reason: HOSPADM

## 2024-03-21 RX ORDER — LIDOCAINE HYDROCHLORIDE 10 MG/ML
0.5 INJECTION, SOLUTION EPIDURAL; INFILTRATION; INTRACAUDAL; PERINEURAL ONCE AS NEEDED
Status: COMPLETED | OUTPATIENT
Start: 2024-03-21 | End: 2024-03-21

## 2024-03-21 RX ORDER — ONDANSETRON 2 MG/ML
4 INJECTION INTRAMUSCULAR; INTRAVENOUS ONCE AS NEEDED
Status: DISCONTINUED | OUTPATIENT
Start: 2024-03-21 | End: 2024-03-21 | Stop reason: HOSPADM

## 2024-03-21 RX ORDER — DIPHENHYDRAMINE HYDROCHLORIDE 50 MG/ML
12.5 INJECTION INTRAMUSCULAR; INTRAVENOUS EVERY 6 HOURS PRN
Status: DISCONTINUED | OUTPATIENT
Start: 2024-03-21 | End: 2024-03-23 | Stop reason: HOSPADM

## 2024-03-21 RX ORDER — MELOXICAM 15 MG/1
15 TABLET ORAL ONCE
Status: COMPLETED | OUTPATIENT
Start: 2024-03-21 | End: 2024-03-21

## 2024-03-21 RX ORDER — BUPIVACAINE HCL/0.9 % NACL/PF 0.125 %
PLASTIC BAG, INJECTION (ML) EPIDURAL AS NEEDED
Status: DISCONTINUED | OUTPATIENT
Start: 2024-03-21 | End: 2024-03-21 | Stop reason: SURG

## 2024-03-21 RX ORDER — ACETAMINOPHEN 500 MG
1000 TABLET ORAL ONCE
Status: COMPLETED | OUTPATIENT
Start: 2024-03-21 | End: 2024-03-21

## 2024-03-21 RX ORDER — FAMOTIDINE 20 MG/1
20 TABLET, FILM COATED ORAL ONCE
Status: COMPLETED | OUTPATIENT
Start: 2024-03-21 | End: 2024-03-21

## 2024-03-21 RX ORDER — LABETALOL HYDROCHLORIDE 5 MG/ML
5 INJECTION, SOLUTION INTRAVENOUS
Status: DISCONTINUED | OUTPATIENT
Start: 2024-03-21 | End: 2024-03-21 | Stop reason: HOSPADM

## 2024-03-21 RX ORDER — EPHEDRINE SULFATE 50 MG/ML
INJECTION, SOLUTION INTRAVENOUS AS NEEDED
Status: DISCONTINUED | OUTPATIENT
Start: 2024-03-21 | End: 2024-03-21 | Stop reason: SURG

## 2024-03-21 RX ORDER — SODIUM CHLORIDE 9 MG/ML
100 INJECTION, SOLUTION INTRAVENOUS CONTINUOUS
Status: DISCONTINUED | OUTPATIENT
Start: 2024-03-21 | End: 2024-03-23

## 2024-03-21 RX ORDER — LIDOCAINE HYDROCHLORIDE 10 MG/ML
INJECTION, SOLUTION EPIDURAL; INFILTRATION; INTRACAUDAL; PERINEURAL AS NEEDED
Status: DISCONTINUED | OUTPATIENT
Start: 2024-03-21 | End: 2024-03-21 | Stop reason: SURG

## 2024-03-21 RX ORDER — BUPIVACAINE HYDROCHLORIDE 2.5 MG/ML
INJECTION, SOLUTION EPIDURAL; INFILTRATION; INTRACAUDAL AS NEEDED
Status: DISCONTINUED | OUTPATIENT
Start: 2024-03-21 | End: 2024-03-21 | Stop reason: SURG

## 2024-03-21 RX ORDER — ONDANSETRON 2 MG/ML
4 INJECTION INTRAMUSCULAR; INTRAVENOUS EVERY 6 HOURS PRN
Status: DISCONTINUED | OUTPATIENT
Start: 2024-03-21 | End: 2024-03-23 | Stop reason: HOSPADM

## 2024-03-21 RX ORDER — FLUOXETINE HYDROCHLORIDE 20 MG/1
40 CAPSULE ORAL DAILY
Status: DISCONTINUED | OUTPATIENT
Start: 2024-03-21 | End: 2024-03-21

## 2024-03-21 RX ORDER — SODIUM CHLORIDE 9 MG/ML
40 INJECTION, SOLUTION INTRAVENOUS AS NEEDED
Status: DISCONTINUED | OUTPATIENT
Start: 2024-03-21 | End: 2024-03-21 | Stop reason: HOSPADM

## 2024-03-21 RX ORDER — DROPERIDOL 2.5 MG/ML
0.62 INJECTION, SOLUTION INTRAMUSCULAR; INTRAVENOUS ONCE AS NEEDED
Status: DISCONTINUED | OUTPATIENT
Start: 2024-03-21 | End: 2024-03-21 | Stop reason: HOSPADM

## 2024-03-21 RX ORDER — LORAZEPAM 0.5 MG/1
0.5 TABLET ORAL EVERY 8 HOURS PRN
Status: DISCONTINUED | OUTPATIENT
Start: 2024-03-21 | End: 2024-03-23 | Stop reason: HOSPADM

## 2024-03-21 RX ORDER — HYDRALAZINE HYDROCHLORIDE 20 MG/ML
5 INJECTION INTRAMUSCULAR; INTRAVENOUS
Status: DISCONTINUED | OUTPATIENT
Start: 2024-03-21 | End: 2024-03-21 | Stop reason: HOSPADM

## 2024-03-21 RX ORDER — ALBUTEROL SULFATE 90 UG/1
2 AEROSOL, METERED RESPIRATORY (INHALATION) EVERY 4 HOURS PRN
Status: DISCONTINUED | OUTPATIENT
Start: 2024-03-21 | End: 2024-03-21

## 2024-03-21 RX ORDER — DIAZEPAM 2 MG/1
2 TABLET ORAL EVERY 8 HOURS PRN
Status: DISCONTINUED | OUTPATIENT
Start: 2024-03-21 | End: 2024-03-23 | Stop reason: HOSPADM

## 2024-03-21 RX ORDER — METOPROLOL SUCCINATE 25 MG/1
25 TABLET, EXTENDED RELEASE ORAL DAILY
Status: DISCONTINUED | OUTPATIENT
Start: 2024-03-21 | End: 2024-03-23 | Stop reason: HOSPADM

## 2024-03-21 RX ORDER — SCOLOPAMINE TRANSDERMAL SYSTEM 1 MG/1
1 PATCH, EXTENDED RELEASE TRANSDERMAL CONTINUOUS
Status: DISCONTINUED | OUTPATIENT
Start: 2024-03-21 | End: 2024-03-23 | Stop reason: HOSPADM

## 2024-03-21 RX ORDER — HYDROMORPHONE HYDROCHLORIDE 1 MG/ML
0.5 INJECTION, SOLUTION INTRAMUSCULAR; INTRAVENOUS; SUBCUTANEOUS
Status: DISCONTINUED | OUTPATIENT
Start: 2024-03-21 | End: 2024-03-21 | Stop reason: HOSPADM

## 2024-03-21 RX ORDER — PREGABALIN 75 MG/1
75 CAPSULE ORAL ONCE
Status: COMPLETED | OUTPATIENT
Start: 2024-03-21 | End: 2024-03-21

## 2024-03-21 RX ORDER — CLONIDINE HYDROCHLORIDE 0.1 MG/1
0.1 TABLET ORAL 2 TIMES DAILY
Status: DISCONTINUED | OUTPATIENT
Start: 2024-03-21 | End: 2024-03-23 | Stop reason: HOSPADM

## 2024-03-21 RX ORDER — SODIUM CHLORIDE, SODIUM LACTATE, POTASSIUM CHLORIDE, CALCIUM CHLORIDE 600; 310; 30; 20 MG/100ML; MG/100ML; MG/100ML; MG/100ML
INJECTION, SOLUTION INTRAVENOUS CONTINUOUS PRN
Status: DISCONTINUED | OUTPATIENT
Start: 2024-03-21 | End: 2024-03-21 | Stop reason: SURG

## 2024-03-21 RX ORDER — ENALAPRILAT 1.25 MG/ML
1.25 INJECTION INTRAVENOUS EVERY 6 HOURS PRN
Status: DISCONTINUED | OUTPATIENT
Start: 2024-03-21 | End: 2024-03-23 | Stop reason: HOSPADM

## 2024-03-21 RX ORDER — PROPOFOL 10 MG/ML
VIAL (ML) INTRAVENOUS AS NEEDED
Status: DISCONTINUED | OUTPATIENT
Start: 2024-03-21 | End: 2024-03-21 | Stop reason: SURG

## 2024-03-21 RX ORDER — MEPERIDINE HYDROCHLORIDE 25 MG/ML
12.5 INJECTION INTRAMUSCULAR; INTRAVENOUS; SUBCUTANEOUS
Status: DISCONTINUED | OUTPATIENT
Start: 2024-03-21 | End: 2024-03-21 | Stop reason: HOSPADM

## 2024-03-21 RX ORDER — OXYCODONE HYDROCHLORIDE 5 MG/1
5 TABLET ORAL EVERY 4 HOURS PRN
Status: DISCONTINUED | OUTPATIENT
Start: 2024-03-21 | End: 2024-03-23 | Stop reason: HOSPADM

## 2024-03-21 RX ORDER — METOPROLOL SUCCINATE 25 MG/1
25 TABLET, EXTENDED RELEASE ORAL DAILY
Status: DISCONTINUED | OUTPATIENT
Start: 2024-03-21 | End: 2024-03-21

## 2024-03-21 RX ORDER — MAGNESIUM HYDROXIDE 1200 MG/15ML
LIQUID ORAL AS NEEDED
Status: DISCONTINUED | OUTPATIENT
Start: 2024-03-21 | End: 2024-03-21 | Stop reason: HOSPADM

## 2024-03-21 RX ORDER — FLUOXETINE HYDROCHLORIDE 20 MG/1
40 CAPSULE ORAL DAILY
Status: DISCONTINUED | OUTPATIENT
Start: 2024-03-21 | End: 2024-03-23 | Stop reason: HOSPADM

## 2024-03-21 RX ORDER — SODIUM CHLORIDE 0.9 % (FLUSH) 0.9 %
3 SYRINGE (ML) INJECTION EVERY 12 HOURS SCHEDULED
Status: DISCONTINUED | OUTPATIENT
Start: 2024-03-21 | End: 2024-03-21 | Stop reason: HOSPADM

## 2024-03-21 RX ORDER — SODIUM CHLORIDE, SODIUM LACTATE, POTASSIUM CHLORIDE, CALCIUM CHLORIDE 600; 310; 30; 20 MG/100ML; MG/100ML; MG/100ML; MG/100ML
9 INJECTION, SOLUTION INTRAVENOUS CONTINUOUS
Status: DISCONTINUED | OUTPATIENT
Start: 2024-03-21 | End: 2024-03-23 | Stop reason: HOSPADM

## 2024-03-21 RX ORDER — PROMETHAZINE HYDROCHLORIDE 25 MG/1
25 TABLET ORAL ONCE AS NEEDED
Status: DISCONTINUED | OUTPATIENT
Start: 2024-03-21 | End: 2024-03-21 | Stop reason: HOSPADM

## 2024-03-21 RX ORDER — DROPERIDOL 2.5 MG/ML
0.62 INJECTION, SOLUTION INTRAMUSCULAR; INTRAVENOUS
Status: DISCONTINUED | OUTPATIENT
Start: 2024-03-21 | End: 2024-03-21 | Stop reason: HOSPADM

## 2024-03-21 RX ORDER — EPHEDRINE SULFATE 50 MG/ML
INJECTION INTRAVENOUS AS NEEDED
Status: DISCONTINUED | OUTPATIENT
Start: 2024-03-21 | End: 2024-03-21 | Stop reason: SURG

## 2024-03-21 RX ORDER — IPRATROPIUM BROMIDE AND ALBUTEROL SULFATE 2.5; .5 MG/3ML; MG/3ML
3 SOLUTION RESPIRATORY (INHALATION) ONCE AS NEEDED
Status: DISCONTINUED | OUTPATIENT
Start: 2024-03-21 | End: 2024-03-21 | Stop reason: HOSPADM

## 2024-03-21 RX ORDER — FENTANYL CITRATE 50 UG/ML
50 INJECTION, SOLUTION INTRAMUSCULAR; INTRAVENOUS
Status: DISCONTINUED | OUTPATIENT
Start: 2024-03-21 | End: 2024-03-21 | Stop reason: HOSPADM

## 2024-03-21 RX ORDER — ROCURONIUM BROMIDE 10 MG/ML
INJECTION, SOLUTION INTRAVENOUS AS NEEDED
Status: DISCONTINUED | OUTPATIENT
Start: 2024-03-21 | End: 2024-03-21 | Stop reason: SURG

## 2024-03-21 RX ORDER — PANTOPRAZOLE SODIUM 40 MG/1
40 TABLET, DELAYED RELEASE ORAL
Status: DISCONTINUED | OUTPATIENT
Start: 2024-03-21 | End: 2024-03-22

## 2024-03-21 RX ORDER — ACETAMINOPHEN 500 MG
1000 TABLET ORAL EVERY 6 HOURS
Status: DISPENSED | OUTPATIENT
Start: 2024-03-21 | End: 2024-03-23

## 2024-03-21 RX ORDER — PROMETHAZINE HYDROCHLORIDE 12.5 MG/1
6.25 SUPPOSITORY RECTAL EVERY 6 HOURS PRN
Status: DISCONTINUED | OUTPATIENT
Start: 2024-03-21 | End: 2024-03-23 | Stop reason: HOSPADM

## 2024-03-21 RX ORDER — ALBUTEROL SULFATE 90 UG/1
2 AEROSOL, METERED RESPIRATORY (INHALATION) EVERY 4 HOURS PRN
Status: DISCONTINUED | OUTPATIENT
Start: 2024-03-21 | End: 2024-03-23 | Stop reason: HOSPADM

## 2024-03-21 RX ORDER — PROMETHAZINE HYDROCHLORIDE 12.5 MG/1
6.25 TABLET ORAL EVERY 6 HOURS PRN
Status: DISCONTINUED | OUTPATIENT
Start: 2024-03-21 | End: 2024-03-23 | Stop reason: HOSPADM

## 2024-03-21 RX ORDER — ONDANSETRON 2 MG/ML
INJECTION INTRAMUSCULAR; INTRAVENOUS AS NEEDED
Status: DISCONTINUED | OUTPATIENT
Start: 2024-03-21 | End: 2024-03-21 | Stop reason: SURG

## 2024-03-21 RX ORDER — FAMOTIDINE 10 MG/ML
20 INJECTION, SOLUTION INTRAVENOUS ONCE
Status: DISCONTINUED | OUTPATIENT
Start: 2024-03-21 | End: 2024-03-21 | Stop reason: HOSPADM

## 2024-03-21 RX ORDER — ACETAMINOPHEN 500 MG
1000 TABLET ORAL EVERY 6 HOURS
Status: DISCONTINUED | OUTPATIENT
Start: 2024-03-21 | End: 2024-03-21

## 2024-03-21 RX ORDER — HYDROCODONE BITARTRATE AND ACETAMINOPHEN 5; 325 MG/1; MG/1
1 TABLET ORAL ONCE AS NEEDED
Status: DISCONTINUED | OUTPATIENT
Start: 2024-03-21 | End: 2024-03-21 | Stop reason: HOSPADM

## 2024-03-21 RX ORDER — NALOXONE HCL 0.4 MG/ML
0.1 VIAL (ML) INJECTION
Status: DISCONTINUED | OUTPATIENT
Start: 2024-03-21 | End: 2024-03-23 | Stop reason: HOSPADM

## 2024-03-21 RX ORDER — SODIUM CHLORIDE 0.9 % (FLUSH) 0.9 %
10 SYRINGE (ML) INJECTION AS NEEDED
Status: DISCONTINUED | OUTPATIENT
Start: 2024-03-21 | End: 2024-03-21 | Stop reason: HOSPADM

## 2024-03-21 RX ADMIN — ONDANSETRON 4 MG: 2 INJECTION INTRAMUSCULAR; INTRAVENOUS at 14:15

## 2024-03-21 RX ADMIN — FLUOXETINE 40 MG: 20 CAPSULE ORAL at 20:49

## 2024-03-21 RX ADMIN — FAMOTIDINE 20 MG: 20 TABLET, FILM COATED ORAL at 07:09

## 2024-03-21 RX ADMIN — SODIUM CHLORIDE, POTASSIUM CHLORIDE, SODIUM LACTATE AND CALCIUM CHLORIDE 9 ML/HR: 600; 310; 30; 20 INJECTION, SOLUTION INTRAVENOUS at 07:07

## 2024-03-21 RX ADMIN — HYDROMORPHONE HYDROCHLORIDE 0.3 MG: 1 INJECTION, SOLUTION INTRAMUSCULAR; INTRAVENOUS; SUBCUTANEOUS at 19:09

## 2024-03-21 RX ADMIN — ROCURONIUM BROMIDE 50 MG: 10 INJECTION INTRAVENOUS at 13:45

## 2024-03-21 RX ADMIN — TECHNETIUM TC 99M SULFUR COLLOID 1 DOSE: KIT at 07:59

## 2024-03-21 RX ADMIN — PROPOFOL 10 MG: 10 INJECTION, EMULSION INTRAVENOUS at 14:20

## 2024-03-21 RX ADMIN — SODIUM CHLORIDE, POTASSIUM CHLORIDE, SODIUM LACTATE AND CALCIUM CHLORIDE: 600; 310; 30; 20 INJECTION, SOLUTION INTRAVENOUS at 07:32

## 2024-03-21 RX ADMIN — Medication 100 MCG: at 14:04

## 2024-03-21 RX ADMIN — ROCURONIUM BROMIDE 50 MG: 10 INJECTION INTRAVENOUS at 07:32

## 2024-03-21 RX ADMIN — LIDOCAINE HYDROCHLORIDE 50 MG: 10 INJECTION, SOLUTION EPIDURAL; INFILTRATION; INTRACAUDAL; PERINEURAL at 07:32

## 2024-03-21 RX ADMIN — SODIUM CHLORIDE 100 ML/HR: 9 INJECTION, SOLUTION INTRAVENOUS at 19:31

## 2024-03-21 RX ADMIN — Medication 100 MCG: at 14:09

## 2024-03-21 RX ADMIN — EPHEDRINE SULFATE 5 MG: 50 INJECTION INTRAVENOUS at 14:13

## 2024-03-21 RX ADMIN — FENTANYL CITRATE 100 MCG: 50 INJECTION, SOLUTION INTRAMUSCULAR; INTRAVENOUS at 13:45

## 2024-03-21 RX ADMIN — LIDOCAINE HYDROCHLORIDE 50 MG: 10 INJECTION, SOLUTION EPIDURAL; INFILTRATION; INTRACAUDAL; PERINEURAL at 13:45

## 2024-03-21 RX ADMIN — FENTANYL CITRATE 100 MCG: 50 INJECTION, SOLUTION INTRAMUSCULAR; INTRAVENOUS at 07:32

## 2024-03-21 RX ADMIN — DEXAMETHASONE SODIUM PHOSPHATE 4 MG: 4 INJECTION INTRA-ARTICULAR; INTRALESIONAL; INTRAMUSCULAR; INTRAVENOUS; SOFT TISSUE at 13:48

## 2024-03-21 RX ADMIN — PROPOFOL 20 MG: 10 INJECTION, EMULSION INTRAVENOUS at 09:30

## 2024-03-21 RX ADMIN — BUPIVACAINE HYDROCHLORIDE 60 ML: 2.5 INJECTION, SOLUTION EPIDURAL; INFILTRATION; INTRACAUDAL; PERINEURAL at 07:33

## 2024-03-21 RX ADMIN — PANTOPRAZOLE SODIUM 40 MG: 40 TABLET, DELAYED RELEASE ORAL at 20:49

## 2024-03-21 RX ADMIN — ACETAMINOPHEN 1000 MG: 500 TABLET ORAL at 07:09

## 2024-03-21 RX ADMIN — METOPROLOL SUCCINATE 25 MG: 25 TABLET, EXTENDED RELEASE ORAL at 20:49

## 2024-03-21 RX ADMIN — DEXAMETHASONE SODIUM PHOSPHATE 4 MG: 4 INJECTION INTRA-ARTICULAR; INTRALESIONAL; INTRAMUSCULAR; INTRAVENOUS; SOFT TISSUE at 07:36

## 2024-03-21 RX ADMIN — ACETAMINOPHEN 1000 MG: 500 TABLET ORAL at 20:49

## 2024-03-21 RX ADMIN — SCOPOLAMINE 1 PATCH: 1.5 PATCH, EXTENDED RELEASE TRANSDERMAL at 07:08

## 2024-03-21 RX ADMIN — ONDANSETRON 4 MG: 2 INJECTION INTRAMUSCULAR; INTRAVENOUS at 09:30

## 2024-03-21 RX ADMIN — SUGAMMADEX 200 MG: 100 INJECTION, SOLUTION INTRAVENOUS at 09:30

## 2024-03-21 RX ADMIN — EPHEDRINE SULFATE 5 MG: 50 INJECTION INTRAVENOUS at 09:23

## 2024-03-21 RX ADMIN — PROPOFOL 20 MG: 10 INJECTION, EMULSION INTRAVENOUS at 09:35

## 2024-03-21 RX ADMIN — PROPOFOL 10 MG: 10 INJECTION, EMULSION INTRAVENOUS at 14:25

## 2024-03-21 RX ADMIN — ROCURONIUM BROMIDE 30 MG: 10 INJECTION INTRAVENOUS at 08:16

## 2024-03-21 RX ADMIN — PROPOFOL 20 MG: 10 INJECTION, EMULSION INTRAVENOUS at 14:15

## 2024-03-21 RX ADMIN — Medication 100 MCG: at 14:13

## 2024-03-21 RX ADMIN — Medication 100 MCG: at 14:07

## 2024-03-21 RX ADMIN — EPHEDRINE SULFATE 5 MG: 50 INJECTION INTRAVENOUS at 08:55

## 2024-03-21 RX ADMIN — DEXAMETHASONE SODIUM PHOSPHATE 4 MG: 10 INJECTION, SOLUTION INTRAMUSCULAR; INTRAVENOUS at 07:33

## 2024-03-21 RX ADMIN — PREGABALIN 75 MG: 75 CAPSULE ORAL at 07:09

## 2024-03-21 RX ADMIN — EPHEDRINE SULFATE 5 MG: 50 INJECTION INTRAVENOUS at 14:09

## 2024-03-21 RX ADMIN — SUGAMMADEX 400 MG: 100 INJECTION, SOLUTION INTRAVENOUS at 14:17

## 2024-03-21 RX ADMIN — PROPOFOL 150 MG: 10 INJECTION, EMULSION INTRAVENOUS at 07:32

## 2024-03-21 RX ADMIN — PROPOFOL 150 MG: 10 INJECTION, EMULSION INTRAVENOUS at 13:45

## 2024-03-21 RX ADMIN — SODIUM CHLORIDE 2000 MG: 900 INJECTION INTRAVENOUS at 13:49

## 2024-03-21 RX ADMIN — LIDOCAINE HYDROCHLORIDE 0.5 ML: 10 INJECTION, SOLUTION EPIDURAL; INFILTRATION; INTRACAUDAL; PERINEURAL at 07:08

## 2024-03-21 RX ADMIN — SODIUM CHLORIDE, POTASSIUM CHLORIDE, SODIUM LACTATE AND CALCIUM CHLORIDE: 600; 310; 30; 20 INJECTION, SOLUTION INTRAVENOUS at 13:45

## 2024-03-21 RX ADMIN — SODIUM CHLORIDE 2000 MG: 900 INJECTION INTRAVENOUS at 07:36

## 2024-03-21 RX ADMIN — MELOXICAM 15 MG: 15 TABLET ORAL at 07:09

## 2024-03-21 RX ADMIN — PROPOFOL 25 MCG/KG/MIN: 10 INJECTION, EMULSION INTRAVENOUS at 07:36

## 2024-03-21 RX ADMIN — CLONIDINE HYDROCHLORIDE 0.1 MG: 0.1 TABLET ORAL at 20:49

## 2024-03-21 NOTE — OP NOTE
Operative Note    Ashlee Marte  [id number]   1975     Date of Surgery:  3/21/2024    Pre-Operative Diagnosis: Right breast cancer at 12 o'clock position                                              Left breast cancer in the upper outer quadrant    Post-Operative Diagnosis: Same    Procedure: Bilateral sentinel lymph node injection                      Bilateral skin sparing mastectomies                      Bilateral deep subfascial sentinel lymph node biopsies    Anesthesia:  General with Block     [place] Node Biopsy for Breast Cancer - Right  Operation performed with curative intent. Yes   Tracer(s) used to identify sentinel nodes in the upfront surgery (non-neoadjuvant) setting (select all that apply). Radioactive tracer   Tracer(s) used to identify sentinel nodes in the neoadjuvant setting (select all that apply). N/A   All nodes (colored or non-colored) present at the end of a dye-filled lymphatic channel were removed. N/A   All significantly radioactive nodes were removed. Yes   All palpably suspicious nodes were removed. N/A   Biopsy-proven positive nodes marked with clips prior to chemotherapy were identified and removed. N/A     [place] Node Biopsy for Breast Cancer - Left  Operation performed with curative intent. Yes   Tracer(s) used to identify sentinel nodes in the upfront surgery (non-neoadjuvant) setting (select all that apply). Radioactive tracer   Tracer(s) used to identify sentinel nodes in the neoadjuvant setting (select all that apply). N/A   All nodes (colored or non-colored) present at the end of a dye-filled lymphatic channel were removed. N/A   All significantly radioactive nodes were removed. Yes   All palpably suspicious nodes were removed. N/A   Biopsy-proven positive nodes marked with clips prior to chemotherapy were identified and removed. N/A             Surgeon:  Phuong Loredo MD    Circulator: Christa Mckeon RN; Michelle Reddy RN  Scrub Person: Umair  Emma  Nursing Assistant: Lucina Ray PCT  Assistant: Tomy Smith PA  Nurse Extern: Judith Chapa RN Extern     Assistant: Tomy Smith PA    Estimated Blood Loss: Very minimal    Findings: 2 right sentinel lymph nodes negative for macrometastases                    1 left sentinel lymph node negative for macrometastases    Complications: None      Indication for Procedure: Ms. Galeana is a 48-year-old lady who presented with bilateral breast mammogram abnormalities with finding of invasive ductal carcinoma at 12 o'clock position in the right breast and in the upper outer quadrant of the left breast.  She presents today for the above procedure with plan to have delayed reconstruction.    Procedure: Patient was taken to the operating by anesthesia placed supine on the table.  Following induction of general endotracheal anesthesia, SCDs were placed.  She received 2 g of Kefzol IV.  Anesthesia placed ultrasound-guided pectoralis nerve blocks bilaterally.  550 mCi of radioactive technetium was injected in both subareolar region.  The breasts, chest, axillas and upper arms were then prepped and draped in a sterile fashion.  Timeout was observed.  We proceeded with the left skin sparing mastectomy first which was done via a transverse elliptical incision, encompassing the areola and nipple complex.  A superior then inferior flaps were raised dissecting the breast tissue away from the flaps down to where the muscle was exposed, maintaining adequate thickness of the flaps.  Once in the axilla we identified a deep subfascial sentinel lymph node that had a very high radioactive count.  This was excised and sent to pathology for frozen section and noted to be negative for macrometastases.  No other significant radioactivity was noted or palpable nodes appreciated.  The breast was then removed off of the underlying pectoralis major muscle taking the fascia along with the specimen.  Larger vessels of the  breast were ligated with 3-0 silk suture.  The breast was removed as a whole, marked with a silk suture laterally, weighed and sent to pathology for permanent section.  The wound was then irrigated with warm water with clear return of fluid noted.  We then proceeded with the right skin sparing mastectomy which was done in a similar fashion.  Once in the axilla we identified 2 deep subfascial sentinel lymph node that had high radioactive count.  These were also excised and sent to pathology for frozen section and noted to be negative for macrometastases.  No other significant radioactivity or palpable nodes were appreciated in the axilla.  The breast was also removed as a whole, marked with a silk suture laterally, weighed and sent to pathology for permanent section.  The wound was irrigated with warm water with clear return of fluid noted.  15 Azeri round David-Miranda drains were then placed percutaneously on either side and anchored to the skin with 2-0 silk suture.  The subcutaneous tissues were approximated with interrupted 3-0 Vicryl sutures and the skin incisions were covered with Exofin fusion Premiere dressings.  ABDs with an Ace bandage were then applied.  The patient tolerated the procedure well with no complications.  She was extubated and taken to the recovery room in stable condition.  Sponge count and needle count were correct at the end of the procedure.    Assistant: Tomy Smith PA  was responsible for performing the following activities: Retraction, Suction, Closing, and Placing Dressing and their skilled assistance was necessary for the success of this case.      Phuong Loredo MD  03/21/24  09:50 EDT

## 2024-03-21 NOTE — ANESTHESIA POSTPROCEDURE EVALUATION
Patient: Ashlee Marte    Procedure Summary       Date: 03/21/24 Room / Location:  [facility] /  [facility]    Anesthesia Start: 0729 Anesthesia Stop: 0955    Procedure: BILATERAL BREAST MASTECTOMY WITH SENTINEL NODE BIOPSY BILATERAL (Bilateral: Breast) Diagnosis:       Malignant neoplasm of overlapping sites of right female breast      Malignant neoplasm of upper-outer quadrant of left female breast    Surgeons: Phuong Loredo MD Provider: Jaquan Kemp MD    Anesthesia Type: general ASA Status: 3            Anesthesia Type: general    Vitals  Vitals Value Taken Time   /75 03/21/24 1030   Temp 98.3 °F (36.8 °C) 03/21/24 1030   Pulse 65 03/21/24 1041   Resp 16 03/21/24 1030   SpO2 98 % 03/21/24 1041   Vitals shown include unfiled device data.        Post Anesthesia Care and Evaluation    Patient location during evaluation: PACU  Patient participation: complete - patient participated  Level of consciousness: awake and alert  Pain management: adequate    Airway patency: patent  Anesthetic complications: No anesthetic complications  PONV Status: none  Cardiovascular status: hemodynamically stable and acceptable  Respiratory status: nonlabored ventilation, acceptable and nasal cannula  Hydration status: acceptable

## 2024-03-21 NOTE — NURSING NOTE
When checking on patient noted bloody drainage on dressing of left PRASHANT, about 100 ml in bulb, emptied and stripped.  New dressing applied, another 20mls out.  Bruising from hematoma a little worse and swelling appears a little more.  Called Dr Antonio who is on call for Dr QUAN informed of situation and will monitor.  No new orders just monitor.  Family at bedside aware of drainage and call.  Vitals sign stable.

## 2024-03-21 NOTE — ANESTHESIA PROCEDURE NOTES
Airway  Urgency: elective    Date/Time: 3/21/2024 7:34 AM  Airway not difficult    General Information and Staff    Patient location during procedure: OR  SRNA: Sterling Shields SRNA  Indications and Patient Condition  Indications for airway management: airway protection    Preoxygenated: yes  MILS not maintained throughout  Mask difficulty assessment: 1 - vent by mask    Final Airway Details  Final airway type: endotracheal airway      Successful airway: ETT  Cuffed: yes   Successful intubation technique: direct laryngoscopy  Endotracheal tube insertion site: oral  Blade: Eryn  Blade size: 3  ETT size (mm): 7.0  Cormack-Lehane Classification: grade I - full view of glottis  Placement verified by: chest auscultation and capnometry   Cuff volume (mL): 7  Measured from: lips  ETT/EBT  to lips (cm): 21  Number of attempts at approach: 1  Assessment: lips, teeth, and gum same as pre-op and atraumatic intubation    Additional Comments  Negative epigastric sounds, Breath sound equal bilaterally with symmetric chest rise and fall

## 2024-03-21 NOTE — BRIEF OP NOTE
HEMATOMA EVACUATION TRUNK  Progress Note    Ashlee Marte  3/21/2024    Pre-op Diagnosis:   Left mastectomy hematoma       Post-Op Diagnosis Codes:     * Hematoma (nontraumatic) of breast [N64.89]    Procedure/CPT® Codes:        Procedure(s):  HEMATOMA EVACUATION left mastectomy              Surgeon(s):  Phuong Loredo MD    Anesthesia: Choice    Staff:   Circulator: Christa Mckeon, RN; Nela Mera RN; Michelle Reddy RN; Judith Chapa RN Extern  Physician Assistant: Devorah Vargas PA  Scrub Person: Nicolle Riddle  Nursing Assistant: Ramesh Kaufman; Florence Sanchez PCT         Estimated Blood Loss: minimal    Urine Voided: * No values recorded between 3/21/2024  1:43 PM and 3/21/2024  2:18 PM *    Specimens:                None          Drains:   Closed/Suction Drain 1 Left Breast Bulb 15 Fr. (Active)   Site Description Unable to view 03/21/24 1030   Dressing Status Clean;Dry;Intact 03/21/24 1030   Drainage Appearance Bloody 03/21/24 1030   Status To bulb suction 03/21/24 1030   Output (mL) 7.5 mL 03/21/24 1030       Closed/Suction Drain 1 Right Breast Bulb 15 Fr. (Active)   Site Description Unable to view 03/21/24 1030   Dressing Status Clean;Dry;Intact 03/21/24 1030   Drainage Appearance Bloody 03/21/24 1030   Status To bulb suction 03/21/24 1030   Output (mL) 25 mL 03/21/24 1030       Findings: Left mastectomy wound hematoma.  No active bleeding.                   500 mL of blood        Complications: None          Phuong Loredo MD     Date: 3/21/2024  Time: 14:30 EDT

## 2024-03-21 NOTE — OP NOTE
Operative Note    Ashlee Marte  [id number]   1975     Date of Surgery:  3/21/2024    Pre-Operative Diagnosis: Left mastectomy hematoma    Post-Operative Diagnosis: Left mastectomy hematoma    Procedure: Evacuation of left mastectomy hematoma    Anesthesia: General         Surgeon:  Phuong Loredo MD    Circulator: Christa Mckeon, NIKITA; Nela Mera, RN; Michelle Reddy, RN; Judith Chapa RN Extern  Physician Assistant: Devorah Vargas PA  Scrub Person: Nicolle Riddle  Nursing Assistant: Florence Sanchez PCT          Estimated Blood Loss: Very minimal    Findings: 500 mL of hematoma noted    Complications: None      Indication for Procedure: Ms. Wiley is a 48-year-old lady who presented with bilateral breast malignancies and underwent bilateral skin sparing mastectomies and sentinel lymph node biopsies earlier this morning.  She tolerated procedure well.  She was recovering well on the floor.  She got up to the bathroom and on her way back she was noted to have swelling in the left mastectomy site with increased drainage from the David-Miranda.  Patient has been clinically stable.  Patient is taken emergently to the operating room for evacuation of the hematoma.    Procedure: Patient was taken the op room anesthesia placed supine on the table.  Following induction of general endotracheal anesthesia, SCDs were placed.  She received 2 g of Kefzol IV.  The left chest was then prepped and draped in a sterile fashion.  Timeout was observed.  The mastectomy incision was opened.  Large hematoma was noted that was approximately 500 mL.  This was removed.  The wound was then irrigated with 2 L of water with no active bleeding noted.  Upon further exploration we noted minimal oozing at the superior skin flap laterally that was repaired with 3-0 Vicryl suture.  Another area was noted in the left axilla there was also repaired with 3-0 Vicryl suture.  The wound was irrigated again with warm water with  clear return of fluid noted.  It was inspected closely with no active bleeding noted.  Irrisept solution was then used.  Floseal was then placed at the sites of the repairs.  The subcutaneous tissue was approximated with interrupted 3 Vicryl sutures and the skin incision was covered with Exofin fusion Premiere dressing.  ABD with an Ace bandage were then applied.  The patient tolerated procedure well with no complications.  She was extubated and taken to the recovery room in stable condition.  Sponge count and needle count were correct at the end of the procedure.    MATTHEW Chakraborty assisted with retraction and suction.  Her contribution was essential for completion of the case.            Phuong Loredo MD  03/21/24  17:32 EDT

## 2024-03-21 NOTE — H&P
[employer]  Physician     H&P     Signed     Date of Service: 03/21/24 0000  Creation Time: 03/20/24 1042     Associated Documents  Scan on 3/20/2024 0936 by [employer]: [facility] [employer] [facility] [employer], 02/28/2024         Last signed by: [employer] at 03/20/24 1042      [facility] Pre-op    Full history and physical note from office is up to date.     There were no vitals taken for this visit.  Patient denies allergy to contrast dye or latex  IMM:  Influenza: Yes  Pneumococcal: Yes  Tetanus: Up-to-date  Lungs: Clear to auscultation bilaterally to the bases  Cardiovascular: S1-S2 without rubs murmurs or gallops  Abdomen: Soft, nontender, bowel sounds present throughout.    LAB Results:  Lab Results   Component Value Date    WBC 7.38 03/11/2024    HGB 13.0 03/11/2024    HCT 41.7 03/11/2024    MCV 83.1 03/11/2024     03/11/2024    NEUTROABS 5.8 12/14/2023    GLUCOSE 90 03/11/2024    BUN 19 03/11/2024    CREATININE 0.90 03/11/2024     03/11/2024    K 4.3 03/11/2024    CL 99 03/11/2024    CO2 29.0 03/11/2024    CALCIUM 9.3 03/11/2024    ALBUMIN 4.2 03/11/2024    AST 16 03/11/2024    ALT 12 03/11/2024    BILITOT 0.4 03/11/2024       Cancer Staging (if applicable)  Cancer Patient: _x_ yes __no __unknown__N/A; If yes, clinical stage I, T:_1_ N:_0_M:_0_,     Impression: Malignant neoplasm of overlapping sites of the right female breast                      Malignant neoplasm of upper outer quadrant of the left breast    Anxiety and depression  Chronic obstructive pulmonary disease  Asthma  Esophageal reflux disease    Plan: Bilateral skin sparing mastectomy with sentinel lymph node biopsy    MATTHEW Garcia   03/21/24   6:53 AM EDT

## 2024-03-21 NOTE — PROGRESS NOTES
"Patient Name:  Ashlee Marte  YOB: 1975  [id number]    Surgery Post - Operative Note    Date of visit: 3/21/2024    Subjective   Subjective: Feeling better. Had to be taken back to OR for hematoma evacuation       Objective     Objective:    /71 (BP Location: Right leg, Patient Position: Lying)   Pulse 85   Temp 97 °F (36.1 °C) (Temporal)   Resp 20   Ht 163.8 cm (64.49\")   Wt 93.9 kg (207 lb 0.2 oz)   SpO2 98%   BMI 35.00 kg/m²     CV:  Rate  regular and rhythm  regular  L:  Clear  to auscultation bilaterally. Dressings c/d/I. PRASHANT serosanguinous. No recurrent hematoma  ABD:  Soft, nontender  EXT:  No cyanosis, clubbing or edema             Assessment/ Plan: Doing well after B mastectomy with take-back for hematoma. Continue Pulmonary toilet    Problem List Items Addressed This Visit          Hematology and Neoplasia    * (Principal) Malignant neoplasm of overlapping sites of right female breast    Relevant Orders    Tissue Pathology Exam (Completed)     Other Visit Diagnoses       Malignant neoplasm of upper-outer quadrant of left female breast        Relevant Orders    Tissue Pathology Exam (Completed)             Active Hospital Problems    Diagnosis  POA    **Malignant neoplasm of overlapping sites of right female breast [C50.811]  Yes      Resolved Hospital Problems   No resolved problems to display.            Jerrod Antonio MD  3/21/2024  17:14 EDT    "

## 2024-03-21 NOTE — BRIEF OP NOTE
BREAST MASTECTOMY WITH SENTINEL NODE BIOPSY AND AXILLARY NODE DISSECTION BILATERAL  Progress Note    Ashlee Marte  3/21/2024    Pre-op Diagnosis:   Right breast cancer at 12 o'clock position  Left breast cancer in the upper outer quadrant       Post-Op Diagnosis Codes:     * Malignant neoplasm of overlapping sites of right female breast [C50.811]     * Malignant neoplasm of upper-outer quadrant of left female breast [C50.412]    Procedure/CPT® Codes:        Procedure(s):  BILATERAL BREAST MASTECTOMY WITH SENTINEL NODE BIOPSY BILATERAL              Surgeon(s):  Phuong Loredo MD    Anesthesia: General with Block    Staff:   Circulator: Christa Mckeon RN; Michelle Reddy RN  Scrub Person: Emma Block  Nursing Assistant: Lucina Ray PCT  Assistant: Tomy Smith PA  Nurse Extern: Judith Chapa RN Extern  Assistant: Tomy Smith PA      Estimated Blood Loss: minimal    Urine Voided: * No values recorded between 3/21/2024  7:29 AM and 3/21/2024  9:42 AM *    Specimens:                Specimens       ID Source Type Tests Collected By Collected At Frozen?    A [place] Lymph Node Tissue TISSUE PATHOLOGY EXAM   Phoung Loredo MD 3/21/24 0830 Yes    Description: left sentinel node    B Breast, Left Tissue TISSUE PATHOLOGY EXAM   Phuong Loredo MD 3/21/24 0835 No    Description: stitch is lateral    C [place] Lymph Node Tissue TISSUE PATHOLOGY EXAM   Phuong Loredo MD 3/21/24 0915 Yes    Description: right sentinel node    D Breast, Right Tissue TISSUE PATHOLOGY EXAM   Phuong Loredo MD 3/21/24 0916 No    Description: stitch is lateral                  Drains:   Closed/Suction Drain 1 Left Breast Bulb 15 Fr. (Active)       Closed/Suction Drain 1 Right Breast Bulb 15 Fr. (Active)       Findings: Left sentinel lymph node negative for macrometastases                   2 right sentinel lymph nodes negative for macrometastases        Complications:  None    Assistant: Tomy Smith PA  was responsible for performing the following activities: Retraction, Suction, Closing, and Placing Dressing and their skilled assistance was necessary for the success of this case.    Phuong Loredo MD     Date: 3/21/2024  Time: 09:46 EDT

## 2024-03-21 NOTE — ANESTHESIA PREPROCEDURE EVALUATION
Anesthesia Evaluation                  Airway   Mallampati: I  TM distance: >3 FB  Neck ROM: full  No difficulty expected  Dental      Pulmonary    (+) COPD, asthma,  Cardiovascular     ECG reviewed        Neuro/Psych  (+) psychiatric history Anxiety and Depression  GI/Hepatic/Renal/Endo    (+) obesity    Musculoskeletal     Abdominal    Substance History      OB/GYN          Other      history of cancer                Anesthesia Plan    ASA 3     general     (pecs)  intravenous induction     Anesthetic plan, risks, benefits, and alternatives have been provided, discussed and informed consent has been obtained with: patient.    Plan discussed with CRNA.    CODE STATUS:

## 2024-03-21 NOTE — ANESTHESIA PROCEDURE NOTES
Airway  Urgency: elective    Date/Time: 3/21/2024 1:46 PM  Airway not difficult    General Information and Staff    Patient location during procedure: OR  SRNA: Sterling Shields SRNA  Indications and Patient Condition  Indications for airway management: airway protection    Preoxygenated: yes  MILS not maintained throughout  Mask difficulty assessment: 1 - vent by mask    Final Airway Details  Final airway type: endotracheal airway      Successful airway: ETT  Cuffed: yes   Successful intubation technique: video laryngoscopy  Endotracheal tube insertion site: oral  Blade: Eryn  Blade size: 3  ETT size (mm): 7.0  Cormack-Lehane Classification: grade I - full view of glottis  Placement verified by: chest auscultation and capnometry   Cuff volume (mL): 7  Measured from: lips  ETT/EBT  to lips (cm): 22  Number of attempts at approach: 1  Assessment: lips, teeth, and gum same as pre-op and atraumatic intubation    Additional Comments  Negative epigastric sounds, Breath sound equal bilaterally with symmetric chest rise and fall

## 2024-03-21 NOTE — NURSING NOTE
Went in room to check on patient and found patient and daughter in the bathroom, patient pale and complaining of being lightheaded and blood on gown.  Patient states decided to go to bathroom and didn't want to bother staff.  Assisted patient back to bed with assistance of daughter.  Noticed bleeding from left PRASHANT site dressing saturated and bleeding around site.  Patient appears more lethargic and sleepy then on arrival.  Denies complaints except being sleepy. A & O x 3, Oxygen sat 100% on RA, blood pressure 95/56.  Upon further assessment noticed large bruising/hematoma on left breast, no bleeding on dressing.  Redressed PRASHANT drain site and noticed PRASHANT bulb filling up with blood.  After emptying noticed constant stream of blood in left PRASHANT.  BP rechecked 89/51.  Rapid response called, see charting for vitals, etc.

## 2024-03-21 NOTE — ANESTHESIA POSTPROCEDURE EVALUATION
Patient: Ashlee Marte    Procedure Summary       Date: 03/21/24 Room / Location:  [facility] /  [facility]    Anesthesia Start: 1343 Anesthesia Stop: 1446    Procedure: HEMATOMA EVACUATION TRUNK (Bilateral) Diagnosis: Hematoma (nontraumatic) of breast    Surgeons: Phuong Loredo MD Provider: Indra Howard MD    Anesthesia Type: Not recorded ASA Status: Not recorded            Anesthesia Type: No value filed.    Vitals  Vitals Value Taken Time   /67 03/21/24 1515   Temp 98 °F (36.7 °C) 03/21/24 1450   Pulse 69 03/21/24 1515   Resp 20 03/21/24 1515   SpO2 99 % 03/21/24 1515           Post Anesthesia Care and Evaluation    Patient location during evaluation: PACU  Patient participation: complete - patient participated  Level of consciousness: sleepy but conscious  Pain management: adequate    Airway patency: patent  Anesthetic complications: No anesthetic complications  PONV Status: none  Cardiovascular status: hemodynamically stable and acceptable  Respiratory status: nonlabored ventilation, acceptable, nasal cannula and oral airway  Hydration status: acceptable

## 2024-03-21 NOTE — ANESTHESIA PROCEDURE NOTES
"Peripheral Block      Patient reassessed immediately prior to procedure    Patient location during procedure: OR  Start time: 3/21/2024 7:33 AM  Reason for block: at surgeon's request and post-op pain management  Performed by  CRNA/CAA: Gaetano Sepulveda CRNASRNA: Sterling Shields SRNA  Preanesthetic Checklist  Completed: patient identified, IV checked, site marked, risks and benefits discussed, surgical consent, monitors and equipment checked, pre-op evaluation and timeout performed  Prep:  Pt Position: supine  Sterile barriers:cap, gloves, mask and washed/disinfected hands  Prep: ChloraPrep  Patient monitoring: blood pressure monitoring, continuous pulse oximetry and EKG  Procedure  Performed under: general  Guidance:ultrasound guided and landmark technique  Images:still images obtained, printed/placed on chart    Laterality:Bilateral  Block Type:PECS I and PECS II  Injection Technique:single-shot  Needle Type:short-bevel  Needle Gauge:20 G  Resistance on Injection: none          Medications  Preservative Free Saline:10ml  Comment:Block Injection:  Total volume of LA divided between Right and Left sided blocks         Post Assessment  Injection Assessment: negative aspiration for heme, incremental injection and no paresthesia on injection  Patient Tolerance:comfortable throughout block  Complications:no  Additional Notes  Interpectoral-Pectoserratus Plane   A high-frequency linear transducer, with sterile cover, was placed medial to the coracoid process in the paramedian sagittal plane. The transducer was moved caudally to the 4th rib and rotated slightly to allow an in-plane needle trajectory from medial to lateral. Pectoralis Major Muscle (PMM), Pectoralis Minor Muscle (PmM), Thoracoacromial Artery, Ribs, and Pleura were identified under ultrasound. The insertion site was prepped in sterile fashion and then localized with 2-5 ml of 1% Lidocaine. Using ultrasound-guidance, a 20-gauge B-Vincent 4\" Ultraplex 360 " non-stimulating echogenic needle was advanced in plane until the tip of the needle was in the fascial plane between the PMM and PmM, lateral to the Thoracoacromial Artery. 1-3ml of preservative free normal saline was used to hydro-dissect the fascial planes. After the fascial plane was verified, 10ml local anesthetic (LA) was injected for Interpectoral fascial plane block. The needle was continued along the same path to the level of the 4th rib below PmM.  Initially preservative free normal saline was used to confirm needle position and then 20 ml of LA was injected for Pectoserratus fascial plane block. Aspiration every 5 ml to prevent intravascular injection. Injection was completed with negative aspiration of blood and negative intravascular injection. Injection pressures were normal with minimal resistance.

## 2024-03-22 LAB
ABO GROUP BLD: NORMAL
ANION GAP SERPL CALCULATED.3IONS-SCNC: 10 MMOL/L (ref 5–15)
BUN SERPL-MCNC: 13 MG/DL (ref 6–20)
BUN/CREAT SERPL: 12.5 (ref 7–25)
CALCIUM SPEC-SCNC: 8.2 MG/DL (ref 8.6–10.5)
CHLORIDE SERPL-SCNC: 100 MMOL/L (ref 98–107)
CO2 SERPL-SCNC: 24 MMOL/L (ref 22–29)
CREAT SERPL-MCNC: 1.04 MG/DL (ref 0.57–1)
DEPRECATED RDW RBC AUTO: 49.1 FL (ref 37–54)
EGFRCR SERPLBLD CKD-EPI 2021: 65.6 ML/MIN/1.73
ERYTHROCYTE [DISTWIDTH] IN BLOOD BY AUTOMATED COUNT: 15.4 % (ref 12.3–15.4)
GLUCOSE SERPL-MCNC: 113 MG/DL (ref 65–99)
HCT VFR BLD AUTO: 25.5 % (ref 34–46.6)
HGB BLD-MCNC: 7.8 G/DL (ref 12–15.9)
MCH RBC QN AUTO: 26.5 PG (ref 26.6–33)
MCHC RBC AUTO-ENTMCNC: 30.6 G/DL (ref 31.5–35.7)
MCV RBC AUTO: 86.7 FL (ref 79–97)
PLATELET # BLD AUTO: 370 10*3/MM3 (ref 140–450)
PMV BLD AUTO: 10.1 FL (ref 6–12)
POTASSIUM SERPL-SCNC: 4.9 MMOL/L (ref 3.5–5.2)
RBC # BLD AUTO: 2.94 10*6/MM3 (ref 3.77–5.28)
RH BLD: POSITIVE
SODIUM SERPL-SCNC: 134 MMOL/L (ref 136–145)
WBC NRBC COR # BLD AUTO: 14.87 10*3/MM3 (ref 3.4–10.8)

## 2024-03-22 PROCEDURE — 86901 BLOOD TYPING SEROLOGIC RH(D): CPT

## 2024-03-22 PROCEDURE — 25810000003 SODIUM CHLORIDE 0.9 % SOLUTION: Performed by: SURGERY

## 2024-03-22 PROCEDURE — 25010000002 CEFAZOLIN PER 500 MG: Performed by: SURGERY

## 2024-03-22 PROCEDURE — 80048 BASIC METABOLIC PNL TOTAL CA: CPT | Performed by: SURGERY

## 2024-03-22 PROCEDURE — 86900 BLOOD TYPING SEROLOGIC ABO: CPT

## 2024-03-22 PROCEDURE — 85027 COMPLETE CBC AUTOMATED: CPT | Performed by: SURGERY

## 2024-03-22 RX ORDER — CALCIUM CARBONATE 500 MG/1
2 TABLET, CHEWABLE ORAL 3 TIMES DAILY PRN
Status: DISCONTINUED | OUTPATIENT
Start: 2024-03-22 | End: 2024-03-23 | Stop reason: HOSPADM

## 2024-03-22 RX ORDER — FERROUS SULFATE 325(65) MG
325 TABLET ORAL 2 TIMES DAILY WITH MEALS
Status: DISCONTINUED | OUTPATIENT
Start: 2024-03-22 | End: 2024-03-23 | Stop reason: HOSPADM

## 2024-03-22 RX ORDER — PANTOPRAZOLE SODIUM 40 MG/1
40 TABLET, DELAYED RELEASE ORAL 2 TIMES DAILY
Status: DISCONTINUED | OUTPATIENT
Start: 2024-03-22 | End: 2024-03-23 | Stop reason: HOSPADM

## 2024-03-22 RX ADMIN — OXYCODONE 5 MG: 5 TABLET ORAL at 23:32

## 2024-03-22 RX ADMIN — FLUOXETINE 40 MG: 20 CAPSULE ORAL at 20:19

## 2024-03-22 RX ADMIN — SODIUM CHLORIDE 2000 MG: 900 INJECTION INTRAVENOUS at 01:59

## 2024-03-22 RX ADMIN — CLONIDINE HYDROCHLORIDE 0.1 MG: 0.1 TABLET ORAL at 20:30

## 2024-03-22 RX ADMIN — PANTOPRAZOLE SODIUM 40 MG: 40 TABLET, DELAYED RELEASE ORAL at 23:02

## 2024-03-22 RX ADMIN — ACETAMINOPHEN 1000 MG: 500 TABLET ORAL at 08:18

## 2024-03-22 RX ADMIN — OXYCODONE 5 MG: 5 TABLET ORAL at 01:57

## 2024-03-22 RX ADMIN — SODIUM CHLORIDE 100 ML/HR: 9 INJECTION, SOLUTION INTRAVENOUS at 21:13

## 2024-03-22 RX ADMIN — METOPROLOL SUCCINATE 25 MG: 25 TABLET, EXTENDED RELEASE ORAL at 20:31

## 2024-03-22 RX ADMIN — PANTOPRAZOLE SODIUM 40 MG: 40 TABLET, DELAYED RELEASE ORAL at 05:04

## 2024-03-22 RX ADMIN — FERROUS SULFATE TAB 325 MG (65 MG ELEMENTAL FE) 325 MG: 325 (65 FE) TAB at 08:19

## 2024-03-22 RX ADMIN — DIAZEPAM 2 MG: 2 TABLET ORAL at 13:52

## 2024-03-22 RX ADMIN — CLONIDINE HYDROCHLORIDE 0.1 MG: 0.1 TABLET ORAL at 08:18

## 2024-03-22 RX ADMIN — ACETAMINOPHEN 1000 MG: 500 TABLET ORAL at 13:50

## 2024-03-22 RX ADMIN — SODIUM CHLORIDE 100 ML/HR: 9 INJECTION, SOLUTION INTRAVENOUS at 13:52

## 2024-03-22 RX ADMIN — MELOXICAM 15 MG: 7.5 TABLET ORAL at 08:18

## 2024-03-22 RX ADMIN — OXYCODONE 5 MG: 5 TABLET ORAL at 17:30

## 2024-03-22 RX ADMIN — FERROUS SULFATE TAB 325 MG (65 MG ELEMENTAL FE) 325 MG: 325 (65 FE) TAB at 17:30

## 2024-03-22 RX ADMIN — ACETAMINOPHEN 1000 MG: 500 TABLET ORAL at 21:14

## 2024-03-22 RX ADMIN — ACETAMINOPHEN 1000 MG: 500 TABLET ORAL at 01:57

## 2024-03-22 NOTE — ADDENDUM NOTE
Addendum  created 03/22/24 0916 by Gaetano Sepulveda CRNA    Clinical Note Signed, Order Canceled from Note

## 2024-03-22 NOTE — CASE MANAGEMENT/SOCIAL WORK
Case Management Discharge Note      Final Note: Plan is home with family transporting. No discharge needs         Selected Continued Care - Admitted Since 3/21/2024       Destination    No services have been selected for the patient.                Durable Medical Equipment    No services have been selected for the patient.                Dialysis/Infusion    No services have been selected for the patient.                Home Medical Care    No services have been selected for the patient.                Therapy    No services have been selected for the patient.                Community Resources    No services have been selected for the patient.                Community & DME    No services have been selected for the patient.                    Selected Continued Care - Episodes Includes continued care and service providers with selected services from the active episodes listed below      Rising Risk Care Management Episode start date: 3/4/2024 (Paused)   There are no active outsourced providers for this episode.                      Final Discharge Disposition Code: 01 - home or self-care

## 2024-03-22 NOTE — PLAN OF CARE
Goal Outcome Evaluation:  Plan of Care Reviewed With: patient, family        Progress: improving  Outcome Evaluation: Pt opted to stay and be monitored for 1 more night. Total PRASHANT output on left has been 125ml red, serosanguinous; right output 85ml pink-tinged serosanguinous. Bilat breast incisions dry, approximated. ABD pads changed and ACE bandage reapplied. Split gauze to PRASHANT sites for scant drainage. VSS. Pt voiding adequately; tolerating regular diet.

## 2024-03-22 NOTE — PROGRESS NOTES
"Ashlee Marte  1975  [id number]    Surgery Progress Note    Date of visit: 3/22/2024    Subjective: Comfortable with no complaints of pain  Ambulating to the bathroom    Objective:    /73 (BP Location: Right leg, Patient Position: Lying)   Pulse 101   Temp 97.8 °F (36.6 °C) (Temporal)   Resp 18   Ht 163.8 cm (64.49\")   Wt 93.9 kg (207 lb 0.2 oz)   SpO2 95%   BMI 35.00 kg/m²     Intake/Output Summary (Last 24 hours) at 3/22/2024 0745  Last data filed at 3/22/2024 0740  Gross per 24 hour   Intake 1500 ml   Output 1508.5 ml   Net -8.5 ml       CV: Regular rate and rhythm  L: normal air entry  BREAST: Bilateral mastectomy incisions are intact  No swelling  Bruising on the left  David-Miranda drainage is adequate, subsided on the left      LABS:    Results from last 7 days   Lab Units 03/22/24  0521   WBC 10*3/mm3 14.87*   HEMOGLOBIN g/dL 7.8*   HEMATOCRIT % 25.5*   PLATELETS 10*3/mm3 370     Results from last 7 days   Lab Units 03/22/24  0521   SODIUM mmol/L 134*   POTASSIUM mmol/L 4.9   CHLORIDE mmol/L 100   CO2 mmol/L 24.0   BUN mg/dL 13   CREATININE mg/dL 1.04*   CALCIUM mg/dL 8.2*   GLUCOSE mg/dL 113*     Results from last 7 days   Lab Units 03/22/24  0521   SODIUM mmol/L 134*   POTASSIUM mmol/L 4.9   CHLORIDE mmol/L 100   CO2 mmol/L 24.0   BUN mg/dL 13   CREATININE mg/dL 1.04*   GLUCOSE mg/dL 113*   CALCIUM mg/dL 8.2*     No results found for: \"LIPASE\"      Assessment/ Plan: Patient status post bilateral skin sparing mastectomies and bilateral sentinel lymph node biopsy secondary to bilateral breast malignancy  She developed left mastectomy hematoma that was evacuated  She has been clinically stable  Plan to continue with close observation  Continue with hydration  Increase activity  Iron supplement  Home when she feels comfortable    Problem List Items Addressed This Visit          Hematology and Neoplasia    * (Principal) Malignant neoplasm of overlapping sites of right female breast    " Relevant Orders    Tissue Pathology Exam (Completed)     Other Visit Diagnoses       Malignant neoplasm of upper-outer quadrant of left female breast        Relevant Orders    Tissue Pathology Exam (Completed)              Phuong Loredo MD  3/22/2024  07:45 EDT

## 2024-03-22 NOTE — PLAN OF CARE
Goal Outcome Evaluation:   Patient returned to floor from OR, VSS, awake, alert & oriented x3, pleasant mood, no complaints.  PRASHANT drains times 2 to bulb suction small amount of bleeding noted in drains, no bleeding on dressings. 100% on RA, will monitor.  Family at bedside supportive and assisting with care.

## 2024-03-23 VITALS
HEART RATE: 62 BPM | WEIGHT: 207.01 LBS | SYSTOLIC BLOOD PRESSURE: 134 MMHG | DIASTOLIC BLOOD PRESSURE: 70 MMHG | BODY MASS INDEX: 35.34 KG/M2 | RESPIRATION RATE: 16 BRPM | TEMPERATURE: 98 F | HEIGHT: 64 IN | OXYGEN SATURATION: 94 %

## 2024-03-23 LAB
DEPRECATED RDW RBC AUTO: 49.8 FL (ref 37–54)
ERYTHROCYTE [DISTWIDTH] IN BLOOD BY AUTOMATED COUNT: 15.7 % (ref 12.3–15.4)
HCT VFR BLD AUTO: 20.8 % (ref 34–46.6)
HCT VFR BLD AUTO: 23.3 % (ref 34–46.6)
HGB BLD-MCNC: 6.1 G/DL (ref 12–15.9)
HGB BLD-MCNC: 7 G/DL (ref 12–15.9)
MCH RBC QN AUTO: 25.4 PG (ref 26.6–33)
MCHC RBC AUTO-ENTMCNC: 29.3 G/DL (ref 31.5–35.7)
MCV RBC AUTO: 86.7 FL (ref 79–97)
PLATELET # BLD AUTO: 283 10*3/MM3 (ref 140–450)
PMV BLD AUTO: 10.1 FL (ref 6–12)
RBC # BLD AUTO: 2.4 10*6/MM3 (ref 3.77–5.28)
WBC NRBC COR # BLD AUTO: 10.27 10*3/MM3 (ref 3.4–10.8)

## 2024-03-23 PROCEDURE — 85018 HEMOGLOBIN: CPT | Performed by: SURGERY

## 2024-03-23 PROCEDURE — 85014 HEMATOCRIT: CPT | Performed by: SURGERY

## 2024-03-23 PROCEDURE — P9016 RBC LEUKOCYTES REDUCED: HCPCS

## 2024-03-23 PROCEDURE — 86900 BLOOD TYPING SEROLOGIC ABO: CPT

## 2024-03-23 PROCEDURE — 85027 COMPLETE CBC AUTOMATED: CPT | Performed by: SURGERY

## 2024-03-23 PROCEDURE — 36430 TRANSFUSION BLD/BLD COMPNT: CPT

## 2024-03-23 RX ORDER — OXYCODONE HYDROCHLORIDE 5 MG/1
5 TABLET ORAL EVERY 8 HOURS PRN
Qty: 7 TABLET | Refills: 0 | Status: SHIPPED | OUTPATIENT
Start: 2024-03-23 | End: 2024-03-26

## 2024-03-23 RX ADMIN — ACETAMINOPHEN 1000 MG: 500 TABLET ORAL at 08:29

## 2024-03-23 RX ADMIN — DIAZEPAM 2 MG: 2 TABLET ORAL at 02:27

## 2024-03-23 RX ADMIN — PANTOPRAZOLE SODIUM 40 MG: 40 TABLET, DELAYED RELEASE ORAL at 08:29

## 2024-03-23 RX ADMIN — ACETAMINOPHEN 1000 MG: 500 TABLET ORAL at 02:21

## 2024-03-23 RX ADMIN — MELOXICAM 15 MG: 7.5 TABLET ORAL at 08:29

## 2024-03-23 RX ADMIN — FERROUS SULFATE TAB 325 MG (65 MG ELEMENTAL FE) 325 MG: 325 (65 FE) TAB at 08:29

## 2024-03-23 NOTE — PLAN OF CARE
Goal Outcome Evaluation:  Plan of Care Reviewed With: patient           Outcome Evaluation: VSS. RA. Ambulating in urban. 1 unit PRBCs infused, no complications. Repeate Hgb 7.0. Dr. AVELINA simmons discharge. Patient ready to go home. PRASHANT drains with serous-serousanguinous drainage. R PRASHANT out 80, L PRASHANT out 100. Followup education on PRASHANT drain care complete. No further needs identified at this time. Discharge to home with family care via transport. at 1435.

## 2024-03-23 NOTE — PLAN OF CARE
Problem: Adult Inpatient Plan of Care  Goal: Plan of Care Review  Outcome: Ongoing, Progressing  Flowsheets (Taken 3/23/2024 0502)  Progress: improving  Plan of Care Reviewed With: patient  Outcome Evaluation: VSS. Adequate I&O.  Ambulating to RR. Oxy IR x1 prn for pain.  Valium prn x1 for pain. Left PRASHANT with 160 ml serosanguinous drainage. Right PRASHANT with 105 ml pink tinged drainage. Educated on drain.  Educated and reitterated about not putting pressure on arms or lifting arms above head. Rested well. Anticipate DC in am.   Goal Outcome Evaluation:  Plan of Care Reviewed With: patient        Progress: improving  Outcome Evaluation: VSS. Adequate I&O.  Ambulating to RR. Oxy IR x1 prn for pain.  Valium prn x1 for pain. Left PRASHANT with 160 ml serosanguinous drainage. Right PRASHANT with 105 ml pink tinged drainage. Educated on drain.  Educated and reitterated about not putting pressure on arms or lifting arms above head. Rested well. Anticipate DC in am.

## 2024-03-23 NOTE — PROGRESS NOTES
"Ashlee Marte  1975  [id number]    Surgery Progress Note    Date of visit: 3/23/2024    Subjective: No complaints of pain  Ambulating in the room   voiding      Objective:    /80 (BP Location: Left leg, Patient Position: Lying)   Pulse 85   Temp 97.7 °F (36.5 °C) (Temporal)   Resp 18   Ht 163.8 cm (64.49\")   Wt 93.9 kg (207 lb 0.2 oz)   SpO2 95%   BMI 35.00 kg/m²     Intake/Output Summary (Last 24 hours) at 3/23/2024 0830  Last data filed at 3/23/2024 0555  Gross per 24 hour   Intake 2650 ml   Output 440 ml   Net 2210 ml       CV: Regular rate and rhythm  L: normal air entry  BREAST: Bilateral mastectomy incisions are intact   flaps are viable with no swelling  Adequate David-Miranda drainage with serosanguineous output  Bruising appreciated on the left    LABS:    Results from last 7 days   Lab Units 03/23/24  0435   WBC 10*3/mm3 10.27   HEMOGLOBIN g/dL 6.1*   HEMATOCRIT % 20.8*   PLATELETS 10*3/mm3 283     Results from last 7 days   Lab Units 03/22/24  0521   SODIUM mmol/L 134*   POTASSIUM mmol/L 4.9   CHLORIDE mmol/L 100   CO2 mmol/L 24.0   BUN mg/dL 13   CREATININE mg/dL 1.04*   CALCIUM mg/dL 8.2*   GLUCOSE mg/dL 113*     Results from last 7 days   Lab Units 03/22/24  0521   SODIUM mmol/L 134*   POTASSIUM mmol/L 4.9   CHLORIDE mmol/L 100   CO2 mmol/L 24.0   BUN mg/dL 13   CREATININE mg/dL 1.04*   GLUCOSE mg/dL 113*   CALCIUM mg/dL 8.2*     No results found for: \"LIPASE\"      Assessment/ Plan: Patient status post bilateral skin sparing mastectomies and sentinel lymph node biopsy secondary to bilateral breast malignancies  She developed a left mastectomy hematoma that was evacuated  She is progressing slowly  Hemoglobin down to 6.1.  Patient has been clinically stable with no tachycardia  Plan to transfuse 1 unit of packed red blood cells  Increase activity  Home later today as long as she is feeling well  Follow-up in the cancer clinic on 3/27/2024  Instructions were given to the " patient  Tylenol 1000 mg p.o. 3 times daily for 4 days  Oxycodone as needed    Problem List Items Addressed This Visit          Hematology and Neoplasia    * (Principal) Malignant neoplasm of overlapping sites of right female breast    Relevant Orders    Tissue Pathology Exam (Completed)     Other Visit Diagnoses       Malignant neoplasm of upper-outer quadrant of left female breast        Relevant Orders    Tissue Pathology Exam (Completed)              Phuong Loredo MD  3/23/2024  08:30 EDT

## 2024-03-24 LAB
BH BB BLOOD EXPIRATION DATE: NORMAL
BH BB BLOOD TYPE BARCODE: 5100
BH BB DISPENSE STATUS: NORMAL
BH BB PRODUCT CODE: NORMAL
BH BB UNIT NUMBER: NORMAL
CROSSMATCH INTERPRETATION: NORMAL
UNIT  ABO: NORMAL
UNIT  RH: NORMAL

## 2024-03-26 ENCOUNTER — CLINICAL SUPPORT (OUTPATIENT)
Dept: GENETICS | Facility: HOSPITAL | Age: 49
End: 2024-03-26
Payer: COMMERCIAL

## 2024-03-26 DIAGNOSIS — Z17.0 MALIGNANT NEOPLASM OF OVERLAPPING SITES OF BOTH BREASTS IN FEMALE, ESTROGEN RECEPTOR POSITIVE: ICD-10-CM

## 2024-03-26 DIAGNOSIS — C50.811 MALIGNANT NEOPLASM OF OVERLAPPING SITES OF BOTH BREASTS IN FEMALE, ESTROGEN RECEPTOR POSITIVE: ICD-10-CM

## 2024-03-26 DIAGNOSIS — C50.812 MALIGNANT NEOPLASM OF OVERLAPPING SITES OF BOTH BREASTS IN FEMALE, ESTROGEN RECEPTOR POSITIVE: ICD-10-CM

## 2024-03-26 DIAGNOSIS — Z80.3 FAMILY HISTORY OF BREAST CANCER: ICD-10-CM

## 2024-03-26 DIAGNOSIS — Z80.41 FAMILY HISTORY OF OVARIAN CANCER: ICD-10-CM

## 2024-03-26 DIAGNOSIS — Z80.0 FAMILY HISTORY OF PANCREATIC CANCER: ICD-10-CM

## 2024-03-26 DIAGNOSIS — Z13.79 GENETIC TESTING: Primary | ICD-10-CM

## 2024-03-26 DIAGNOSIS — Z80.49 FAMILY HISTORY OF UTERINE CANCER: ICD-10-CM

## 2024-03-26 DIAGNOSIS — Z80.51 FAMILY HISTORY OF KIDNEY CANCER: ICD-10-CM

## 2024-03-26 LAB
CYTO UR: NORMAL
LAB AP CASE REPORT: NORMAL
LAB AP CLINICAL INFORMATION: NORMAL
LAB AP DIAGNOSIS COMMENT: NORMAL
Lab: NORMAL
PATH REPORT.FINAL DX SPEC: NORMAL
PATH REPORT.GROSS SPEC: NORMAL

## 2024-03-26 NOTE — PROGRESS NOTES
Ahslee Marte is a 48 y.o. female who was referred for genetic counseling due to a personal history of breast cancer. Genetic counseling was performed via telephone. Ms. Marte confirmed her full name, date of birth, and that she was physically located in the [facility] at the time of the appointment. Ms. Marte was recently diagnosed with a bilateral breast cancer. Her right breast cancer was an invasive ductal which was ER positive, IN positive, and HER2 negative. Her left breast cancer was an invasive ductal which was essentially triple negative. She underwent bilateral mastectomy on 3/21 and reports her current treatment plan is 4 rounds of chemotherapy. Ms. Marte retains her uterus and ovaries. She has not yet started colonoscopies. Ms. Marte was interested in discussing her risk for a hereditary cancer syndrome, and decided to pursue genetic testing. The Common Hereditary Cancers panel was ordered through [employer] which analyzes 48 genes associated with an increased cancer risk. She plans to have her blood was drawn tomorrow, 3/27/2024, for genetic testing at [facility]. Results are expected 2-3 weeks after the lab receives the sample.     PERTINENT FAMILY HISTORY:  Mother:    Lung cancer, 66  Mat. Half-Aunt 1:   Ovarian cancer, 60s  Mat. Half-Aunt 2:   Skin cancer  Pat. Aunt 1:    Breast cancer, 50s      Possible uterine cancer, 30s  Pat. Uncle 1:    Esophageal cancer  Pat. Uncle 2:    Lung cancer, 60s      Kidney cancer, 60s  Pat. Uncle 3:    Unknown, possible colon cancer  Pat. Uncle 4:    Pancreatic cancer  Pat. Half-Aunt:   Breast cancer  Pat. Grandmother:   Breast cancer, 50s    Medical records regarding the diagnoses in the family were not available for review.     RISK ASSESSMENT:  Ms. Marte's personal history of breast cancer led to concern for a hereditary cancer syndrome. she clearly meets NCCN criteria for BRCA1/2 testing based on her personal history of breast cancer. We discussed  multigene panel testing that would evaluate multiple genes simultaneously associated with hereditary cancer risk. This risk assessment is based on the family history information provided at the time of the appointment and could change in the future should new information be obtained.    GENETIC COUNSELING (30 minutes) We reviewed the family history information in detail.  Cases of cancer follow three general patterns: sporadic, familial, and hereditary.  While most cancer is sporadic, some cases appear to occur in family clusters.  These cases are said to be familial and account for 10-20% of cancer cases.  Familial cases may be due to a combination of shared genes and environmental factors among family members.  In even fewer families, the cancer is said to be inherited, and the genes responsible for the cancer are known.      Family histories typical of hereditary cancer syndromes usually include multiple first- and second-degree relatives diagnosed with cancer types that define a syndrome.  These cases tend to be diagnosed at younger-than-expected ages and can be bilateral or multifocal.  The cancer in these families follows an autosomal dominant inheritance pattern, which indicates the likely presence of a mutation in a cancer susceptibility gene.  Children and siblings of an individual believed to carry this mutation have a 50% chance of inheriting that mutation, thereby inheriting the increased risk to develop cancer.  These mutations can be passed down from the maternal or the paternal lineage.    Hereditary breast cancer accounts for 5-10% of all cases of breast cancer.  A significant proportion of hereditary breast and ovarian cancer can be attributed to mutations in the BRCA1 and BRCA2 genes.  Mutations in these genes confer an increased risk for breast cancer, ovarian cancer, male breast cancer, prostate cancer, and pancreatic cancer. Women with a BRCA1 or BRCA2 mutation who have already been diagnosed  with breast cancer have a 20-40% risk of a contralateral breast cancer. Women with a BRCA1 or BRCA2 mutation have up to a 60% risk of ovarian cancer.  BRCA1 has been more significantly associated with triple negative breast cancers than other genes. There are other genes considered to confer a high risk for breast cancer.    There are other genes that are known to be associated with an increased risk for cancer.  Some of these genes have well defined risks and established management guidelines.  Other genes that can be tested for have been more recently described, and there may be less data regarding the risks and therefore may not have established management guidelines.  Based on Ms. Marte's desire to get as much information as possible regarding her personal risks and potential risks for her family, she opted to pursue testing through a panel that would evaluate multiple genes that have been associated with cancer risk.     GENETIC TESTING:  The risks, benefits and limitations of genetic testing and implications for clinical management following testing were reviewed.  DNA test results can influence decisions regarding screening, prevention and surgical management.  Genetic testing can have significant psychological implications for both individuals and families.  Also discussed was the possibility of employment and insurance discrimination based on genetic test results and the laws in place to prevent this (NASH).    We discussed panel testing that would evaluate 48 genes associated with increased cancer risk. The implications of a positive or negative test result were discussed. We discussed the possibility that, in some cases, genetic test results may be informative or may be ambiguous due to the identification of a genetic variant. These variants may or may not be associated with an increased cancer risk.  With multigene panel testing, it is not uncommon for a variant of uncertain significance (VUS) to be  identified.  If a VUS is identified, testing unaffected family members is typically not recommended and screening recommendations are made based on the family history.  The laboratories that perform genetic testing work to reclassify the VUS and send out an amended report if and when a VUS is reclassified.  The majority of variant findings are ultimately reclassified to a negative result.  Given her personal and family history, a negative test result would not eliminate all risk to her relatives.      PLAN: Results are expected in 2-3 weeks. Ms. Marte will be contacted by telephone to discuss results at that time. she is welcome to contact us in the meantime with any questions or concerns at [phone].      Norma Bowden MS, [facility], [facility]  Licensed Certified Genetic Counselor

## 2024-03-27 ENCOUNTER — LAB (OUTPATIENT)
Dept: LAB | Facility: HOSPITAL | Age: 49
End: 2024-03-27
Payer: COMMERCIAL

## 2024-03-27 DIAGNOSIS — Z13.79 GENETIC TESTING: Primary | ICD-10-CM

## 2024-04-02 ENCOUNTER — OFFICE VISIT (OUTPATIENT)
Dept: ONCOLOGY | Facility: CLINIC | Age: 49
End: 2024-04-02
Payer: COMMERCIAL

## 2024-04-02 VITALS
BODY MASS INDEX: 33.66 KG/M2 | HEART RATE: 78 BPM | SYSTOLIC BLOOD PRESSURE: 140 MMHG | OXYGEN SATURATION: 95 % | WEIGHT: 202 LBS | TEMPERATURE: 97.8 F | RESPIRATION RATE: 18 BRPM | DIASTOLIC BLOOD PRESSURE: 67 MMHG | HEIGHT: 65 IN

## 2024-04-02 DIAGNOSIS — C50.811 MALIGNANT NEOPLASM OF OVERLAPPING SITES OF BOTH BREASTS IN FEMALE, ESTROGEN RECEPTOR POSITIVE: Primary | ICD-10-CM

## 2024-04-02 DIAGNOSIS — C50.812 MALIGNANT NEOPLASM OF OVERLAPPING SITES OF BOTH BREASTS IN FEMALE, ESTROGEN RECEPTOR POSITIVE: Primary | ICD-10-CM

## 2024-04-02 DIAGNOSIS — Z17.1 MALIGNANT NEOPLASM OF OVERLAPPING SITES OF RIGHT BREAST IN FEMALE, ESTROGEN RECEPTOR NEGATIVE: ICD-10-CM

## 2024-04-02 DIAGNOSIS — Z17.0 MALIGNANT NEOPLASM OF OVERLAPPING SITES OF BOTH BREASTS IN FEMALE, ESTROGEN RECEPTOR POSITIVE: Chronic | ICD-10-CM

## 2024-04-02 DIAGNOSIS — C50.811 MALIGNANT NEOPLASM OF OVERLAPPING SITES OF RIGHT FEMALE BREAST, UNSPECIFIED ESTROGEN RECEPTOR STATUS: Primary | Chronic | ICD-10-CM

## 2024-04-02 DIAGNOSIS — C50.811 MALIGNANT NEOPLASM OF OVERLAPPING SITES OF BOTH BREASTS IN FEMALE, ESTROGEN RECEPTOR POSITIVE: Chronic | ICD-10-CM

## 2024-04-02 DIAGNOSIS — Z17.0 MALIGNANT NEOPLASM OF OVERLAPPING SITES OF BOTH BREASTS IN FEMALE, ESTROGEN RECEPTOR POSITIVE: Primary | ICD-10-CM

## 2024-04-02 DIAGNOSIS — C50.812 MALIGNANT NEOPLASM OF OVERLAPPING SITES OF BOTH BREASTS IN FEMALE, ESTROGEN RECEPTOR POSITIVE: Chronic | ICD-10-CM

## 2024-04-02 DIAGNOSIS — C50.811 MALIGNANT NEOPLASM OF OVERLAPPING SITES OF RIGHT BREAST IN FEMALE, ESTROGEN RECEPTOR NEGATIVE: ICD-10-CM

## 2024-04-02 RX ORDER — DIPHENHYDRAMINE HYDROCHLORIDE 50 MG/ML
50 INJECTION INTRAMUSCULAR; INTRAVENOUS AS NEEDED
OUTPATIENT
Start: 2024-04-17

## 2024-04-02 RX ORDER — SODIUM CHLORIDE 9 MG/ML
20 INJECTION, SOLUTION INTRAVENOUS ONCE
OUTPATIENT
Start: 2024-04-17

## 2024-04-02 RX ORDER — FAMOTIDINE 10 MG/ML
20 INJECTION, SOLUTION INTRAVENOUS AS NEEDED
OUTPATIENT
Start: 2024-04-17

## 2024-04-02 RX ORDER — PALONOSETRON 0.05 MG/ML
0.25 INJECTION, SOLUTION INTRAVENOUS ONCE
OUTPATIENT
Start: 2024-04-17

## 2024-04-02 NOTE — PROGRESS NOTES
CHIEF COMPLAINT: Fatigue and some discomfort at wound healing    Problem List:  Oncology/Hematology History Overview Note   1.  Bilateral breast cancer:  Right side invasive ductal clinical T1c N0, 1.4 centimeter, ER positive WA positive HER2 negative, grade 2.  Pathologic 1.1 cm T1c N0 on mastectomy  Left side invasive ductal clinical T1b N0, 8 MM, ER negative, WA weakly positive, HER2/nabila negative, essentially triple negative grade 3.  Pathologic T1 a N0 with no residual tumor on mastectomy  Initial bilateral mastectomy recommended.    2.  COPD    Oncology history timeline:  -1/11/2024 bilateral screening mammogram [facility] read [facility] imaging showed asymmetry right breast additional views needed with mass in left breast requiring additional imaging  -2/1/2024 bilateral diagnostic mammogram [facility] showed persistent focal asymmetry right 12:00 7 cm from the nipple 7 mm hypoechoic mass indistinct with irregular margins ultrasound-guided biopsy recommended.  Left breast showed 8 mm 3:00 oval mass 10 cm from the nipple that on the ultrasound was 6 x 5 x 5 mm.  Incidental 4 mm 2:00 hypoechoic mass with internal vascularity 5 cm from the nipple for which ultrasound-guided biopsy recommended  -2/5/2024 right ultrasound-guided core biopsy 7 mm mass with HydroMARK T4 shaped tissue marker placed at the biopsy site.  Left ultrasound breast core biopsy 3:00 6 mm mass and 2:00 4 mm mass with HydroMARK T3 and T4 respectively.  Right breast 12:00 lesion grade 2 invasive ductal Jimenez-Cui 6 out of 9, 4 mm.  % 3+, WA 50% 3+.  Also intermediate grade ductal carcinoma in situ  Left breast 3:00 invasive ductal carcinoma grade 3 Bloom-Cui 9 out of 9, 5-6 mm, ER 0%, WA 25% 1+, HER2/nabila negative 1+  Left breast 2:00 core biopsy fibroadenoma  -2/12/2024 office note Dr. Gregorio Ashton indicates patient with newly diagnosed bilateral breast cancer.   Screening mammogram showed 2 abnormalities in the left breast and 1 in the right.    No personal or family history of breast cancer.  Has hot flashes but not taking estrogen supplements.  -2/13/2024 cervical spine x-ray negative    -2/15/2024 [facility] medical oncology follow-up: Patient has bilateral breast cancers as outlined on routine screening mammogram without any other symptoms.  After her diagnosis, she has had muscle tension aches in the left shoulder which she has a tens unit and recent injection of steroids by her primary care.  This is distinctly in the muscle she says and not in her bones.2/13/2024 cervical spine x-ray has C7 obscured by overlapping bone and soft tissues but otherwise unremarkable. Recent CBC and CMP had normal bone enzymes and was otherwise unremarkable.  Hence I would not make much out of the neck pain in the way of concern for metastatic disease unless this worsens.   She is under a lot of psychological strain from this diagnosis and is quite anxious and I will refer her to Ashlee Nayak are oncology psychiatrist and we will get our breast cancer navigator working with her.  The 7 mm right breast tumor is grade 2  % 3+, W[place] 50% 3+, HER2/nabila 0+.  The left breast is clinically 6 mm grade 3 invasive ductal ER negative W[place] weakly positive HER2/nabila 1 negative essentially triple negative.  We will get MRI of her breasts and if the lesion in the left breast turns out to be larger than 1 cm mammographically or is node positive then we would give neoadjuvant therapy.  If not, both for the 7 mm right breast and the 8 mm left breast tumor I would consider primary resection.  She has just started a job with [employer] in [place] and her insurance changes to [employer] [facility] [employer] in 2 weeks and she wants her surgery done in the [facility] facility for insurance reasons.  I will discuss this with Dr. Ashton (he was scrubbed when I called today) whose skills I recommended to her but we will make appropriate  referrals per her request.  With her triple negativity she will also get genetic counseling.  Absent any other somatic complaints and assuming her neck continues to improve I would do no additional staging imaging in the absence of such symptoms.  We will also present her at our multidisciplinary tumor board once the MRI is completed for final decision making.She is committed to bilateral mammograms regardless of the results of the MRI or genetic testing.  Genetic testing is done on all triple negatives.She does have a history of gastric sleeve but still needs to lose weight.  She also carries a diagnosis of COPD and I am not sure who has managed that and Dr. QUAN may need her cleared by primary care/pulmonary before surgery but I will leave it to him.  She is not oxygen requiring.    -2/21/2024 bilateral breast MRI:  Right breast 12:00, 8 cm from nipple, 1.4 cm enhancing mass consistent with biopsy and no additional right breast lesions.  Left breast 2:00 5 cm from nipple is 8 mm masslike enhancement with central signal void artifact corresponding to biopsy site.  No additional suspicious sites in the left breast.  No internal mammary nor axillary salazar involvement on either side    -3/1/2024 [facility] medical oncology follow-up: We reviewed her MRI at multidisciplinary tumor board.  For the right breast cancer she would have surgery primarily followed by adjuvant hormone blockade but would not do Oncotype as, for the left breast for which we plan surgery upfront for the 8 mm size of tumor triple negative she will need chemo regardless.  If the left breast lesion ends up less than 1 cm node-negative then we would give her Taxotere Cytoxan x 4.    If the left breast lesion is over a centimeter but less than 2 cm, then I would add Adriamycin.  If the left breast lesion is over 2 cm pathologically or node positive, then I would add carboplatin and Keytruda.  For the right breast cancer I would give her at least 5 and, if  she is tolerating, 10 years of hormone blockade.  She had gone 1 year without menses but just started spotting.  I contacted Dr. Ramirez who will see her in [facility] office today for pelvic exam and to get hormone levels and to get ultrasound scheduled.  She has COPD without pulmonary function since prior to her surgery for bariatric surgery in 2019.  She smoked until November.  She is not requiring oxygen and uses her inhalers efficiently without major symptoms.  I spoke with Dr. QUAN and he is comfortable with proceeding without pulmonary function tests and he will get preoperative ABG.  I will see her back in about a month which will give her time to hopefully have healed from her surgery and we can then make plans for adjuvant therapy based upon the above algorithm.  She will need a port but Dr. QUAN does not want to place this during the time of surgery but he will make arrangements to do that postoperatively.    -3/21/2024 evacuation left mastectomy hematomaWith bilateral skin sparing mastectomies and bilateral sentinel node injection and biopsies.  Left breast mastectomy pathology shows no residual carcinoma and 1 benign sentinel node.  Pathologic T1 a N0 MA weakly positive essentially triple negative  Right breast mastectomy pathology shows 1.1 cm grade 3 invasive ductal carcinoma, 2 benign sentinel nodes pathologic T1c N0 ER/MA positive HER2/nabila negative    -4/2/2024 [facility] medical oncology follow-up: I reviewed the above bilateral mastectomy pathology reports with her.  Wounds are healing well though still with significant healing yet to do and still has drains in place and is due to see Dr. QUAN tomorrow.  Her left breast had no residual tumor and no lymph node involvement for a pathological T1a N0 weakly MA positive essentially triple negative breast cancer for which I have placed orders for Taxotere Cytoxan x 4.  For her right breast mastectomy which is healing well, she had a 1.1 cm grade 3 invasive  ductal carcinoma with 2 benign sentinel nodes pathological T1c N0 ER and OK positive HER2/nabila negative for which I will treat her with Arimidex x 10 years following her chemotherapy.  We will get her to Dr. QUAN for port placement and she will have chemo preparation visit in our [place] office and we will start treatment in 2 weeks assuming Dr. QUAN has cleared her surgically.  No need for radiation given bilateral mastectomies.  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     Malignant neoplasm of overlapping sites of both breasts in female, estrogen receptor positive   4/17/2024 -  Chemotherapy    OP BREAST TC DOCEtaxel / Cyclophosphamide     Malignant neoplasm of overlapping sites of right female breast   3/21/2024 Initial Diagnosis    Malignant neoplasm of overlapping sites of right female breast     4/17/2024 -  Chemotherapy    OP BREAST TC DOCEtaxel / Cyclophosphamide         HISTORY OF PRESENT ILLNESS:  The patient is a 48 y.o. female, here for follow up on management of bilateral breast cancer.  Did well with surgery but is significantly fatigued.    Past Medical History:   Diagnosis Date    Asthma     Breast cancer     COPD (chronic obstructive pulmonary disease)     Depression     history     Past Surgical History:   Procedure Laterality Date    ENDOSCOPY      EGD x2    GASTRECTOMY      SLEEVE    HEMATOMA EVACUATION TRUNK Bilateral 3/21/2024    Procedure: HEMATOMA EVACUATION TRUNK;  Surgeon: Phuong Loredo MD;  Location: [facility] OR;  Service: General;  Laterality: Bilateral;    MASTECTOMY WITH SENTINEL NODE BIOPSY AND AXILLARY NODE DISSECTION Bilateral 3/21/2024    Procedure: BREAST MASTECTOMY WITH SENTINEL NODE BIOPSY BILATERAL;  Surgeon: Phuong Loredo MD;  Location: [facility] OR;  Service: General;  Laterality: Bilateral;       No Known Allergies    Family History and Social History reviewed and changed as necessary    REVIEW OF SYSTEM:   Other than  "fatigue no somatic complaints    PHYSICAL EXAM:  No jaundice icterus or pallor.  No respiratory distress.  Bilateral mastectomy scars healing and drainage tube still in place.  Mild erythema around the drain tubes.    Vitals:    04/02/24 0921   BP: 140/67   Pulse: 78   Resp: 18   Temp: 97.8 °F (36.6 °C)   SpO2: 95%   Weight: 91.6 kg (202 lb)   Height: 163.8 cm (64.5\")     Vitals:    04/02/24 0921   PainSc:   2   PainLoc: Chest  Comment: sore post surgery          ECOG score: 0           Vitals reviewed.    ECOG: (0) Fully Active - Able to Carry On All Pre-disease Performance Without Restriction    Lab Results   Component Value Date    HGB 7.0 (L) 03/23/2024    HCT 23.3 (L) 03/23/2024    MCV 86.7 03/23/2024     03/23/2024    WBC 10.27 03/23/2024    NEUTROABS 5.8 12/14/2023    LYMPHSABS 1.3 12/14/2023    MONOSABS 0.5 12/14/2023    EOSABS 0.1 12/14/2023    BASOSABS 0.0 12/14/2023       Lab Results   Component Value Date    GLUCOSE 113 (H) 03/22/2024    BUN 13 03/22/2024    CREATININE 1.04 (H) 03/22/2024     (L) 03/22/2024    K 4.9 03/22/2024     03/22/2024    CO2 24.0 03/22/2024    CALCIUM 8.2 (L) 03/22/2024    PROTEINTOT 7.6 03/11/2024    ALBUMIN 4.2 03/11/2024    BILITOT 0.4 03/11/2024    ALKPHOS 96 03/11/2024    AST 16 03/11/2024    ALT 12 03/11/2024             ASSESSMENT & PLAN:  1.  Bilateral breast cancer:  Right side invasive ductal clinical T1c N0, 1.4 centimeter, ER positive MN positive HER2 negative, grade 2.  Pathologic 1.1 cm T1c N0 on mastectomy  Left side invasive ductal clinical T1b N0, 8 MM, ER negative, MN weakly positive, HER2/nabila negative, essentially triple negative grade 3.  Pathologic T1 a N0 with no residual tumor on mastectomy  Initial bilateral mastectomy recommended.    2.  COPD    Oncology history timeline:  -1/11/2024 bilateral screening mammogram [facility] imaging showed asymmetry right breast " additional views needed with mass in left breast requiring additional imaging  -2/1/2024 bilateral diagnostic mammogram [facility] showed persistent focal asymmetry right 12:00 7 cm from the nipple 7 mm hypoechoic mass indistinct with irregular margins ultrasound-guided biopsy recommended.  Left breast showed 8 mm 3:00 oval mass 10 cm from the nipple that on the ultrasound was 6 x 5 x 5 mm.  Incidental 4 mm 2:00 hypoechoic mass with internal vascularity 5 cm from the nipple for which ultrasound-guided biopsy recommended  -2/5/2024 right ultrasound-guided core biopsy 7 mm mass with HydroMARK T4 shaped tissue marker placed at the biopsy site.  Left ultrasound breast core biopsy 3:00 6 mm mass and 2:00 4 mm mass with HydroMARK T3 and T4 respectively.  Right breast 12:00 lesion grade 2 invasive ductal Jimenez-Cui 6 out of 9, 4 mm.  % 3+, GA 50% 3+.  Also intermediate grade ductal carcinoma in situ  Left breast 3:00 invasive ductal carcinoma grade 3 Bloom-Cui 9 out of 9, 5-6 mm, ER 0%, GA 25% 1+, HER2/nabila negative 1+  Left breast 2:00 core biopsy fibroadenoma  -2/12/2024 office note Dr. Gregorio Ashton indicates patient with newly diagnosed bilateral breast cancer.  Screening mammogram showed 2 abnormalities in the left breast and 1 in the right.    No personal or family history of breast cancer.  Has hot flashes but not taking estrogen supplements.  -2/13/2024 cervical spine x-ray negative    -2/15/2024 [facility] medical oncology follow-up: Patient has bilateral breast cancers as outlined on routine screening mammogram without any other symptoms.  After her diagnosis, she has had muscle tension aches in the left shoulder which she has a tens unit and recent injection of steroids by her primary care.  This is distinctly in the muscle she says and not in her bones.2/13/2024 cervical spine x-ray has C7 obscured by overlapping bone and soft tissues but otherwise unremarkable. Recent CBC and CMP had  normal bone enzymes and was otherwise unremarkable.  Hence I would not make much out of the neck pain in the way of concern for metastatic disease unless this worsens.   She is under a lot of psychological strain from this diagnosis and is quite anxious and I will refer her to Ashlee Nayak are oncology psychiatrist and we will get our breast cancer navigator working with her.  The 7 mm right breast tumor is grade 2  % 3+, NV 50% 3+, HER2/nabila 0+.  The left breast is clinically 6 mm grade 3 invasive ductal ER negative NV weakly positive HER2/nabila 1 negative essentially triple negative.  We will get MRI of her breasts and if the lesion in the left breast turns out to be larger than 1 cm mammographically or is node positive then we would give neoadjuvant therapy.  If not, both for the 7 mm right breast and the 8 mm left breast tumor I would consider primary resection.  She has just started a job with [employer] in [place] and her insurance changes to [employer] in 2 weeks and she wants her surgery done in the [facility] facility for insurance reasons.  I will discuss this with Dr. Ashton (he was scrubbed when I called today) whose skills I recommended to her but we will make appropriate referrals per her request.  With her triple negativity she will also get genetic counseling.  Absent any other somatic complaints and assuming her neck continues to improve I would do no additional staging imaging in the absence of such symptoms.  We will also present her at our multidisciplinary tumor board once the MRI is completed for final decision making.She is committed to bilateral mammograms regardless of the results of the MRI or genetic testing.  Genetic testing is done on all triple negatives.She does have a history of gastric sleeve but still needs to lose weight.  She also carries a diagnosis of COPD and I am not sure who has managed that and Dr. QUAN may need her cleared by primary care/pulmonary before surgery but I will  leave it to him.  She is not oxygen requiring.    -2/21/2024 bilateral breast MRI:  Right breast 12:00, 8 cm from nipple, 1.4 cm enhancing mass consistent with biopsy and no additional right breast lesions.  Left breast 2:00 5 cm from nipple is 8 mm masslike enhancement with central signal void artifact corresponding to biopsy site.  No additional suspicious sites in the left breast.  No internal mammary nor axillary salazar involvement on either side    -3/1/2024 [facility] oncology follow-up: We reviewed her MRI at multidisciplinary tumor board.  For the right breast cancer she would have surgery primarily followed by adjuvant hormone blockade but would not do Oncotype as, for the left breast for which we plan surgery upfront for the 8 mm size of tumor triple negative she will need chemo regardless.  If the left breast lesion ends up less than 1 cm node-negative then we would give her Taxotere Cytoxan x 4.    If the left breast lesion is over a centimeter but less than 2 cm, then I would add Adriamycin.  If the left breast lesion is over 2 cm pathologically or node positive, then I would add carboplatin and Keytruda.  For the right breast cancer I would give her at least 5 and, if she is tolerating, 10 years of hormone blockade.  She had gone 1 year without menses but just started spotting.  I contacted Dr. Ramirez who will see her in [facility] office today for pelvic exam and to get hormone levels and to get ultrasound scheduled.  She has COPD without pulmonary function since prior to her surgery for bariatric surgery in 2019.  She smoked until November.  She is not requiring oxygen and uses her inhalers efficiently without major symptoms.  I spoke with Dr. QUAN and he is comfortable with proceeding without pulmonary function tests and he will get preoperative ABG.  I will see her back in about a month which will give her time to hopefully have healed from her surgery and we can then make plans for adjuvant  therapy based upon the above algorithm.  She will need a port but Dr. QUAN does not want to place this during the time of surgery but he will make arrangements to do that postoperatively.    -3/21/2024 evacuation left mastectomy hematomaWith bilateral skin sparing mastectomies and bilateral sentinel node injection and biopsies.  Left breast mastectomy pathology shows no residual carcinoma and 1 benign sentinel node.  Pathologic T1 a N0 TN weakly positive essentially triple negative  Right breast mastectomy pathology shows 1.1 cm grade 3 invasive ductal carcinoma, 2 benign sentinel nodes pathologic T1c N0 ER/TN positive HER2/nabila negative    -4/2/2024 [facility] medical oncology follow-up: I reviewed the above bilateral mastectomy pathology reports with her.  Wounds are healing well though still with significant healing yet to do and still has drains in place and is due to see Dr. QUAN tomorrow.  Her left breast had no residual tumor and no lymph node involvement for a pathological T1a N0 weakly TN positive essentially triple negative breast cancer for which I have placed orders for Taxotere Cytoxan x 4.  For her right breast mastectomy which is healing well, she had a 1.1 cm grade 3 invasive ductal carcinoma with 2 benign sentinel nodes pathological T1c N0 ER and TN positive HER2/nabila negative for which I will treat her with Arimidex x 10 years following her chemotherapy.  We will get her to Dr. QUAN for port placement and she will have chemo preparation visit in our [place] office and we will start treatment in 2 weeks assuming Dr. QUAN has cleared her surgically.  No need for radiation given bilateral mastectomies.  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    Total time of care today inclusive of time spent today prior to her arrival reviewing interval mastectomy pathology reports and interviewing her as to signs or symptoms of her disease and management thereof and the treatment  algorithm for the 2 different breast cancers with 2 different treatment algorithms superimposed as outlined above and after visit instituting this plan took 55 minutes of patient care time throughout the day today.  Corky Youngblood MD    04/02/2024

## 2024-04-02 NOTE — LETTER
April 2, 2024       No Recipients    Patient: Ashlee Marte   YOB: 1975   Date of Visit: 4/2/2024     Dear SHAVON Lynch:       Thank you for referring Ashlee Marte to me for evaluation. Below are the relevant portions of my assessment and plan of care.    If you have questions, please do not hesitate to call me. I look forward to following Ashlee along with you.         Sincerely,        Corky Youngblood MD        CC:   No Recipients    Corky Youngblood MD  04/02/24 1003  Sign when Signing Visit  CHIEF COMPLAINT: Fatigue and some discomfort at wound healing    Problem List:  Oncology/Hematology History Overview Note   1.  Bilateral breast cancer:  Right side invasive ductal clinical T1c N0, 1.4 centimeter, ER positive PA positive HER2 negative, grade 2.  Pathologic 1.1 cm T1c N0 on mastectomy  Left side invasive ductal clinical T1b N0, 8 MM, ER negative, PA weakly positive, HER2/nabila negative, essentially triple negative grade 3.  Pathologic T1 a N0 with no residual tumor on mastectomy  Initial bilateral mastectomy recommended.    2.  COPD    Oncology history timeline:  -1/11/2024 bilateral screening mammogram [facility] read [facility] imaging showed asymmetry right breast additional views needed with mass in left breast requiring additional imaging  -2/1/2024 bilateral diagnostic mammogram [place] [facility] showed persistent focal asymmetry right 12:00 7 cm from the nipple 7 mm hypoechoic mass indistinct with irregular margins ultrasound-guided biopsy recommended.  Left breast showed 8 mm 3:00 oval mass 10 cm from the nipple that on the ultrasound was 6 x 5 x 5 mm.  Incidental 4 mm 2:00 hypoechoic mass with internal vascularity 5 cm from the nipple for which ultrasound-guided biopsy recommended  -2/5/2024 right ultrasound-guided core biopsy 7 mm mass with HydroMARK T4 shaped tissue marker placed at the biopsy site.  Left ultrasound breast  core biopsy 3:00 6 mm mass and 2:00 4 mm mass with HydroMARK T3 and T4 respectively.  Right breast 12:00 lesion grade 2 invasive ductal Jimenez-Cui 6 out of 9, 4 mm.  % 3+, [place] 50% 3+.  Also intermediate grade ductal carcinoma in situ  Left breast 3:00 invasive ductal carcinoma grade 3 Bloom-Cui 9 out of 9, 5-6 mm, ER 0%, [place] 25% 1+, HER2/nabila negative 1+  Left breast 2:00 core biopsy fibroadenoma  -2/12/2024 office note Dr. Gregorio Ashton indicates patient with newly diagnosed bilateral breast cancer.  Screening mammogram showed 2 abnormalities in the left breast and 1 in the right.    No personal or family history of breast cancer.  Has hot flashes but not taking estrogen supplements.  -2/13/2024 cervical spine x-ray negative    -2/15/2024 [facility] medical oncology follow-up: Patient has bilateral breast cancers as outlined on routine screening mammogram without any other symptoms.  After her diagnosis, she has had muscle tension aches in the left shoulder which she has a tens unit and recent injection of steroids by her primary care.  This is distinctly in the muscle she says and not in her bones.2/13/2024 cervical spine x-ray has C7 obscured by overlapping bone and soft tissues but otherwise unremarkable. Recent CBC and CMP had normal bone enzymes and was otherwise unremarkable.  Hence I would not make much out of the neck pain in the way of concern for metastatic disease unless this worsens.   She is under a lot of psychological strain from this diagnosis and is quite anxious and I will refer her to Ashlee Nayak are oncology psychiatrist and we will get our breast cancer navigator working with her.  The 7 mm right breast tumor is grade 2  % 3+, [place] 50% 3+, HER2/nabila 0+.  The left breast is clinically 6 mm grade 3 invasive ductal ER negative [place] weakly positive HER2/nabila 1 negative essentially triple negative.  We will get MRI of her breasts and if the lesion in the left breast turns out to be larger  than 1 cm mammographically or is node positive then we would give neoadjuvant therapy.  If not, both for the 7 mm right breast and the 8 mm left breast tumor I would consider primary resection.  She has just started a job with [employer] in [place] and her insurance changes to [facility] in 2 weeks and she wants her surgery done in the [facility] facility for insurance reasons.  I will discuss this with Dr. Ashton (he was scrubbed when I called today) whose skills I recommended to her but we will make appropriate referrals per her request.  With her triple negativity she will also get genetic counseling.  Absent any other somatic complaints and assuming her neck continues to improve I would do no additional staging imaging in the absence of such symptoms.  We will also present her at our multidisciplinary tumor board once the MRI is completed for final decision making.She is committed to bilateral mammograms regardless of the results of the MRI or genetic testing.  Genetic testing is done on all triple negatives.She does have a history of gastric sleeve but still needs to lose weight.  She also carries a diagnosis of COPD and I am not sure who has managed that and Dr. QUAN may need her cleared by primary care/pulmonary before surgery but I will leave it to him.  She is not oxygen requiring.    -2/21/2024 bilateral breast MRI:  Right breast 12:00, 8 cm from nipple, 1.4 cm enhancing mass consistent with biopsy and no additional right breast lesions.  Left breast 2:00 5 cm from nipple is 8 mm masslike enhancement with central signal void artifact corresponding to biopsy site.  No additional suspicious sites in the left breast.  No internal mammary nor axillary salazar involvement on either side    -3/1/2024 [facility] medical oncology follow-up: We reviewed her MRI at multidisciplinary tumor board.  For the right breast cancer she would have surgery primarily followed by adjuvant hormone blockade but would not do Oncotype as,  for the left breast for which we plan surgery upfront for the 8 mm size of tumor triple negative she will need chemo regardless.  If the left breast lesion ends up less than 1 cm node-negative then we would give her Taxotere Cytoxan x 4.    If the left breast lesion is over a centimeter but less than 2 cm, then I would add Adriamycin.  If the left breast lesion is over 2 cm pathologically or node positive, then I would add carboplatin and Keytruda.  For the right breast cancer I would give her at least 5 and, if she is tolerating, 10 years of hormone blockade.  She had gone 1 year without menses but just started spotting.  I contacted Dr. Ramirez who will see her in [facility] office today for pelvic exam and to get hormone levels and to get ultrasound scheduled.  She has COPD without pulmonary function since prior to her surgery for bariatric surgery in 2019.  She smoked until November.  She is not requiring oxygen and uses her inhalers efficiently without major symptoms.  I spoke with Dr. QUAN and he is comfortable with proceeding without pulmonary function tests and he will get preoperative ABG.  I will see her back in about a month which will give her time to hopefully have healed from her surgery and we can then make plans for adjuvant therapy based upon the above algorithm.  She will need a port but Dr. QUAN does not want to place this during the time of surgery but he will make arrangements to do that postoperatively.    -3/21/2024 evacuation left mastectomy hematomaWith bilateral skin sparing mastectomies and bilateral sentinel node injection and biopsies.  Left breast mastectomy pathology shows no residual carcinoma and 1 benign sentinel node.  Pathologic T1 a N0 LA weakly positive essentially triple negative  Right breast mastectomy pathology shows 1.1 cm grade 3 invasive ductal carcinoma, 2 benign sentinel nodes pathologic T1c N0 ER/LA positive HER2/nabila negative    -4/2/2024 [facility] medical oncology  follow-up: I reviewed the above bilateral mastectomy pathology reports with her.  Wounds are healing well though still with significant healing yet to do and still has drains in place and is due to see Dr. QUAN tomorrow.  Her left breast had no residual tumor and no lymph node involvement for a pathological T1a N0 weakly NJ positive essentially triple negative breast cancer for which I have placed orders for Taxotere Cytoxan x 4.  For her right breast mastectomy which is healing well, she had a 1.1 cm grade 3 invasive ductal carcinoma with 2 benign sentinel nodes pathological T1c N0 ER and NJ positive HER2/nabila negative for which I will treat her with Arimidex x 10 years following her chemotherapy.  We will get her to Dr. QUAN for port placement and she will have chemo preparation visit in our [place] office and we will start treatment in 2 weeks assuming Dr. QUAN has cleared her surgically.  No need for radiation given bilateral mastectomies.  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     Malignant neoplasm of overlapping sites of both breasts in female, estrogen receptor positive   4/17/2024 -  Chemotherapy    OP BREAST TC DOCEtaxel / Cyclophosphamide     Malignant neoplasm of overlapping sites of right female breast   3/21/2024 Initial Diagnosis    Malignant neoplasm of overlapping sites of right female breast     4/17/2024 -  Chemotherapy    OP BREAST TC DOCEtaxel / Cyclophosphamide         HISTORY OF PRESENT ILLNESS:  The patient is a 48 y.o. female, here for follow up on management of bilateral breast cancer.  Did well with surgery but is significantly fatigued.    Past Medical History:   Diagnosis Date   • Asthma    • Breast cancer    • COPD (chronic obstructive pulmonary disease)    • Depression     history     Past Surgical History:   Procedure Laterality Date   • ENDOSCOPY      EGD x2   • GASTRECTOMY      SLEEVE   • HEMATOMA EVACUATION TRUNK Bilateral 3/21/2024     "Procedure: HEMATOMA EVACUATION TRUNK;  Surgeon: Phuong Loredo MD;  Location:  [facility];  Service: General;  Laterality: Bilateral;   • MASTECTOMY WITH SENTINEL NODE BIOPSY AND AXILLARY NODE DISSECTION Bilateral 3/21/2024    Procedure: BREAST MASTECTOMY WITH SENTINEL NODE BIOPSY BILATERAL;  Surgeon: Phuong Loredo MD;  Location:  [facility];  Service: General;  Laterality: Bilateral;       No Known Allergies    Family History and Social History reviewed and changed as necessary    REVIEW OF SYSTEM:   Other than fatigue no somatic complaints    PHYSICAL EXAM:  No jaundice icterus or pallor.  No respiratory distress.  Bilateral mastectomy scars healing and drainage tube still in place.  Mild erythema around the drain tubes.    Vitals:    04/02/24 0921   BP: 140/67   Pulse: 78   Resp: 18   Temp: 97.8 °F (36.6 °C)   SpO2: 95%   Weight: 91.6 kg (202 lb)   Height: 163.8 cm (64.5\")     Vitals:    04/02/24 0921   PainSc:   2   PainLoc: Chest  Comment: sore post surgery          ECOG score: 0           Vitals reviewed.    ECOG: (0) Fully Active - Able to Carry On All Pre-disease Performance Without Restriction    Lab Results   Component Value Date    HGB 7.0 (L) 03/23/2024    HCT 23.3 (L) 03/23/2024    MCV 86.7 03/23/2024     03/23/2024    WBC 10.27 03/23/2024    NEUTROABS 5.8 12/14/2023    LYMPHSABS 1.3 12/14/2023    MONOSABS 0.5 12/14/2023    EOSABS 0.1 12/14/2023    BASOSABS 0.0 12/14/2023       Lab Results   Component Value Date    GLUCOSE 113 (H) 03/22/2024    BUN 13 03/22/2024    CREATININE 1.04 (H) 03/22/2024     (L) 03/22/2024    K 4.9 03/22/2024     03/22/2024    CO2 24.0 03/22/2024    CALCIUM 8.2 (L) 03/22/2024    PROTEINTOT 7.6 03/11/2024    ALBUMIN 4.2 03/11/2024    BILITOT 0.4 03/11/2024    ALKPHOS 96 03/11/2024    AST 16 03/11/2024    ALT 12 03/11/2024             ASSESSMENT & PLAN:  1.  Bilateral breast cancer:  Right side invasive ductal clinical T1c N0, 1.4 centimeter, ER " positive [place] positive HER2 negative, grade 2.  Pathologic 1.1 cm T1c N0 on mastectomy  Left side invasive ductal clinical T1b N0, 8 MM, ER negative, NY weakly positive, HER2/nabila negative, essentially triple negative grade 3.  Pathologic T1 a N0 with no residual tumor on mastectomy  Initial bilateral mastectomy recommended.    2.  COPD    Oncology history timeline:  -1/11/2024 bilateral screening mammogram [facility] read [facility] imaging showed asymmetry right breast additional views needed with mass in left breast requiring additional imaging  -2/1/2024 bilateral diagnostic mammogram [facility] showed persistent focal asymmetry right 12:00 7 cm from the nipple 7 mm hypoechoic mass indistinct with irregular margins ultrasound-guided biopsy recommended.  Left breast showed 8 mm 3:00 oval mass 10 cm from the nipple that on the ultrasound was 6 x 5 x 5 mm.  Incidental 4 mm 2:00 hypoechoic mass with internal vascularity 5 cm from the nipple for which ultrasound-guided biopsy recommended  -2/5/2024 right ultrasound-guided core biopsy 7 mm mass with HydroMARK T4 shaped tissue marker placed at the biopsy site.  Left ultrasound breast core biopsy 3:00 6 mm mass and 2:00 4 mm mass with HydroMARK T3 and T4 respectively.  Right breast 12:00 lesion grade 2 invasive ductal Jimenez-Cui 6 out of 9, 4 mm.  % 3+, [place] 50% 3+.  Also intermediate grade ductal carcinoma in situ  Left breast 3:00 invasive ductal carcinoma grade 3 Bloom-Cui 9 out of 9, 5-6 mm, ER 0%, [place] 25% 1+, HER2/nabila negative 1+  Left breast 2:00 core biopsy fibroadenoma  -2/12/2024 office note Dr. Gregorio Ashton indicates patient with newly diagnosed bilateral breast cancer.  Screening mammogram showed 2 abnormalities in the left breast and 1 in the right.    No personal or family history of breast cancer.  Has hot flashes but not taking estrogen supplements.  -2/13/2024 cervical spine x-ray  negative    -2/15/2024 [facility] medical oncology follow-up: Patient has bilateral breast cancers as outlined on routine screening mammogram without any other symptoms.  After her diagnosis, she has had muscle tension aches in the left shoulder which she has a tens unit and recent injection of steroids by her primary care.  This is distinctly in the muscle she says and not in her bones.2/13/2024 cervical spine x-ray has C7 obscured by overlapping bone and soft tissues but otherwise unremarkable. Recent CBC and CMP had normal bone enzymes and was otherwise unremarkable.  Hence I would not make much out of the neck pain in the way of concern for metastatic disease unless this worsens.   She is under a lot of psychological strain from this diagnosis and is quite anxious and I will refer her to Ashlee Nayak are oncology psychiatrist and we will get our breast cancer navigator working with her.  The 7 mm right breast tumor is grade 2  % 3+, IA 50% 3+, HER2/nabila 0+.  The left breast is clinically 6 mm grade 3 invasive ductal ER negative IA weakly positive HER2/nabila 1 negative essentially triple negative.  We will get MRI of her breasts and if the lesion in the left breast turns out to be larger than 1 cm mammographically or is node positive then we would give neoadjuvant therapy.  If not, both for the 7 mm right breast and the 8 mm left breast tumor I would consider primary resection.  She has just started a job with [employer] in [place] and her insurance changes to [facility] in 2 weeks and she wants her surgery done in the [facility] facility for insurance reasons.  I will discuss this with Dr. Ashton (he was scrubbed when I called today) whose skills I recommended to her but we will make appropriate referrals per her request.  With her triple negativity she will also get genetic counseling.  Absent any other somatic complaints and assuming her neck continues to improve I would do no additional staging imaging in the  absence of such symptoms.  We will also present her at our multidisciplinary tumor board once the MRI is completed for final decision making.She is committed to bilateral mammograms regardless of the results of the MRI or genetic testing.  Genetic testing is done on all triple negatives.She does have a history of gastric sleeve but still needs to lose weight.  She also carries a diagnosis of COPD and I am not sure who has managed that and Dr. QUAN may need her cleared by primary care/pulmonary before surgery but I will leave it to him.  She is not oxygen requiring.    -2/21/2024 bilateral breast MRI:  Right breast 12:00, 8 cm from nipple, 1.4 cm enhancing mass consistent with biopsy and no additional right breast lesions.  Left breast 2:00 5 cm from nipple is 8 mm masslike enhancement with central signal void artifact corresponding to biopsy site.  No additional suspicious sites in the left breast.  No internal mammary nor axillary salazar involvement on either side    -3/1/2024 [facility] oncology follow-up: We reviewed her MRI at multidisciplinary tumor board.  For the right breast cancer she would have surgery primarily followed by adjuvant hormone blockade but would not do Oncotype as, for the left breast for which we plan surgery upfront for the 8 mm size of tumor triple negative she will need chemo regardless.  If the left breast lesion ends up less than 1 cm node-negative then we would give her Taxotere Cytoxan x 4.    If the left breast lesion is over a centimeter but less than 2 cm, then I would add Adriamycin.  If the left breast lesion is over 2 cm pathologically or node positive, then I would add carboplatin and Keytruda.  For the right breast cancer I would give her at least 5 and, if she is tolerating, 10 years of hormone blockade.  She had gone 1 year without menses but just started spotting.  I contacted Dr. Ramirez who will see her in [facility] office today for pelvic exam and to get hormone  levels and to get ultrasound scheduled.  She has COPD without pulmonary function since prior to her surgery for bariatric surgery in 2019.  She smoked until November.  She is not requiring oxygen and uses her inhalers efficiently without major symptoms.  I spoke with Dr. QUAN and he is comfortable with proceeding without pulmonary function tests and he will get preoperative ABG.  I will see her back in about a month which will give her time to hopefully have healed from her surgery and we can then make plans for adjuvant therapy based upon the above algorithm.  She will need a port but Dr. QUAN does not want to place this during the time of surgery but he will make arrangements to do that postoperatively.    -3/21/2024 evacuation left mastectomy hematomaWith bilateral skin sparing mastectomies and bilateral sentinel node injection and biopsies.  Left breast mastectomy pathology shows no residual carcinoma and 1 benign sentinel node.  Pathologic T1 a N0 [place] weakly positive essentially triple negative  Right breast mastectomy pathology shows 1.1 cm grade 3 invasive ductal carcinoma, 2 benign sentinel nodes pathologic T1c N0 ER/WY positive HER2/nabila negative    -4/2/2024 [facility] medical oncology follow-up: I reviewed the above bilateral mastectomy pathology reports with her.  Wounds are healing well though still with significant healing yet to do and still has drains in place and is due to see Dr. QUAN tomorrow.  Her left breast had no residual tumor and no lymph node involvement for a pathological T1a N0 weakly W[place] positive essentially triple negative breast cancer for which I have placed orders for Taxotere Cytoxan x 4.  For her right breast mastectomy which is healing well, she had a 1.1 cm grade 3 invasive ductal carcinoma with 2 benign sentinel nodes pathological T1c N0 ER and W[place] positive HER2/nabila negative for which I will treat her with Arimidex x 10 years following her chemotherapy.  We will get her to Dr. QUAN for port  placement and she will have chemo preparation visit in our [place] office and we will start treatment in 2 weeks assuming Dr. QUAN has cleared her surgically.  No need for radiation given bilateral mastectomies.  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    Total time of care today inclusive of time spent today prior to her arrival reviewing interval mastectomy pathology reports and interviewing her as to signs or symptoms of her disease and management thereof and the treatment algorithm for the 2 different breast cancers with 2 different treatment algorithms superimposed as outlined above and after visit instituting this plan took 55 minutes of patient care time throughout the day today.  Corky Youngblood MD    04/02/2024

## 2024-04-03 ENCOUNTER — TELEPHONE (OUTPATIENT)
Dept: ONCOLOGY | Facility: CLINIC | Age: 49
End: 2024-04-03
Payer: COMMERCIAL

## 2024-04-03 ENCOUNTER — PATIENT OUTREACH (OUTPATIENT)
Dept: CASE MANAGEMENT | Facility: OTHER | Age: 49
End: 2024-04-03
Payer: COMMERCIAL

## 2024-04-03 NOTE — OUTREACH NOTE
AMBULATORY CASE MANAGEMENT NOTE    Name and Relationship of Patient/Support Person: Ashlee Marte - Self  Self    Patient Outreach    Patient reports she is not feeling well,complaints of waves of nausea without emesis, weakness, chills.  Patient request [id number] to contact surgeons office to reschedule tomorrow's appointment.  Reports continued drainage in PRASHANT drains. Dr. Loredo's contacted, appointment cancelled - will call her back tomorrow with new appointment date and time.  Also had appointment with Danni Campo tomorrow and would like that appointment moved as well. Message left for clinical pool. Patient denies diarrhea.  Education provided on BRAT diet today and ensuring proper hydration as well as signs and symptoms of infection requiring elevation of care reviewed with patient.  Patient agreeable to oncology case management service.    Education Documentation  Infection Signs/Symptoms, taught by Roslyn Singh, NIKITA at 4/3/2024 11:42 AM.  Learner: Patient  Readiness: Acceptance  Method: Explanation  Response: Verbalizes Understanding          Roslyn CRAWFORD  Ambulatory Case Management    4/3/2024, 11:43 EDT

## 2024-04-03 NOTE — TELEPHONE ENCOUNTER
----- Message from Roslyn Singh RN sent at 4/3/2024 11:54 AM EDT -----  Regarding: reschedule appointment  Patient experiencing flu-like symptoms, would like to reschedule tomorrow's appointment with Danni Campo. Thank you, Roslyn

## 2024-04-03 NOTE — TELEPHONE ENCOUNTER
Called the patient but no answer. Rescheduled the patient for 2pm on Tuesday, April 9th and left a vm of date and time.

## 2024-04-05 ENCOUNTER — APPOINTMENT (OUTPATIENT)
Dept: GENERAL RADIOLOGY | Facility: HOSPITAL | Age: 49
End: 2024-04-05
Payer: COMMERCIAL

## 2024-04-05 ENCOUNTER — APPOINTMENT (OUTPATIENT)
Dept: CT IMAGING | Facility: HOSPITAL | Age: 49
End: 2024-04-05
Payer: COMMERCIAL

## 2024-04-05 ENCOUNTER — HOSPITAL ENCOUNTER (EMERGENCY)
Facility: HOSPITAL | Age: 49
Discharge: HOME | End: 2024-04-05
Attending: EMERGENCY MEDICINE
Payer: COMMERCIAL

## 2024-04-05 VITALS
BODY MASS INDEX: 34.49 KG/M2 | RESPIRATION RATE: 18 BRPM | OXYGEN SATURATION: 98 % | WEIGHT: 202 LBS | HEART RATE: 101 BPM | DIASTOLIC BLOOD PRESSURE: 118 MMHG | SYSTOLIC BLOOD PRESSURE: 150 MMHG | HEIGHT: 64 IN | TEMPERATURE: 98.8 F

## 2024-04-05 DIAGNOSIS — N30.00 ACUTE CYSTITIS WITHOUT HEMATURIA: Primary | ICD-10-CM

## 2024-04-05 LAB
ALBUMIN SERPL-MCNC: 3.1 G/DL (ref 3.5–5.2)
ALBUMIN/GLOB SERPL: 1 G/DL
ALP SERPL-CCNC: 82 U/L (ref 39–117)
ALT SERPL W P-5'-P-CCNC: 10 U/L (ref 1–33)
ANION GAP SERPL CALCULATED.3IONS-SCNC: 12 MMOL/L (ref 5–15)
AST SERPL-CCNC: 31 U/L (ref 1–32)
BACTERIA UR QL AUTO: ABNORMAL /HPF
BASOPHILS # BLD AUTO: 0.05 10*3/MM3 (ref 0–0.2)
BASOPHILS NFR BLD AUTO: 0.4 % (ref 0–1.5)
BILIRUB SERPL-MCNC: 0.3 MG/DL (ref 0–1.2)
BILIRUB UR QL STRIP: ABNORMAL
BUN SERPL-MCNC: 8 MG/DL (ref 6–20)
BUN/CREAT SERPL: 9.3 (ref 7–25)
CALCIUM SPEC-SCNC: 8.4 MG/DL (ref 8.6–10.5)
CHLORIDE SERPL-SCNC: 94 MMOL/L (ref 98–107)
CLARITY UR: ABNORMAL
CO2 SERPL-SCNC: 25 MMOL/L (ref 22–29)
COLOR UR: ABNORMAL
CREAT SERPL-MCNC: 0.86 MG/DL (ref 0.57–1)
D-LACTATE SERPL-SCNC: 1.5 MMOL/L (ref 0.5–2)
DEPRECATED RDW RBC AUTO: 45 FL (ref 37–54)
EGFRCR SERPLBLD CKD-EPI 2021: 82.4 ML/MIN/1.73
EOSINOPHIL # BLD AUTO: 0.16 10*3/MM3 (ref 0–0.4)
EOSINOPHIL NFR BLD AUTO: 1.4 % (ref 0.3–6.2)
ERYTHROCYTE [DISTWIDTH] IN BLOOD BY AUTOMATED COUNT: 15.2 % (ref 12.3–15.4)
FLUAV RNA RESP QL NAA+PROBE: NOT DETECTED
FLUBV RNA RESP QL NAA+PROBE: NOT DETECTED
GLOBULIN UR ELPH-MCNC: 3.1 GM/DL
GLUCOSE SERPL-MCNC: 110 MG/DL (ref 65–99)
GLUCOSE UR STRIP-MCNC: ABNORMAL MG/DL
HCT VFR BLD AUTO: 31.9 % (ref 34–46.6)
HGB BLD-MCNC: 9.7 G/DL (ref 12–15.9)
HGB UR QL STRIP.AUTO: NEGATIVE
HOLD SPECIMEN: NORMAL
HOLD SPECIMEN: NORMAL
HYALINE CASTS UR QL AUTO: ABNORMAL /LPF
IMM GRANULOCYTES # BLD AUTO: 0.06 10*3/MM3 (ref 0–0.05)
IMM GRANULOCYTES NFR BLD AUTO: 0.5 % (ref 0–0.5)
KETONES UR QL STRIP: ABNORMAL
LEUKOCYTE ESTERASE UR QL STRIP.AUTO: ABNORMAL
LYMPHOCYTES # BLD AUTO: 1.51 10*3/MM3 (ref 0.7–3.1)
LYMPHOCYTES NFR BLD AUTO: 12.9 % (ref 19.6–45.3)
MAGNESIUM SERPL-MCNC: 2.1 MG/DL (ref 1.6–2.6)
MCH RBC QN AUTO: 25.3 PG (ref 26.6–33)
MCHC RBC AUTO-ENTMCNC: 30.4 G/DL (ref 31.5–35.7)
MCV RBC AUTO: 83.3 FL (ref 79–97)
MONOCYTES # BLD AUTO: 0.84 10*3/MM3 (ref 0.1–0.9)
MONOCYTES NFR BLD AUTO: 7.2 % (ref 5–12)
NEUTROPHILS NFR BLD AUTO: 77.6 % (ref 42.7–76)
NEUTROPHILS NFR BLD AUTO: 9.05 10*3/MM3 (ref 1.7–7)
NITRITE UR QL STRIP: NEGATIVE
NRBC BLD AUTO-RTO: 0 /100 WBC (ref 0–0.2)
PH UR STRIP.AUTO: 5.5 [PH] (ref 5–8)
PLATELET # BLD AUTO: 601 10*3/MM3 (ref 140–450)
PMV BLD AUTO: 9 FL (ref 6–12)
POTASSIUM SERPL-SCNC: 3.6 MMOL/L (ref 3.5–5.2)
PROCALCITONIN SERPL-MCNC: 0.11 NG/ML (ref 0–0.25)
PROT SERPL-MCNC: 6.2 G/DL (ref 6–8.5)
PROT UR QL STRIP: ABNORMAL
RBC # BLD AUTO: 3.83 10*6/MM3 (ref 3.77–5.28)
RBC # UR STRIP: ABNORMAL /HPF
REF LAB TEST METHOD: ABNORMAL
SARS-COV-2 RNA RESP QL NAA+PROBE: NOT DETECTED
SODIUM SERPL-SCNC: 131 MMOL/L (ref 136–145)
SP GR UR STRIP: 1.03 (ref 1–1.03)
SQUAMOUS #/AREA URNS HPF: ABNORMAL /HPF
TRANS CELLS #/AREA URNS HPF: ABNORMAL /HPF
TROPONIN T SERPL HS-MCNC: 10 NG/L
UROBILINOGEN UR QL STRIP: ABNORMAL
WBC # UR STRIP: ABNORMAL /HPF
WBC NRBC COR # BLD AUTO: 11.67 10*3/MM3 (ref 3.4–10.8)
WHOLE BLOOD HOLD COAG: NORMAL
WHOLE BLOOD HOLD SPECIMEN: NORMAL

## 2024-04-05 PROCEDURE — 84145 PROCALCITONIN (PCT): CPT | Performed by: PHYSICIAN ASSISTANT

## 2024-04-05 PROCEDURE — 83605 ASSAY OF LACTIC ACID: CPT | Performed by: PHYSICIAN ASSISTANT

## 2024-04-05 PROCEDURE — 80053 COMPREHEN METABOLIC PANEL: CPT | Performed by: EMERGENCY MEDICINE

## 2024-04-05 PROCEDURE — 93005 ELECTROCARDIOGRAM TRACING: CPT | Performed by: EMERGENCY MEDICINE

## 2024-04-05 PROCEDURE — 84484 ASSAY OF TROPONIN QUANT: CPT | Performed by: EMERGENCY MEDICINE

## 2024-04-05 PROCEDURE — 25810000003 SODIUM CHLORIDE 0.9 % SOLUTION: Performed by: PHYSICIAN ASSISTANT

## 2024-04-05 PROCEDURE — 87636 SARSCOV2 & INF A&B AMP PRB: CPT | Performed by: PHYSICIAN ASSISTANT

## 2024-04-05 PROCEDURE — 71045 X-RAY EXAM CHEST 1 VIEW: CPT

## 2024-04-05 PROCEDURE — 83735 ASSAY OF MAGNESIUM: CPT | Performed by: EMERGENCY MEDICINE

## 2024-04-05 PROCEDURE — 81001 URINALYSIS AUTO W/SCOPE: CPT | Performed by: EMERGENCY MEDICINE

## 2024-04-05 PROCEDURE — 96360 HYDRATION IV INFUSION INIT: CPT

## 2024-04-05 PROCEDURE — 93005 ELECTROCARDIOGRAM TRACING: CPT

## 2024-04-05 PROCEDURE — 87086 URINE CULTURE/COLONY COUNT: CPT | Performed by: PHYSICIAN ASSISTANT

## 2024-04-05 PROCEDURE — 71275 CT ANGIOGRAPHY CHEST: CPT

## 2024-04-05 PROCEDURE — 85025 COMPLETE CBC W/AUTO DIFF WBC: CPT | Performed by: EMERGENCY MEDICINE

## 2024-04-05 PROCEDURE — 99285 EMERGENCY DEPT VISIT HI MDM: CPT

## 2024-04-05 PROCEDURE — 25510000001 IOPAMIDOL PER 1 ML: Performed by: EMERGENCY MEDICINE

## 2024-04-05 RX ORDER — CEFUROXIME AXETIL 500 MG/1
500 TABLET ORAL 2 TIMES DAILY
Qty: 10 TABLET | Refills: 0 | Status: SHIPPED | OUTPATIENT
Start: 2024-04-05 | End: 2024-04-10

## 2024-04-05 RX ORDER — SODIUM CHLORIDE 0.9 % (FLUSH) 0.9 %
10 SYRINGE (ML) INJECTION AS NEEDED
Status: DISCONTINUED | OUTPATIENT
Start: 2024-04-05 | End: 2024-04-06 | Stop reason: HOSPADM

## 2024-04-05 RX ORDER — CEFUROXIME AXETIL 250 MG/1
500 TABLET ORAL ONCE
Status: COMPLETED | OUTPATIENT
Start: 2024-04-05 | End: 2024-04-05

## 2024-04-05 RX ORDER — CEFUROXIME AXETIL 500 MG/1
500 TABLET ORAL 2 TIMES DAILY
Qty: 10 TABLET | Refills: 0 | Status: SHIPPED | OUTPATIENT
Start: 2024-04-05 | End: 2024-04-05

## 2024-04-05 RX ADMIN — IOPAMIDOL 80 ML: 755 INJECTION, SOLUTION INTRAVENOUS at 21:19

## 2024-04-05 RX ADMIN — CEFUROXIME AXETIL 500 MG: 250 TABLET, FILM COATED ORAL at 22:10

## 2024-04-05 RX ADMIN — SODIUM CHLORIDE 1000 ML: 9 INJECTION, SOLUTION INTRAVENOUS at 20:29

## 2024-04-06 LAB
BACTERIA SPEC AEROBE CULT: NORMAL
HOLD SPECIMEN: NORMAL

## 2024-04-06 NOTE — ED PROVIDER NOTES
Subjective  History of Present Illness:    Chief Complaint: Weakness  History of Present Illness: 48-year-old female presents with weakness, she is status post bilateral sentinel lymph node injection, skin sparing mastectomies bilateral sentinel node biopsies on March 21, 2024.  She was recently diagnosed with bilateral breast cancer.  She states over the last 2 to 3 days she has become progressively more weak.  She states the weakness is more pronounced with any activity even walking short distances or with minimal movement.  Onset: Gradual onset  Duration: 2 to 3 days  Exacerbating / Alleviating factors: Recent diagnosis of breast cancer, status post bilateral mastectomies  Associated symptoms: Weakness with activity      Nurses Notes reviewed and agree, including vitals, allergies, social history and prior medical history.     REVIEW OF SYSTEMS: All systems reviewed and not pertinent unless noted.    Review of Systems   Neurological:  Positive for weakness.   All other systems reviewed and are negative.      Past Medical History:   Diagnosis Date    Asthma     Breast cancer     COPD (chronic obstructive pulmonary disease)     Depression     history       Allergies:    Patient has no known allergies.      Past Surgical History:   Procedure Laterality Date    ENDOSCOPY      EGD x2    GASTRECTOMY      SLEEVE    HEMATOMA EVACUATION TRUNK Bilateral 3/21/2024    Procedure: HEMATOMA EVACUATION TRUNK;  Surgeon: Phuong Loredo MD;  Location: [facility];  Service: General;  Laterality: Bilateral;    MASTECTOMY WITH SENTINEL NODE BIOPSY AND AXILLARY NODE DISSECTION Bilateral 3/21/2024    Procedure: BREAST MASTECTOMY WITH SENTINEL NODE BIOPSY BILATERAL;  Surgeon: Phuong Loredo MD;  Location: [facility];  Service: General;  Laterality: Bilateral;         Social History     Socioeconomic History    Marital status: Single   Tobacco Use    Smoking status: Every Day     Current packs/day: 0.00     Average packs/day:  "0.5 packs/day for 33.8 years (16.9 ttl pk-yrs)     Types: Cigarettes     Start date: 1990     Last attempt to quit: 2023     Years since quittin.4    Smokeless tobacco: Never   Vaping Use    Vaping status: Never Used   Substance and Sexual Activity    Alcohol use: Yes    Drug use: Not Currently    Sexual activity: Yes     Partners: Male         Family History   Problem Relation Age of Onset    Diabetes Other     COPD Other        Objective  Physical Exam:  BP (!) 150/118 (BP Location: Left leg, Patient Position: Sitting)   Pulse 101   Temp 98.8 °F (37.1 °C)   Resp 18   Ht 161.3 cm (63.5\")   Wt 91.6 kg (202 lb)   SpO2 98%   BMI 35.22 kg/m²      Physical Exam  Vitals and nursing note reviewed.   Constitutional:       Appearance: She is well-developed.   HENT:      Head: Normocephalic and atraumatic.      Mouth/Throat:      Mouth: Mucous membranes are moist.   Cardiovascular:      Rate and Rhythm: Normal rate and regular rhythm.   Pulmonary:      Effort: Pulmonary effort is normal.      Breath sounds: Normal breath sounds.   Abdominal:      Palpations: Abdomen is soft.   Musculoskeletal:         General: Normal range of motion.      Cervical back: Normal range of motion and neck supple.   Skin:     General: Skin is warm and dry.   Neurological:      Mental Status: She is alert and oriented to person, place, and time.      Deep Tendon Reflexes: Reflexes are normal and symmetric.           Procedures    ED Course:    ED Course as of 24 Bacteria, UA(!): 4+ [CS]    WBC, UA(!): 21-50 [CS]    RBC, UA(!): 11-20 [CS]    Squamous Epithelial Cells, UA(!): 31-50 [CS]    Will treat for uti and culture [CS]      ED Course User Index  [CS] Kyree Lawson Jr., PAAustinC       Lab Results (last 24 hours)       Procedure Component Value Units Date/Time    Urinalysis With Microscopic If Indicated (No Culture) - Urine, Clean Catch [[id number]]  (Abnormal) Collected: " 04/05/24 2027    Specimen: Urine, Clean Catch Updated: 04/05/24 2053     Color, UA Dark Yellow     Appearance, UA Turbid     pH, UA 5.5     Specific Gravity, UA 1.029     Glucose,  mg/dL (Trace)     Ketones, UA Trace     Bilirubin, UA Moderate (2+)     Blood, UA Negative     Protein,  mg/dL (2+)     Leuk Esterase, UA Small (1+)     Nitrite, UA Negative     Urobilinogen, UA 1.0 E.U./dL    Urinalysis, Microscopic Only - Urine, Clean Catch [[id number]]  (Abnormal) Collected: 04/05/24 2027    Specimen: Urine, Clean Catch Updated: 04/05/24 2102     RBC, UA 11-20 /HPF      WBC, UA 21-50 /HPF      Bacteria, UA 4+ /HPF      Squamous Epithelial Cells, UA 31-50 /HPF      Transitional Epithelial Cells, UA 3-6 /HPF      Hyaline Casts, UA       Unable to determine due to loaded field     /LPF     Methodology Manual Light Microscopy    Urine Culture - Urine, Urine, Clean Catch [[id number]] Collected: 04/05/24 2027    Specimen: Urine, Clean Catch Updated: 04/05/24 2152    CBC & Differential [[id number]]  (Abnormal) Collected: 04/05/24 2029    Specimen: Blood Updated: 04/05/24 2041    Narrative:      The following orders were created for panel order CBC & Differential.  Procedure                               Abnormality         Status                     ---------                               -----------         ------                     CBC Auto Differential[[id number]]        Abnormal            Final result                 Please view results for these tests on the individual orders.    Comprehensive Metabolic Panel [[id number]]  (Abnormal) Collected: 04/05/24 2029    Specimen: Blood Updated: 04/05/24 2129     Glucose 110 mg/dL      BUN 8 mg/dL      Creatinine 0.86 mg/dL      Sodium 131 mmol/L      Potassium 3.6 mmol/L      Comment: Specimen hemolyzed.  Result may be falsely elevated.        Chloride 94 mmol/L      CO2 25.0 mmol/L      Calcium 8.4 mg/dL      Total Protein 6.2 g/dL      Albumin 3.1 g/dL      ALT (SGPT)  10 U/L      Comment: Specimen hemolyzed.  Result may  be falsely elevated.        AST (SGOT) 31 U/L      Alkaline Phosphatase 82 U/L      Total Bilirubin 0.3 mg/dL      Globulin 3.1 gm/dL      Comment: Calculated Result        A/G Ratio 1.0 g/dL      BUN/Creatinine Ratio 9.3     Anion Gap 12.0 mmol/L      eGFR 82.4 mL/min/1.73     Narrative:      GFR Normal >60  Chronic Kidney Disease <60  Kidney Failure <15      Single High Sensitivity Troponin T [[id number]]  (Normal) Collected: 04/05/24 2029    Specimen: Blood Updated: 04/05/24 2103     HS Troponin T 10 ng/L     Narrative:      High Sensitive Troponin T Reference Range:  <14.0 ng/L- Negative Female for AMI  <22.0 ng/L- Negative Male for AMI  >=14 - Abnormal Female indicating possible myocardial injury.  >=22 - Abnormal Male indicating possible myocardial injury.   Clinicians would have to utilize clinical acumen, EKG, Troponin, and serial changes to determine if it is an Acute Myocardial Infarction or myocardial injury due to an underlying chronic condition.         Magnesium [[id number]]  (Normal) Collected: 04/05/24 2029    Specimen: Blood Updated: 04/05/24 2129     Magnesium 2.1 mg/dL     CBC Auto Differential [[id number]]  (Abnormal) Collected: 04/05/24 2029    Specimen: Blood Updated: 04/05/24 2041     WBC 11.67 10*3/mm3      RBC 3.83 10*6/mm3      Hemoglobin 9.7 g/dL      Hematocrit 31.9 %      MCV 83.3 fL      MCH 25.3 pg      MCHC 30.4 g/dL      RDW 15.2 %      RDW-SD 45.0 fl      MPV 9.0 fL      Platelets 601 10*3/mm3      Neutrophil % 77.6 %      Lymphocyte % 12.9 %      Monocyte % 7.2 %      Eosinophil % 1.4 %      Basophil % 0.4 %      Immature Grans % 0.5 %      Neutrophils, Absolute 9.05 10*3/mm3      Lymphocytes, Absolute 1.51 10*3/mm3      Monocytes, Absolute 0.84 10*3/mm3      Eosinophils, Absolute 0.16 10*3/mm3      Basophils, Absolute 0.05 10*3/mm3      Immature Grans, Absolute 0.06 10*3/mm3      nRBC 0.0 /100 WBC     Lactic Acid, Plasma  "[[id number]]  (Normal) Collected: 04/05/24 2029    Specimen: Blood Updated: 04/05/24 2101     Lactate 1.5 mmol/L      Comment: Falsely depressed results may occur on samples drawn from patients receiving N-Acetylcysteine (NAC) or Metamizole.       Procalcitonin [[id number]]  (Normal) Collected: 04/05/24 2029    Specimen: Blood Updated: 04/05/24 2111     Procalcitonin 0.11 ng/mL     Narrative:      As a Marker for Sepsis (Non-Neonates):    1. <0.5 ng/mL represents a low risk of severe sepsis and/or septic shock.  2. >2 ng/mL represents a high risk of severe sepsis and/or septic shock.    As a Marker for Lower Respiratory Tract Infections that require antibiotic therapy:    PCT on Admission    Antibiotic Therapy       6-12 Hrs later    >0.5                Strongly Recommended  >0.25 - <0.5        Recommended   0.1 - 0.25          Discouraged              Remeasure/reassess PCT  <0.1                Strongly Discouraged     Remeasure/reassess PCT    As 28 day mortality risk marker: \"Change in Procalcitonin Result\" (>80% or <=80%) if Day 0 (or Day 1) and Day 4 values are available. Refer to http://www.Beneqs-pct-calculator.com    Change in PCT <=80%  A decrease of PCT levels below or equal to 80% defines a positive change in PCT test result representing a higher risk for 28-day all-cause mortality of patients diagnosed with severe sepsis for septic shock.    Change in PCT >80%  A decrease of PCT levels of more than 80% defines a negative change in PCT result representing a lower risk for 28-day all-cause mortality of patients diagnosed with severe sepsis or septic shock.       COVID PRE-OP / PRE-PROCEDURE SCREENING ORDER (NO ISOLATION) - Swab, Nasopharynx [[id number]]  (Normal) Collected: 04/05/24 2151    Specimen: Swab from Nasopharynx Updated: 04/05/24 2221    Narrative:      The following orders were created for panel order COVID PRE-OP / PRE-PROCEDURE SCREENING ORDER (NO ISOLATION) - Swab, Nasopharynx.  Procedure        "                        Abnormality         Status                     ---------                               -----------         ------                     COVID-19 and FLU A/B PCR...[[id number]]  Normal              Final result                 Please view results for these tests on the individual orders.    COVID-19 and FLU A/B PCR, 1 HR TAT - Swab, Nasopharynx [[id number]]  (Normal) Collected: 04/05/24 2151    Specimen: Swab from Nasopharynx Updated: 04/05/24 2221     COVID19 Not Detected     Influenza A PCR Not Detected     Influenza B PCR Not Detected    Narrative:      Fact sheet for providers: https://www.fda.gov/media/666614/download    Fact sheet for patients: https://www.fda.gov/media/483221/download    Test performed by PCR.             CT Angiogram Chest Pulmonary Embolism    Result Date: 4/5/2024  CT ANGIOGRAM CHEST PULMONARY EMBOLISM Date of Exam: 4/5/2024 9:13 PM EDT Indication: s/p bilateral mastectomy. Comparison: None available. Technique: Axial CT images were obtained of the chest after the uneventful intravenous administration of 80 mL Isovue 370 utilizing pulmonary embolism protocol.  Reconstructed coronal and sagittal images were also obtained. Automated exposure control and  iterative construction methods were used. Findings: Contrast opacification of pulmonary arteries is slightly suboptimal, measured as average 195 Hounsfield units in both right and left pulmonary arteries. Technologist indicates difficult forearm IV injected at maximal tolerated right, best images possible. No filling defects are seen to suggest pulmonary embolic disease, and contrast is sufficient to exclude all but extremely small peripheral emboli. None are suspected. Thoracic aorta is normal in caliber and appearance. No pericardial or pleural effusion or mediastinal adenopathy is seen.  There are bilateral soft tissue drains apparently from previous mastectomy, and mild subcutaneous fat stranding typical for postop  change. No hematoma or seroma is seen. Images of the lungs show no evidence of pneumonia, edema, or other clearly acute chest pathology. There is a very mild mosaic perfusion appearance of the lungs, which on CTA is typically associated with small airways disease. Included images of the upper abdomen show fatty liver change, previous gastric sleeve surgery, but no significant abnormalities of the included portions of the spleen, pancreatic tail, gallbladder, adrenal glands, or upper renal poles. Bony structures appear to be intact.     Impression: Impression: 1. Slightly suboptimal contrast opacification of pulmonary arteries, but considered sufficient to exclude all but very small peripheral emboli. No evidence of embolic disease is seen. 2. No evidence of pneumonia or other clearly acute chest pathology is seen. 3. Expected postoperative changes of recent bilateral mastectomy, and prior gastric sleeve surgery. Electronically Signed: Fidel Garay MD  4/5/2024 9:35 PM EDT  Workstation ID: [id number]    XR Chest 1 View    Result Date: 4/5/2024  XR CHEST 1 VW Date of Exam: 4/5/2024 8:07 PM EDT Indication: Weak/Dizzy/AMS triage protocol Comparison: None available. Findings: Thin curvilinear density superimposed over the left chest may represent a soft tissue drain from recent mastectomy. Heart, mediastinum and pulmonary vasculature appear within normal limits. Lungs appear grossly clear allowing for superficial soft tissues  overlying the lung bases, with soft tissue shadows extending off the chest on both the right and left. No pneumothorax or effusion is seen.     Impression: Impression: 1. Left chest soft tissue drain. 2. No clearly acute chest disease is identified. Electronically Signed: Fidel Garay MD  4/5/2024 8:30 PM EDT  Workstation ID: [id number]        Medical Decision Making  Patient Presentation 48-year-old female status post bilateral mastectomy, presented with weakness    DDX dehydration, UTI, pneumonia, acute  kidney insufficiency, electrolyte abnormality, anemia    Data Review/ Non ED Records /Analysis/Ordering unique tests  Review of previous  non ED visits, prior labs, prior imaging, available notes from prior evaluations or visits with specialists, medication list, allergies, past medical history, past surgical history        Independent Review Studies  I Personally reviewed all laboratory studies performed in the emergency department     Intervention/Re-evaluation intervention included oral antibiotics, IV fluids on reevaluation patient felt improved    Independent Clinician no consultation    Risk Stratification tools/clinical decision rules patient presented after bilateral mastectomy, with weakness, I considered infection such as urinary tract infection, pneumonia, also considered dehydration, as well as flu, COVID or possibly a pulmonary emboli after surgery, CT scan was performed as well as labs, that were unremarkable with exception of urinary tract infection which could explain the patient's weakness, she was given oral antibiotics, and discharged home with a prescription, given signs and symptoms to look for to return.    Shared Decision Making discussed this plan of care with patient and family and they are agreeable    Disposition patient stable for discharge    Problems Addressed:  Acute cystitis without hematuria: complicated acute illness or injury    Amount and/or Complexity of Data Reviewed  Labs: ordered. Decision-making details documented in ED Course.  Radiology: ordered.  ECG/medicine tests: ordered.    Risk  Prescription drug management.          Final diagnoses:   Acute cystitis without hematuria           Disposition discharged home       Kyree Lawson Jr., PA-C  04/05/24 [id number]       Kyree Lawson Jr., PA-C  04/05/24 [id number]

## 2024-04-07 LAB
QT INTERVAL: 340 MS
QTC INTERVAL: 464 MS

## 2024-04-08 RX ORDER — LIDOCAINE AND PRILOCAINE 25; 25 MG/G; MG/G
1 CREAM TOPICAL AS NEEDED
Qty: 30 G | Refills: 3 | Status: SHIPPED | OUTPATIENT
Start: 2024-04-08

## 2024-04-09 ENCOUNTER — TELEPHONE (OUTPATIENT)
Dept: GENETICS | Facility: HOSPITAL | Age: 49
End: 2024-04-09
Payer: COMMERCIAL

## 2024-04-09 ENCOUNTER — OFFICE VISIT (OUTPATIENT)
Dept: ONCOLOGY | Facility: CLINIC | Age: 49
End: 2024-04-09
Payer: COMMERCIAL

## 2024-04-09 VITALS
OXYGEN SATURATION: 97 % | TEMPERATURE: 97.8 F | HEIGHT: 64 IN | WEIGHT: 202 LBS | BODY MASS INDEX: 34.49 KG/M2 | HEART RATE: 95 BPM | RESPIRATION RATE: 18 BRPM

## 2024-04-09 DIAGNOSIS — Z17.1 MALIGNANT NEOPLASM OF OVERLAPPING SITES OF RIGHT BREAST IN FEMALE, ESTROGEN RECEPTOR NEGATIVE: ICD-10-CM

## 2024-04-09 DIAGNOSIS — C50.811 MALIGNANT NEOPLASM OF OVERLAPPING SITES OF RIGHT BREAST IN FEMALE, ESTROGEN RECEPTOR NEGATIVE: ICD-10-CM

## 2024-04-09 DIAGNOSIS — C50.812 MALIGNANT NEOPLASM OF OVERLAPPING SITES OF BOTH BREASTS IN FEMALE, ESTROGEN RECEPTOR POSITIVE: Primary | ICD-10-CM

## 2024-04-09 DIAGNOSIS — Z17.0 MALIGNANT NEOPLASM OF OVERLAPPING SITES OF BOTH BREASTS IN FEMALE, ESTROGEN RECEPTOR POSITIVE: ICD-10-CM

## 2024-04-09 DIAGNOSIS — C50.811 MALIGNANT NEOPLASM OF OVERLAPPING SITES OF BOTH BREASTS IN FEMALE, ESTROGEN RECEPTOR POSITIVE: ICD-10-CM

## 2024-04-09 DIAGNOSIS — Z17.0 MALIGNANT NEOPLASM OF OVERLAPPING SITES OF BOTH BREASTS IN FEMALE, ESTROGEN RECEPTOR POSITIVE: Primary | ICD-10-CM

## 2024-04-09 DIAGNOSIS — C50.811 MALIGNANT NEOPLASM OF OVERLAPPING SITES OF BOTH BREASTS IN FEMALE, ESTROGEN RECEPTOR POSITIVE: Primary | ICD-10-CM

## 2024-04-09 DIAGNOSIS — C50.812 MALIGNANT NEOPLASM OF OVERLAPPING SITES OF BOTH BREASTS IN FEMALE, ESTROGEN RECEPTOR POSITIVE: ICD-10-CM

## 2024-04-09 PROCEDURE — 99215 OFFICE O/P EST HI 40 MIN: CPT | Performed by: NURSE PRACTITIONER

## 2024-04-09 RX ORDER — CLONIDINE HYDROCHLORIDE 0.1 MG/1
0.1 TABLET ORAL 2 TIMES DAILY
Qty: 60 TABLET | Refills: 0 | Status: SHIPPED | OUTPATIENT
Start: 2024-04-09

## 2024-04-09 RX ORDER — ONDANSETRON HYDROCHLORIDE 8 MG/1
8 TABLET, FILM COATED ORAL 3 TIMES DAILY PRN
Qty: 30 TABLET | Refills: 5 | Status: SHIPPED | OUTPATIENT
Start: 2024-04-09 | End: 2024-04-09 | Stop reason: SDUPTHER

## 2024-04-09 RX ORDER — DEXAMETHASONE 4 MG/1
TABLET ORAL
Qty: 12 TABLET | Refills: 3 | Status: SHIPPED | OUTPATIENT
Start: 2024-04-09 | End: 2024-04-09 | Stop reason: SDUPTHER

## 2024-04-09 RX ORDER — DEXAMETHASONE 4 MG/1
8 TABLET ORAL 2 TIMES DAILY
Qty: 12 TABLET | Refills: 3 | Status: SHIPPED | OUTPATIENT
Start: 2024-04-09

## 2024-04-09 RX ORDER — ONDANSETRON HYDROCHLORIDE 8 MG/1
8 TABLET, FILM COATED ORAL 3 TIMES DAILY PRN
Qty: 30 TABLET | Refills: 5 | Status: SHIPPED | OUTPATIENT
Start: 2024-04-09

## 2024-04-09 NOTE — PROGRESS NOTES
CHEMOTHERAPY PREPARATION    Ashlee Marte  [id number]  1975    Subjective   Chief Complaint: Treatment Preparation and Needs Assessment    History of present illness:  Ashlee Marte is a 48 y.o. year old female who is here today for chemotherapy preparation and needs assessment. The patient has been diagnosed with bilateral breast cancer as outlined below in the oncology history and is scheduled to begin treatment with Taxotere and Cytoxan along with Neulasta support.     Oncology History:    Oncology/Hematology History Overview Note   1.  Bilateral breast cancer:  Right side invasive ductal clinical T1c N0, 1.4 centimeter, ER positive WA positive HER2 negative, grade 2.  Pathologic 1.1 cm T1c N0 on mastectomy  Left side invasive ductal clinical T1b N0, 8 MM, ER negative, WA weakly positive, HER2/nabila negative, essentially triple negative grade 3.  Pathologic T1 a N0 with no residual tumor on mastectomy  Initial bilateral mastectomy recommended.    2.  COPD    Oncology history timeline:  -1/11/2024 bilateral screening [facility] read [facility] imaging showed asymmetry right breast additional views needed with mass in left breast requiring additional imaging  -2/1/2024 bilateral diagnostic mammogram [facility] showed persistent focal asymmetry right 12:00 7 cm from the nipple 7 mm hypoechoic mass indistinct with irregular margins ultrasound-guided biopsy recommended.  Left breast showed 8 mm 3:00 oval mass 10 cm from the nipple that on the ultrasound was 6 x 5 x 5 mm.  Incidental 4 mm 2:00 hypoechoic mass with internal vascularity 5 cm from the nipple for which ultrasound-guided biopsy recommended  -2/5/2024 right ultrasound-guided core biopsy 7 mm mass with HydroMARK T4 shaped tissue marker placed at the biopsy site.  Left ultrasound breast core biopsy 3:00 6 mm mass and 2:00 4 mm mass with HydroMARK T3 and T4 respectively.  Right  breast 12:00 lesion grade 2 invasive ductal Jimenez-Cui 6 out of 9, 4 mm.  % 3+, AL 50% 3+.  Also intermediate grade ductal carcinoma in situ  Left breast 3:00 invasive ductal carcinoma grade 3 Bloom-Cui 9 out of 9, 5-6 mm, ER 0%, AL 25% 1+, HER2/nabila negative 1+  Left breast 2:00 core biopsy fibroadenoma  -2/12/2024 office note Dr. Gregorio Ashton indicates patient with newly diagnosed bilateral breast cancer.  Screening mammogram showed 2 abnormalities in the left breast and 1 in the right.    No personal or family history of breast cancer.  Has hot flashes but not taking estrogen supplements.  -2/13/2024 cervical spine x-ray negative    -2/15/2024 [facility] medical oncology follow-up: Patient has bilateral breast cancers as outlined on routine screening mammogram without any other symptoms.  After her diagnosis, she has had muscle tension aches in the left shoulder which she has a tens unit and recent injection of steroids by her primary care.  This is distinctly in the muscle she says and not in her bones.2/13/2024 cervical spine x-ray has C7 obscured by overlapping bone and soft tissues but otherwise unremarkable. Recent CBC and CMP had normal bone enzymes and was otherwise unremarkable.  Hence I would not make much out of the neck pain in the way of concern for metastatic disease unless this worsens.   She is under a lot of psychological strain from this diagnosis and is quite anxious and I will refer her to Ashlee Nayak are oncology psychiatrist and we will get our breast cancer navigator working with her.  The 7 mm right breast tumor is grade 2  % 3+, AL 50% 3+, HER2/nabila 0+.  The left breast is clinically 6 mm grade 3 invasive ductal ER negative AL weakly positive HER2/nabila 1 negative essentially triple negative.  We will get MRI of her breasts and if the lesion in the left breast turns out to be larger than 1 cm mammographically or is node positive then we would give neoadjuvant therapy.  If  not, both for the 7 mm right breast and the 8 mm left breast tumor I would consider primary resection.  She has just started a job with [employer] in [place] and her insurance changes to [employer] in 2 weeks and she wants her surgery done in the [facility] facility for insurance reasons.  I will discuss this with Dr. Ashton (he was scrubbed when I called today) whose skills I recommended to her but we will make appropriate referrals per her request.  With her triple negativity she will also get genetic counseling.  Absent any other somatic complaints and assuming her neck continues to improve I would do no additional staging imaging in the absence of such symptoms.  We will also present her at our multidisciplinary tumor board once the MRI is completed for final decision making.She is committed to bilateral mammograms regardless of the results of the MRI or genetic testing.  Genetic testing is done on all triple negatives.She does have a history of gastric sleeve but still needs to lose weight.  She also carries a diagnosis of COPD and I am not sure who has managed that and Dr. QUAN may need her cleared by primary care/pulmonary before surgery but I will leave it to him.  She is not oxygen requiring.    -2/21/2024 bilateral breast MRI:  Right breast 12:00, 8 cm from nipple, 1.4 cm enhancing mass consistent with biopsy and no additional right breast lesions.  Left breast 2:00 5 cm from nipple is 8 mm masslike enhancement with central signal void artifact corresponding to biopsy site.  No additional suspicious sites in the left breast.  No internal mammary nor axillary salazar involvement on either side    -3/1/2024 [facility] medical oncology follow-up: We reviewed her MRI at multidisciplinary tumor board.  For the right breast cancer she would have surgery primarily followed by adjuvant hormone blockade but would not do Oncotype as, for the left breast for which we plan surgery upfront for the 8 mm size of tumor triple  negative she will need chemo regardless.  If the left breast lesion ends up less than 1 cm node-negative then we would give her Taxotere Cytoxan x 4.    If the left breast lesion is over a centimeter but less than 2 cm, then I would add Adriamycin.  If the left breast lesion is over 2 cm pathologically or node positive, then I would add carboplatin and Keytruda.  For the right breast cancer I would give her at least 5 and, if she is tolerating, 10 years of hormone blockade.  She had gone 1 year without menses but just started spotting.  I contacted Dr. Ramirez who will see her in [facility] office today for pelvic exam and to get hormone levels and to get ultrasound scheduled.  She has COPD without pulmonary function since prior to her surgery for bariatric surgery in 2019.  She smoked until November.  She is not requiring oxygen and uses her inhalers efficiently without major symptoms.  I spoke with Dr. QUAN and he is comfortable with proceeding without pulmonary function tests and he will get preoperative ABG.  I will see her back in about a month which will give her time to hopefully have healed from her surgery and we can then make plans for adjuvant therapy based upon the above algorithm.  She will need a port but Dr. QUAN does not want to place this during the time of surgery but he will make arrangements to do that postoperatively.    -3/21/2024 evacuation left mastectomy hematomaWith bilateral skin sparing mastectomies and bilateral sentinel node injection and biopsies.  Left breast mastectomy pathology shows no residual carcinoma and 1 benign sentinel node.  Pathologic T1 a N0 MO weakly positive essentially triple negative  Right breast mastectomy pathology shows 1.1 cm grade 3 invasive ductal carcinoma, 2 benign sentinel nodes pathologic T1c N0 ER/MO positive HER2/nabila negative    -4/2/2024 [facility] medical oncology follow-up: I reviewed the above bilateral mastectomy pathology reports with her.  Wounds are  "healing well though still with significant healing yet to do and still has drains in place and is due to see Dr. QUAN tomorrow.  Her left breast had no residual tumor and no lymph node involvement for a pathological T1a N0 weakly NY positive essentially triple negative breast cancer for which I have placed orders for Taxotere Cytoxan x 4.  For her right breast mastectomy which is healing well, she had a 1.1 cm grade 3 invasive ductal carcinoma with 2 benign sentinel nodes pathological T1c N0 ER and NY positive HER2/nabila negative for which I will treat her with Arimidex x 10 years following her chemotherapy.  We will get her to Dr. QUAN for port placement and she will have chemo preparation visit in our [place] office and we will start treatment in 2 weeks assuming Dr. QUAN has cleared her surgically.  No need for radiation given bilateral mastectomies.  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     Malignant neoplasm of overlapping sites of both breasts in female, estrogen receptor positive   4/17/2024 -  Chemotherapy    OP BREAST TC DOCEtaxel / Cyclophosphamide     Malignant neoplasm of overlapping sites of right female breast   3/21/2024 Initial Diagnosis    Malignant neoplasm of overlapping sites of right female breast     4/17/2024 -  Chemotherapy    OP BREAST TC DOCEtaxel / Cyclophosphamide         The current medication list and allergy list were reviewed and reconciled.     Past Medical History, Past Surgical History, Social History, Family History have been reviewed and are without significant changes except as mentioned.    Review of Systems   Constitutional:  Positive for fatigue.   Gastrointestinal:  Positive for nausea.   Genitourinary:         Completing treatment for UTI currently     Objective   Physical Exam  Vital Signs: Pulse 95   Temp 97.8 °F (36.6 °C)   Resp 18   Ht 161.3 cm (63.5\")   Wt 91.6 kg (202 lb)   SpO2 97%   BMI 35.22 kg/m²   Vitals:    " 04/09/24 1354   PainSc: 0-No pain           General Appearance:  alert, cooperative, no apparent distress and appears stated age   Neurologic/Psych: A&O x 3, gait steady, appropriate affect   HEENT:  Normocephalic, without obvious abnormality, mucous membranes moist   Lungs:   Respirations regular, even, and unlabored bilaterally       Extremities: Normal, atraumatic; no clubbing, cyanosis, or edema    Skin: No rashes, lesions, or abnormal coloration noted     ECOG Performance Status: 1 - Symptomatic but completely ambulatory            NEEDS ASSESSMENTS    Genetics  The patient's new diagnosis and family history have been reviewed for genetic counseling needs. A genetic referral is recommended.       Psychosocial  The patient has completed a PHQ-9 Depression Screening and the Distress Thermometer (DT) today.   PHQ-9 Total Score:  . PHQ-9 results show 1-4 (Minimal Depression). The patient scored their distress today as 3 on a scale of 0-10 with 0 being no distress and 10 being extreme distress.   Problems marked by the patient as being an issue for them within the last week include physical problems.   Results were reviewed along with psychosocial resources offered by our cancer center. Our oncology social worker will be flagged for a DT score of 4 or above, and a same day call will be made for a score of 9 or 10. A mental health referral is recommended at this time. The patient is accepting of a referral to SHAVON Valentino.  Referral has previously been made to Ashlee Nayak.  Copies of patient's questionnaires will be scanned into EMR for details and further reference.    Barriers to care  A barriers form was also completed by the patient today. We discussed services offered by our facility to help her have adequate access to care. The patient was given the name and card for our Oncology Social Worker. Based upon barriers assessment today, the patient will not require a follow-up call from the  to  "further discuss needs.   A copy of the barriers form will also be scanned into EMR for details and further reference.     VAD Assessment  The patient and I discussed planned intervenous chemotherapy as well as other IV treatments that are often needed throughout the course of treatment. These may include, but are not limited to blood transfusions, antibiotics, and IV hydration. The vasculature does not appear to be adequate for multiple peripheral IVs throughout their treatment course. Discussed risks and benefits of VADs. The patient would like to pursue Port-A-Cath insertion prior to initiation of treatment.  We are waiting for an appointment with Dr. QUAN for port placement.    Advance Care Planning   ACP discussion was held with the patient during this visit. Patient does not have an advance directive, information provided.  The patient and I discussed advanced care planning, \"Conversations that Matter\".   This service was offered, free of charge, for development of advance directives with a certified ACP facilitator.  The patient does not have an up-to-date advanced directive. This document is not on file with our office. The patient is not interested in an appointment with one of our facilitators to create or update their advanced directives.         Palliative Care  The patient and I discussed palliative care services. Palliative care is not the same as Hospice care. This is specialized medical care for people living with serious illness with the goal of improving quality of life for the patient and their family. [facility] offers our patients outpatient palliative care early along with their treatment to assist in coordination of care, symptom management, pain management, and medical decision making.  Oncology criteria for palliative care referral is not met at this time. The patient is not interested in a palliative care consultation.     Additional Referral needs  none      CHEMOTHERAPY EDUCATION    Booklets " Given: Chemotherapy and You [x]  Eating Hints [x]    Sexuality/Fertility Books []      Chemotherapy/Biotherapy Education Sheets: (list all that apply)  nausea management, acid reflux management, diarrhea management, Cancer resourse contacts information, and skin and mouth care                                                                                                                                                                 Chemotherapy Regimen:   Treatment Plans       Name Type Plan Dates Plan Provider         Active    OP BREAST TC DOCEtaxel / Cyclophosphamide ONCOLOGY TREATMENT  4/9/2024 - Present Corky Youngblood MD                        Chemotherapy education comprehension reviewed. Questions answered and additional information discussed on topics including:  Anemia, Thrombocytopenia, Neutropenia, Nutrition and appetite changes, Constipation, Diarrhea, Nausea & vomiting, Mouth sores, Alopecia, Nervous system changes, Pain, Skin & nail changes, and Home care        Assessment and Plan:    Diagnoses and all orders for this visit:    1. Malignant neoplasm of overlapping sites of both breasts in female, estrogen receptor positive (Primary)  -     dexAMETHasone (DECADRON) 4 MG tablet; Take 2 tablets oral twice a day for 3 consecutive days beginning the day before chemotherapy and continue for 6 doses.  Dispense: 12 tablet; Refill: 3  -     ondansetron (ZOFRAN) 8 MG tablet; Take 1 tablet by mouth 3 (Three) Times a Day As Needed for Nausea or Vomiting.  Dispense: 30 tablet; Refill: 5    2. Malignant neoplasm of overlapping sites of right breast in female, estrogen receptor negative  -     dexAMETHasone (DECADRON) 4 MG tablet; Take 2 tablets oral twice a day for 3 consecutive days beginning the day before chemotherapy and continue for 6 doses.  Dispense: 12 tablet; Refill: 3  -     ondansetron (ZOFRAN) 8 MG tablet; Take 1 tablet by mouth 3 (Three) Times a Day As Needed for Nausea or Vomiting.  Dispense: 30  tablet; Refill: 5          Ashlee reports that she just had her drains pulled yesterday at Dr. QUAN's office, she states 1 had fallen out and the other was pulled in the office.  She has some minimal discomfort in her abdomen.  She is nauseated today and actually had emesis during our visit.  She was seen in the ER Friday with fatigue and feeling poorly, she was diagnosed with UTI and is completing a course of antibiotics.  We discussed that she could have some nausea related to her antibiotic, she will let me know if it does not improve but she felt better after she threw up.  She is still waiting on port placement, she is going to reach out to Dr. QUAN's office on Thursday to see when that is scheduled.  We will likely have to pressure treatment out another week.    The patient and I have reviewed their new cancer diagnosis and scheduled treatment plan. Needs assessment was completed including genetics, psychosocial needs, barriers to care, VAD evaluation, advanced care planning, and palliative care services. Referrals have been ordered as appropriate based upon our evaluation and patient desires.     Chemotherapy teaching was also completed today as documented above. Adequate time was given to answer all questions to her satisfaction. Patient and family are aware of their care team members and contact information if they have questions or problems throughout the treatment course. Needs assessments and education has been completed. The patient is adequately prepared to begin treatment as scheduled.     I spent 45 minutes caring for Ashlee on this date of service. This time includes time spent by me in the following activities: preparing for the visit, reviewing tests, obtaining and/or reviewing a separately obtained history, performing a medically appropriate examination and/or evaluation, counseling and educating the patient/family/caregiver, ordering medications, tests, or procedures, documenting information in the  medical record, and care coordination.     Electronically signed by SHAVON Castle on 04/09/24 at 15:44 EDT.

## 2024-04-10 ENCOUNTER — PATIENT OUTREACH (OUTPATIENT)
Dept: CASE MANAGEMENT | Facility: OTHER | Age: 49
End: 2024-04-10
Payer: COMMERCIAL

## 2024-04-10 NOTE — OUTREACH NOTE
AMBULATORY CASE MANAGEMENT NOTE    Name and Relationship of Patient/Support Person: Ashlee Marte - Self  Self    Patient Outreach    Port placement planned for 4/18/2024, waiting for treatment appointment confirmation.  Education provided on symptom management, has ondansetron 8 mg on hand as well as dexamethasone, voices understanding of administration.  Patient has purchased several head wraps in anticipation of alopecia. Education provided on importance of maintaining adequate nutrition and hydration. Patient denies questions or concerns at this time.    Education Documentation  Fluid/Food Intake, taught by Roslyn Singh, RN at 4/10/2024  3:03 PM.  Learner: Patient  Readiness: Acceptance  Method: Explanation  Response: Verbalizes Understanding    Symptom Management, taught by Roslyn Singh, NIKITA at 4/10/2024  2:58 PM.  Learner: Patient  Readiness: Acceptance  Method: Explanation  Response: Verbalizes Understanding          Roslyn CRAWFORD  Ambulatory Case Management    4/10/2024, 15:03 EDT

## 2024-04-10 NOTE — PROGRESS NOTES
Her port will be placed on 4/18/2024 therefore we will have to move her treatment start date out 1 week.

## 2024-04-11 ENCOUNTER — TELEPHONE (OUTPATIENT)
Dept: GENETICS | Facility: HOSPITAL | Age: 49
End: 2024-04-11
Payer: COMMERCIAL

## 2024-04-12 ENCOUNTER — TELEPHONE (OUTPATIENT)
Dept: GENETICS | Facility: HOSPITAL | Age: 49
End: 2024-04-12
Payer: COMMERCIAL

## 2024-04-12 ENCOUNTER — DOCUMENTATION (OUTPATIENT)
Dept: GENETICS | Facility: HOSPITAL | Age: 49
End: 2024-04-12
Payer: COMMERCIAL

## 2024-04-12 NOTE — PROGRESS NOTES
Ashlee Marte is a 48 y.o. female who was referred for genetic counseling due to a personal history of breast cancer. Genetic counseling was performed via telephone. Ms. Marte confirmed her full name, date of birth, and that she was physically located in the [facility] at the time of the appointment. Ms. Marte was recently diagnosed with a bilateral breast cancer. Her right breast cancer was an invasive ductal which was ER positive, CT positive, and HER2 negative. Her left breast cancer was an invasive ductal which was essentially triple negative. She underwent bilateral mastectomy on 3/21 and reports her current treatment plan is 4 rounds of chemotherapy. Ms. Marte retains her uterus and ovaries. She has not yet started colonoscopies. Ms. Marte was interested in discussing her risk for a hereditary cancer syndrome, and decided to pursue genetic testing. The Common Hereditary Cancers panel was ordered through [employer] which analyzes 48 genes associated with an increased cancer risk. Genetic testing was negative for pathogenic mutations in BRCA1/2 and 46 additional genes on the Common Hereditary Cancers panel.  These normal results were discussed with Ms. Marte by telephone on 4/12/2024.     PERTINENT FAMILY HISTORY:  Mother:                                    Lung cancer, 66  Mat. Half-Aunt 1:                     Ovarian cancer, 60s  Mat. Half-Aunt 2:                     Skin cancer  Pat. Aunt 1:                             Breast cancer, 50s                                                  Possible uterine cancer, 30s  Pat. Uncle 1:                           Esophageal cancer  Pat. Uncle 2:                           Lung cancer, 60s                                                  Kidney cancer, 60s  Pat. Uncle 3:                           Unknown, possible colon cancer  Pat. Uncle 4:                           Pancreatic cancer  Pat. Half-Aunt:                        Breast cancer  Pat. Grandmother:                   Breast cancer, 50s     Medical records regarding the diagnoses in the family were not available for review.      RISK ASSESSMENT:  Ms. Marte's personal history of breast cancer led to concern for a hereditary cancer syndrome. she clearly meets NCCN criteria for BRCA1/2 testing based on her personal history of breast cancer. We discussed multigene panel testing that would evaluate multiple genes simultaneously associated with hereditary cancer risk. This risk assessment is based on the family history information provided at the time of the appointment and could change in the future should new information be obtained.     GENETIC COUNSELING (30 minutes) We reviewed the family history information in detail.  Cases of cancer follow three general patterns: sporadic, familial, and hereditary.  While most cancer is sporadic, some cases appear to occur in family clusters.  These cases are said to be familial and account for 10-20% of cancer cases.  Familial cases may be due to a combination of shared genes and environmental factors among family members.  In even fewer families, the cancer is said to be inherited, and the genes responsible for the cancer are known.       Family histories typical of hereditary cancer syndromes usually include multiple first- and second-degree relatives diagnosed with cancer types that define a syndrome.  These cases tend to be diagnosed at younger-than-expected ages and can be bilateral or multifocal.  The cancer in these families follows an autosomal dominant inheritance pattern, which indicates the likely presence of a mutation in a cancer susceptibility gene.  Children and siblings of an individual believed to carry this mutation have a 50% chance of inheriting that mutation, thereby inheriting the increased risk to develop cancer.  These mutations can be passed down from the maternal or the paternal lineage.     Hereditary breast cancer accounts for 5-10% of all cases of  breast cancer.  A significant proportion of hereditary breast and ovarian cancer can be attributed to mutations in the BRCA1 and BRCA2 genes.  Mutations in these genes confer an increased risk for breast cancer, ovarian cancer, male breast cancer, prostate cancer, and pancreatic cancer. Women with a BRCA1 or BRCA2 mutation who have already been diagnosed with breast cancer have a 20-40% risk of a contralateral breast cancer. Women with a BRCA1 or BRCA2 mutation have up to a 60% risk of ovarian cancer.  BRCA1 has been more significantly associated with triple negative breast cancers than other genes. There are other genes considered to confer a high risk for breast cancer.     There are other genes that are known to be associated with an increased risk for cancer.  Some of these genes have well defined risks and established management guidelines.  Other genes that can be tested for have been more recently described, and there may be less data regarding the risks and therefore may not have established management guidelines.  Based on Ms. Marte's desire to get as much information as possible regarding her personal risks and potential risks for her family, she opted to pursue testing through a panel that would evaluate multiple genes that have been associated with cancer risk.      GENETIC TESTING:  The risks, benefits and limitations of genetic testing and implications for clinical management following testing were reviewed.  DNA test results can influence decisions regarding screening, prevention and surgical management.  Genetic testing can have significant psychological implications for both individuals and families.  Also discussed was the possibility of employment and insurance discrimination based on genetic test results and the laws in place to prevent this (NASH).     We discussed panel testing that would evaluate 48 genes associated with increased cancer risk. The implications of a positive or negative test  result were discussed. We discussed the possibility that, in some cases, genetic test results may be informative or may be ambiguous due to the identification of a genetic variant. These variants may or may not be associated with an increased cancer risk.  With multigene panel testing, it is not uncommon for a variant of uncertain significance (VUS) to be identified.  If a VUS is identified, testing unaffected family members is typically not recommended and screening recommendations are made based on the family history.  The laboratories that perform genetic testing work to reclassify the VUS and send out an amended report if and when a VUS is reclassified.  The majority of variant findings are ultimately reclassified to a negative result.  Given her personal and family history, a negative test result would not eliminate all risk to her relatives.       TEST RESULTS:  Genetic testing was negative for known pathogenic mutations by sequencing, rearrangement testing, and RNA analysis for the 48 genes on the Common Hereditary Cancers panel (see attached results). This negative result greatly lowers the risk of a hereditary cancer syndrome for Ms. Marte. This assessment is based on the information provided at time of consultation.    CANCER SCREENING: Ms. Marte's surveillance and management will be determined by her oncology team. Despite the negative genetic test results, Ms. Marte's female relatives may have a somewhat increased lifetime risk for breast cancer based on family history. Female relatives could have a risk assessment performed using a family history-based model, such as the Tyrer-Cuzick model, to determine their individual risks.  Surveillance for individuals with a high lifetime risk of breast cancer (>20%, versus the average risk of 12%), based on NCCN guidelines, would consist of semi-annual clinical breast exams and monthly self-breast exams starting by age 18 and annual mammography starting 10  years younger than the earliest diagnosis in a close relative, or starting by age 40, whichever is earliest.  According to an American [employer] expert panel, annual breast MRI should be offered to women whose lifetime risk of breast cancer is 20-25 percent or more, also starting by age 40 or earlier if indicated by family history.      PLAN: Genetic counseling remains available to Ms. Marte and her family. She is welcome to contact us with any questions or concerns at [phone].     Norma Bowden MS, [facility] [place], [facility]  Licensed Certified Genetic Counselor      Cc: Corky Beck MD

## 2024-04-12 NOTE — TELEPHONE ENCOUNTER
At the request of the genetic counselor, I spoke with the patient and disclosed the results from recent genetic testing.

## 2024-04-17 ENCOUNTER — TRANSCRIBE ORDERS (OUTPATIENT)
Dept: GENERAL RADIOLOGY | Facility: HOSPITAL | Age: 49
End: 2024-04-17
Payer: COMMERCIAL

## 2024-04-17 DIAGNOSIS — C50.412 MALIGNANT NEOPLASM OF UPPER-OUTER QUADRANT OF LEFT FEMALE BREAST, UNSPECIFIED ESTROGEN RECEPTOR STATUS: Primary | ICD-10-CM

## 2024-04-18 ENCOUNTER — TELEPHONE (OUTPATIENT)
Dept: ONCOLOGY | Facility: CLINIC | Age: 49
End: 2024-04-18
Payer: COMMERCIAL

## 2024-04-18 ENCOUNTER — HOSPITAL ENCOUNTER (OUTPATIENT)
Dept: GENERAL RADIOLOGY | Facility: HOSPITAL | Age: 49
Discharge: HOME | End: 2024-04-18

## 2024-04-18 DIAGNOSIS — C50.412 MALIGNANT NEOPLASM OF UPPER-OUTER QUADRANT OF LEFT FEMALE BREAST, UNSPECIFIED ESTROGEN RECEPTOR STATUS: ICD-10-CM

## 2024-04-18 PROCEDURE — 71045 X-RAY EXAM CHEST 1 VIEW: CPT

## 2024-04-18 NOTE — TELEPHONE ENCOUNTER
Caller: Ashlee Marte    Relationship: Self    Best call back number: [phone]      What was the call regarding: PT CALLED TO SPEAK TO DR AVELINA CUELLO DOESN'T WANT PATIENT TO START CHEMO YET AND SHE WOULD NEED TO CANCEL UPCOMING APPOINTMENTS FOR TREATMENT

## 2024-04-19 NOTE — TELEPHONE ENCOUNTER
Called patient, she states that she is seeing Dr. QUAN on 4/24/24 to see if she is cleared surgically for chemo. She is agreeable to reschedule appts for next week to the following week.

## 2024-04-25 ENCOUNTER — TELEPHONE (OUTPATIENT)
Dept: ONCOLOGY | Facility: CLINIC | Age: 49
End: 2024-04-25
Payer: COMMERCIAL

## 2024-05-01 ENCOUNTER — INFUSION (OUTPATIENT)
Dept: ONCOLOGY | Facility: HOSPITAL | Age: 49
End: 2024-05-01
Payer: COMMERCIAL

## 2024-05-01 VITALS
SYSTOLIC BLOOD PRESSURE: 131 MMHG | BODY MASS INDEX: 34.94 KG/M2 | DIASTOLIC BLOOD PRESSURE: 76 MMHG | TEMPERATURE: 96.9 F | HEART RATE: 67 BPM | WEIGHT: 200.4 LBS | RESPIRATION RATE: 17 BRPM

## 2024-05-01 DIAGNOSIS — C50.812 MALIGNANT NEOPLASM OF OVERLAPPING SITES OF BOTH BREASTS IN FEMALE, ESTROGEN RECEPTOR POSITIVE: Primary | ICD-10-CM

## 2024-05-01 DIAGNOSIS — Z17.0 MALIGNANT NEOPLASM OF OVERLAPPING SITES OF BOTH BREASTS IN FEMALE, ESTROGEN RECEPTOR POSITIVE: Primary | ICD-10-CM

## 2024-05-01 DIAGNOSIS — C50.811 MALIGNANT NEOPLASM OF OVERLAPPING SITES OF RIGHT BREAST IN FEMALE, ESTROGEN RECEPTOR NEGATIVE: ICD-10-CM

## 2024-05-01 DIAGNOSIS — C50.811 MALIGNANT NEOPLASM OF OVERLAPPING SITES OF BOTH BREASTS IN FEMALE, ESTROGEN RECEPTOR POSITIVE: Primary | ICD-10-CM

## 2024-05-01 DIAGNOSIS — Z17.1 MALIGNANT NEOPLASM OF OVERLAPPING SITES OF RIGHT BREAST IN FEMALE, ESTROGEN RECEPTOR NEGATIVE: ICD-10-CM

## 2024-05-01 PROCEDURE — 36591 DRAW BLOOD OFF VENOUS DEVICE: CPT

## 2024-05-01 PROCEDURE — 25010000002 HEPARIN LOCK FLUSH PER 10 UNITS: Performed by: INTERNAL MEDICINE

## 2024-05-01 RX ORDER — SODIUM CHLORIDE 0.9 % (FLUSH) 0.9 %
20 SYRINGE (ML) INJECTION AS NEEDED
Status: CANCELLED | OUTPATIENT
Start: 2024-05-01

## 2024-05-01 RX ORDER — HEPARIN SODIUM (PORCINE) LOCK FLUSH IV SOLN 100 UNIT/ML 100 UNIT/ML
500 SOLUTION INTRAVENOUS AS NEEDED
Status: DISCONTINUED | OUTPATIENT
Start: 2024-05-01 | End: 2024-05-01 | Stop reason: HOSPADM

## 2024-05-01 RX ORDER — HEPARIN SODIUM (PORCINE) LOCK FLUSH IV SOLN 100 UNIT/ML 100 UNIT/ML
500 SOLUTION INTRAVENOUS AS NEEDED
Status: CANCELLED | OUTPATIENT
Start: 2024-05-01

## 2024-05-01 RX ADMIN — HEPARIN 500 UNITS: 100 SYRINGE at 13:35

## 2024-05-02 ENCOUNTER — OFFICE VISIT (OUTPATIENT)
Dept: ONCOLOGY | Facility: CLINIC | Age: 49
End: 2024-05-02
Payer: COMMERCIAL

## 2024-05-02 ENCOUNTER — INFUSION (OUTPATIENT)
Dept: ONCOLOGY | Facility: HOSPITAL | Age: 49
End: 2024-05-02
Payer: COMMERCIAL

## 2024-05-02 VITALS
TEMPERATURE: 98.3 F | HEART RATE: 113 BPM | RESPIRATION RATE: 18 BRPM | OXYGEN SATURATION: 98 % | DIASTOLIC BLOOD PRESSURE: 88 MMHG | BODY MASS INDEX: 34.15 KG/M2 | SYSTOLIC BLOOD PRESSURE: 123 MMHG | WEIGHT: 200 LBS | HEIGHT: 64 IN

## 2024-05-02 VITALS — SYSTOLIC BLOOD PRESSURE: 133 MMHG | HEART RATE: 85 BPM | DIASTOLIC BLOOD PRESSURE: 80 MMHG

## 2024-05-02 DIAGNOSIS — C50.812 MALIGNANT NEOPLASM OF OVERLAPPING SITES OF BOTH BREASTS IN FEMALE, ESTROGEN RECEPTOR POSITIVE: Primary | ICD-10-CM

## 2024-05-02 DIAGNOSIS — C50.811 MALIGNANT NEOPLASM OF OVERLAPPING SITES OF BOTH BREASTS IN FEMALE, ESTROGEN RECEPTOR POSITIVE: Primary | ICD-10-CM

## 2024-05-02 DIAGNOSIS — Z17.0 MALIGNANT NEOPLASM OF OVERLAPPING SITES OF BOTH BREASTS IN FEMALE, ESTROGEN RECEPTOR POSITIVE: Primary | ICD-10-CM

## 2024-05-02 DIAGNOSIS — Z17.1 MALIGNANT NEOPLASM OF OVERLAPPING SITES OF RIGHT BREAST IN FEMALE, ESTROGEN RECEPTOR NEGATIVE: ICD-10-CM

## 2024-05-02 DIAGNOSIS — C50.811 MALIGNANT NEOPLASM OF OVERLAPPING SITES OF RIGHT BREAST IN FEMALE, ESTROGEN RECEPTOR NEGATIVE: ICD-10-CM

## 2024-05-02 LAB
ALBUMIN SERPL-MCNC: 3.7 G/DL (ref 3.5–5.2)
ALBUMIN/GLOB SERPL: 1.7 G/DL
ALP SERPL-CCNC: 115 U/L (ref 39–117)
ALT SERPL-CCNC: 6 U/L (ref 1–33)
AST SERPL-CCNC: 9 U/L (ref 1–32)
BASOPHILS # BLD AUTO: 0.05 10*3/MM3 (ref 0–0.2)
BASOPHILS NFR BLD AUTO: 0.4 % (ref 0–1.5)
BILIRUB SERPL-MCNC: 0.3 MG/DL (ref 0–1.2)
BUN SERPL-MCNC: 14 MG/DL (ref 6–20)
BUN/CREAT SERPL: 17.3 (ref 7–25)
CALCIUM SERPL-MCNC: 9 MG/DL (ref 8.6–10.5)
CHLORIDE SERPL-SCNC: 104 MMOL/L (ref 98–107)
CO2 SERPL-SCNC: 25.9 MMOL/L (ref 22–29)
CREAT SERPL-MCNC: 0.81 MG/DL (ref 0.57–1)
EGFRCR SERPLBLD CKD-EPI 2021: 88.6 ML/MIN/1.73
EOSINOPHIL # BLD AUTO: 0.27 10*3/MM3 (ref 0–0.4)
EOSINOPHIL NFR BLD AUTO: 2.3 % (ref 0.3–6.2)
ERYTHROCYTE [DISTWIDTH] IN BLOOD BY AUTOMATED COUNT: 15.4 % (ref 12.3–15.4)
GLOBULIN SER CALC-MCNC: 2.2 GM/DL
GLUCOSE SERPL-MCNC: 92 MG/DL (ref 65–99)
HCT VFR BLD AUTO: 31.3 % (ref 34–46.6)
HGB BLD-MCNC: 9.4 G/DL (ref 12–15.9)
IMM GRANULOCYTES # BLD AUTO: 0.07 10*3/MM3 (ref 0–0.05)
IMM GRANULOCYTES NFR BLD AUTO: 0.6 % (ref 0–0.5)
LYMPHOCYTES # BLD AUTO: 0.7 10*3/MM3 (ref 0.7–3.1)
LYMPHOCYTES NFR BLD AUTO: 6 % (ref 19.6–45.3)
MCH RBC QN AUTO: 23.8 PG (ref 26.6–33)
MCHC RBC AUTO-ENTMCNC: 30 G/DL (ref 31.5–35.7)
MCV RBC AUTO: 79.2 FL (ref 79–97)
MONOCYTES # BLD AUTO: 0.2 10*3/MM3 (ref 0.1–0.9)
MONOCYTES NFR BLD AUTO: 1.7 % (ref 5–12)
NEUTROPHILS # BLD AUTO: 10.32 10*3/MM3 (ref 1.7–7)
NEUTROPHILS NFR BLD AUTO: 89 % (ref 42.7–76)
NRBC BLD AUTO-RTO: 0 /100 WBC (ref 0–0.2)
PLATELET # BLD AUTO: 541 10*3/MM3 (ref 140–450)
POTASSIUM SERPL-SCNC: 5.1 MMOL/L (ref 3.5–5.2)
PROT SERPL-MCNC: 5.9 G/DL (ref 6–8.5)
RBC # BLD AUTO: 3.95 10*6/MM3 (ref 3.77–5.28)
SODIUM SERPL-SCNC: 138 MMOL/L (ref 136–145)
WBC # BLD AUTO: 11.61 10*3/MM3 (ref 3.4–10.8)

## 2024-05-02 PROCEDURE — 25010000002 DOCETAXEL 20 MG/ML SOLUTION 8 ML VIAL: Performed by: INTERNAL MEDICINE

## 2024-05-02 PROCEDURE — 25010000002 PEGFILGRASTIM 6 MG/0.6ML PREFILLED SYRINGE KIT: Performed by: NURSE PRACTITIONER

## 2024-05-02 PROCEDURE — 25010000002 CYCLOPHOSPHAMIDE 1 GM/5ML SOLUTION 5 ML VIAL: Performed by: INTERNAL MEDICINE

## 2024-05-02 PROCEDURE — 25010000002 HEPARIN LOCK FLUSH PER 10 UNITS: Performed by: INTERNAL MEDICINE

## 2024-05-02 PROCEDURE — 96413 CHEMO IV INFUSION 1 HR: CPT

## 2024-05-02 PROCEDURE — 25010000002 HYDROCORTISONE SOD SUC (PF) 100 MG RECONSTITUTED SOLUTION: Performed by: INTERNAL MEDICINE

## 2024-05-02 PROCEDURE — 96415 CHEMO IV INFUSION ADDL HR: CPT

## 2024-05-02 PROCEDURE — 96375 TX/PRO/DX INJ NEW DRUG ADDON: CPT

## 2024-05-02 PROCEDURE — 96549 UNLISTED CHEMOTHERAPY PX: CPT

## 2024-05-02 PROCEDURE — 25810000003 SODIUM CHLORIDE 0.9 % SOLUTION 250 ML FLEX CONT: Performed by: INTERNAL MEDICINE

## 2024-05-02 PROCEDURE — 25010000002 DIPHENHYDRAMINE PER 50 MG: Performed by: INTERNAL MEDICINE

## 2024-05-02 PROCEDURE — 25810000003 SODIUM CHLORIDE 0.9 % SOLUTION: Performed by: INTERNAL MEDICINE

## 2024-05-02 PROCEDURE — 25010000002 PALONOSETRON PER 25 MCG: Performed by: INTERNAL MEDICINE

## 2024-05-02 PROCEDURE — 96377 APPLICATON ON-BODY INJECTOR: CPT

## 2024-05-02 RX ORDER — SODIUM CHLORIDE 9 MG/ML
20 INJECTION, SOLUTION INTRAVENOUS ONCE
Status: COMPLETED | OUTPATIENT
Start: 2024-05-02 | End: 2024-05-02

## 2024-05-02 RX ORDER — FAMOTIDINE 10 MG/ML
20 INJECTION, SOLUTION INTRAVENOUS AS NEEDED
Status: COMPLETED | OUTPATIENT
Start: 2024-05-02 | End: 2024-05-02

## 2024-05-02 RX ORDER — DIPHENHYDRAMINE HYDROCHLORIDE 50 MG/ML
50 INJECTION INTRAMUSCULAR; INTRAVENOUS AS NEEDED
Status: COMPLETED | OUTPATIENT
Start: 2024-05-02 | End: 2024-05-02

## 2024-05-02 RX ORDER — HEPARIN SODIUM (PORCINE) LOCK FLUSH IV SOLN 100 UNIT/ML 100 UNIT/ML
500 SOLUTION INTRAVENOUS AS NEEDED
Status: DISCONTINUED | OUTPATIENT
Start: 2024-05-02 | End: 2024-05-02 | Stop reason: HOSPADM

## 2024-05-02 RX ORDER — PALONOSETRON 0.05 MG/ML
0.25 INJECTION, SOLUTION INTRAVENOUS ONCE
Status: COMPLETED | OUTPATIENT
Start: 2024-05-02 | End: 2024-05-02

## 2024-05-02 RX ORDER — HEPARIN SODIUM (PORCINE) LOCK FLUSH IV SOLN 100 UNIT/ML 100 UNIT/ML
500 SOLUTION INTRAVENOUS AS NEEDED
OUTPATIENT
Start: 2024-05-02

## 2024-05-02 RX ORDER — SODIUM CHLORIDE 0.9 % (FLUSH) 0.9 %
20 SYRINGE (ML) INJECTION AS NEEDED
OUTPATIENT
Start: 2024-05-02

## 2024-05-02 RX ADMIN — SODIUM CHLORIDE 20 ML/HR: 9 INJECTION, SOLUTION INTRAVENOUS at 10:40

## 2024-05-02 RX ADMIN — FAMOTIDINE 20 MG: 10 INJECTION INTRAVENOUS at 11:21

## 2024-05-02 RX ADMIN — HYDROCORTISONE SODIUM SUCCINATE 100 MG: 100 INJECTION, POWDER, FOR SOLUTION INTRAMUSCULAR; INTRAVENOUS at 11:22

## 2024-05-02 RX ADMIN — CYCLOPHOSPHAMIDE 1200 MG: 1 INJECTION INTRAVENOUS at 13:16

## 2024-05-02 RX ADMIN — HEPARIN 500 UNITS: 100 SYRINGE at 14:15

## 2024-05-02 RX ADMIN — SODIUM CHLORIDE 150 MG: 9 INJECTION, SOLUTION INTRAVENOUS at 11:05

## 2024-05-02 RX ADMIN — DIPHENHYDRAMINE HYDROCHLORIDE 50 MG: 50 INJECTION, SOLUTION INTRAMUSCULAR; INTRAVENOUS at 11:20

## 2024-05-02 RX ADMIN — PEGFILGRASTIM 6 MG: KIT SUBCUTANEOUS at 14:10

## 2024-05-02 RX ADMIN — PALONOSETRON HYDROCHLORIDE 0.25 MG: 0.25 INJECTION INTRAVENOUS at 10:46

## 2024-05-02 NOTE — PROGRESS NOTES
CHIEF COMPLAINT: Anxious about starting treatment    Problem List:  Oncology/Hematology History Overview Note   1.  Bilateral breast cancer:  Right side invasive ductal clinical T1c N0, 1.4 centimeter, ER positive RI positive HER2 negative, grade 2.  Pathologic 1.1 cm T1c N0 on mastectomy  Left side invasive ductal clinical T1b N0, 8 MM, ER negative, RI weakly positive, HER2/nabila negative, essentially triple negative grade 3.  Pathologic T1 a N0 with no residual tumor on mastectomy  Initial bilateral mastectomy recommended.    2.  COPD    Oncology history timeline:  -1/11/2024 bilateral screening mammogram [facility] read [facility] imaging showed asymmetry right breast additional views needed with mass in left breast requiring additional imaging  -2/1/2024 bilateral diagnostic mammogram [facility] showed persistent focal asymmetry right 12:00 7 cm from the nipple 7 mm hypoechoic mass indistinct with irregular margins ultrasound-guided biopsy recommended.  Left breast showed 8 mm 3:00 oval mass 10 cm from the nipple that on the ultrasound was 6 x 5 x 5 mm.  Incidental 4 mm 2:00 hypoechoic mass with internal vascularity 5 cm from the nipple for which ultrasound-guided biopsy recommended  -2/5/2024 right ultrasound-guided core biopsy 7 mm mass with HydroMARK T4 shaped tissue marker placed at the biopsy site.  Left ultrasound breast core biopsy 3:00 6 mm mass and 2:00 4 mm mass with HydroMARK T3 and T4 respectively.  Right breast 12:00 lesion grade 2 invasive ductal Jimenez-Cui 6 out of 9, 4 mm.  % 3+, RI 50% 3+.  Also intermediate grade ductal carcinoma in situ  Left breast 3:00 invasive ductal carcinoma grade 3 Bloom-Cui 9 out of 9, 5-6 mm, ER 0%, RI 25% 1+, HER2/nabila negative 1+  Left breast 2:00 core biopsy fibroadenoma  -2/12/2024 office note Dr. Gregorio Ashton indicates patient with newly diagnosed bilateral breast cancer.  Screening  mammogram showed 2 abnormalities in the left breast and 1 in the right.    No personal or family history of breast cancer.  Has hot flashes but not taking estrogen supplements.  -2/13/2024 cervical spine x-ray negative    -2/15/2024 [facility] medical oncology follow-up: Patient has bilateral breast cancers as outlined on routine screening mammogram without any other symptoms.  After her diagnosis, she has had muscle tension aches in the left shoulder which she has a tens unit and recent injection of steroids by her primary care.  This is distinctly in the muscle she says and not in her bones.2/13/2024 cervical spine x-ray has C7 obscured by overlapping bone and soft tissues but otherwise unremarkable. Recent CBC and CMP had normal bone enzymes and was otherwise unremarkable.  Hence I would not make much out of the neck pain in the way of concern for metastatic disease unless this worsens.   She is under a lot of psychological strain from this diagnosis and is quite anxious and I will refer her to Ashlee Nayak are oncology psychiatrist and we will get our breast cancer navigator working with her.  The 7 mm right breast tumor is grade 2  % 3+, UT 50% 3+, HER2/nabila 0+.  The left breast is clinically 6 mm grade 3 invasive ductal ER negative UT weakly positive HER2/nabila 1 negative essentially triple negative.  We will get MRI of her breasts and if the lesion in the left breast turns out to be larger than 1 cm mammographically or is node positive then we would give neoadjuvant therapy.  If not, both for the 7 mm right breast and the 8 mm left breast tumor I would consider primary resection.  She has just started a job with [employer] in [place] and her insurance changes to [facility] in 2 weeks and she wants her surgery done in the [facility] facility for insurance reasons.  I will discuss this with Dr. Ashton (he was scrubbed when I called today) whose skills I recommended to her but we will make appropriate referrals per  her request.  With her triple negativity she will also get genetic counseling.  Absent any other somatic complaints and assuming her neck continues to improve I would do no additional staging imaging in the absence of such symptoms.  We will also present her at our multidisciplinary tumor board once the MRI is completed for final decision making.She is committed to bilateral mammograms regardless of the results of the MRI or genetic testing.  Genetic testing is done on all triple negatives.She does have a history of gastric sleeve but still needs to lose weight.  She also carries a diagnosis of COPD and I am not sure who has managed that and Dr. QUAN may need her cleared by primary care/pulmonary before surgery but I will leave it to him.  She is not oxygen requiring.    -2/21/2024 bilateral breast MRI:  Right breast 12:00, 8 cm from nipple, 1.4 cm enhancing mass consistent with biopsy and no additional right breast lesions.  Left breast 2:00 5 cm from nipple is 8 mm masslike enhancement with central signal void artifact corresponding to biopsy site.  No additional suspicious sites in the left breast.  No internal mammary nor axillary salazar involvement on either side    -3/1/2024 [facility] medical oncology follow-up: We reviewed her MRI at multidisciplinary tumor board.  For the right breast cancer she would have surgery primarily followed by adjuvant hormone blockade but would not do Oncotype as, for the left breast for which we plan surgery upfront for the 8 mm size of tumor triple negative she will need chemo regardless.  If the left breast lesion ends up less than 1 cm node-negative then we would give her Taxotere Cytoxan x 4.    If the left breast lesion is over a centimeter but less than 2 cm, then I would add Adriamycin.  If the left breast lesion is over 2 cm pathologically or node positive, then I would add carboplatin and Keytruda.  For the right breast cancer I would give her at least 5 and, if she is  tolerating, 10 years of hormone blockade.  She had gone 1 year without menses but just started spotting.  I contacted Dr. Ramirez who will see her in [facility] office today for pelvic exam and to get hormone levels and to get ultrasound scheduled.  She has COPD without pulmonary function since prior to her surgery for bariatric surgery in 2019.  She smoked until November.  She is not requiring oxygen and uses her inhalers efficiently without major symptoms.  I spoke with Dr. QUAN and he is comfortable with proceeding without pulmonary function tests and he will get preoperative ABG.  I will see her back in about a month which will give her time to hopefully have healed from her surgery and we can then make plans for adjuvant therapy based upon the above algorithm.  She will need a port but Dr. QUAN does not want to place this during the time of surgery but he will make arrangements to do that postoperatively.    -3/21/2024 evacuation left mastectomy hematomaWith bilateral skin sparing mastectomies and bilateral sentinel node injection and biopsies.  Left breast mastectomy pathology shows no residual carcinoma and 1 benign sentinel node.  Pathologic T1 a N0 KY weakly positive essentially triple negative  Right breast mastectomy pathology shows 1.1 cm grade 3 invasive ductal carcinoma, 2 benign sentinel nodes pathologic T1c N0 ER/KY positive HER2/nabila negative    -4/2/2024 [facility] medical oncology follow-up: I reviewed the above bilateral mastectomy pathology reports with her.  Wounds are healing well though still with significant healing yet to do and still has drains in place and is due to see Dr. QUAN tomorrow.  Her left breast had no residual tumor and no lymph node involvement for a pathological T1a N0 weakly KY positive essentially triple negative breast cancer for which I have placed orders for Taxotere Cytoxan x 4.  For her right breast mastectomy which is healing well, she had a 1.1 cm grade 3 invasive ductal  carcinoma with 2 benign sentinel nodes pathological T1c N0 ER and KS positive HER2/nabila negative for which I will treat her with Arimidex x 10 years following her chemotherapy.  We will get her to Dr. QUAN for port placement and she will have chemo preparation visit in our [place] office and we will start treatment in 2 weeks assuming Dr. QUAN has cleared her surgically.  No need for radiation given bilateral mastectomies.  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    -4/9/2024 chemotherapy education preparation and needs assessment completed    -4/12/2024 Genetic testing was negative for pathogenic mutations in BRCA1/2 and 46 additional genes on the Common Hereditary Cancers panel.       Malignant neoplasm of overlapping sites of both breasts in female, estrogen receptor positive   5/1/2024 -  Chemotherapy    OP CENTRAL VENOUS ACCESS DEVICE Access, Care, and Maintenance (CVAD)     5/2/2024 -  Chemotherapy    OP BREAST TC DOCEtaxel / Cyclophosphamide     Malignant neoplasm of overlapping sites of right female breast   3/21/2024 Initial Diagnosis    Malignant neoplasm of overlapping sites of right female breast     5/2/2024 -  Chemotherapy    OP BREAST TC DOCEtaxel / Cyclophosphamide         HISTORY OF PRESENT ILLNESS:  The patient is a 48 y.o. female, here for follow up on management of bilateral breast cancer, 1 was ER positive, the other was triple negative.  She is now ready to begin adjuvant chemotherapy today after being cleared by Dr. Loredo last week.  She still has her stitches and place and has an appointment to have those removed next week.  She had drainage of a seroma on the 24th.  She is taking her dexamethasone pretreatment as prescribed.  She has her port in place and used EMLA cream this morning.  .    Past Medical History:   Diagnosis Date    Asthma     Breast cancer     COPD (chronic obstructive pulmonary disease)     Depression     history     Past  "Surgical History:   Procedure Laterality Date    ENDOSCOPY      EGD x2    GASTRECTOMY      SLEEVE    HEMATOMA EVACUATION TRUNK Bilateral 3/21/2024    Procedure: HEMATOMA EVACUATION TRUNK;  Surgeon: Phuong Loredo MD;  Location: [facility] OR;  Service: General;  Laterality: Bilateral;    MASTECTOMY W/ SENTINEL NODE BIOPSY Bilateral 3/21/2024    Procedure: BREAST MASTECTOMY WITH SENTINEL NODE BIOPSY BILATERAL;  Surgeon: Phuong Loredo MD;  Location: [facility] OR;  Service: General;  Laterality: Bilateral;       No Known Allergies    Family History and Social History reviewed and changed as necessary    REVIEW OF SYSTEM:   Mild anxiety over getting treatment started    PHYSICAL EXAM:  Well-developed, well-nourished appearing female in no distress, mildly anxious  Chest wall exam reveals well-healed mastectomy incisions with sutures in place, no erythema or signs of infection.  Port in place in right chest wall  Respirations regular and unlabored    Vitals:    05/02/24 0956   BP: 123/88   Pulse: 113   Resp: 18   Temp: 98.3 °F (36.8 °C)   SpO2: 98%   Weight: 90.7 kg (200 lb)   Height: 161.3 cm (63.5\")     Vitals:    05/02/24 0956   PainSc: 0-No pain          ECOG score: 0           Vitals reviewed.  Labs reviewed    ECOG: (0) Fully Active - Able to Carry On All Pre-disease Performance Without Restriction    Lab Results   Component Value Date    HGB 9.4 (L) 05/01/2024    HCT 31.3 (L) 05/01/2024    MCV 79.2 05/01/2024     (H) 05/01/2024    WBC 11.61 (H) 05/01/2024    NEUTROABS 10.32 (H) 05/01/2024    LYMPHSABS 0.70 05/01/2024    MONOSABS 0.20 05/01/2024    EOSABS 0.27 05/01/2024    BASOSABS 0.05 05/01/2024       Lab Results   Component Value Date    GLUCOSE 92 05/01/2024    BUN 14 05/01/2024    CREATININE 0.81 05/01/2024     05/01/2024    K 5.1 05/01/2024     05/01/2024    CO2 25.9 05/01/2024    CALCIUM 9.0 05/01/2024    PROTEINTOT 6.2 04/05/2024    ALBUMIN 3.7 05/01/2024    BILITOT 0.3 " 05/01/2024    ALKPHOS 115 05/01/2024    AST 9 05/01/2024    ALT 6 05/01/2024             ASSESSMENT & PLAN:  1.  Bilateral breast cancer:  Right side invasive ductal clinical T1c N0, 1.4 centimeter, ER positive UT positive HER2 negative, grade 2.  Pathologic 1.1 cm T1c N0 on mastectomy  Left side invasive ductal clinical T1b N0, 8 MM, ER negative, UT weakly positive, HER2/nabila negative, essentially triple negative grade 3.  Pathologic T1 a N0 with no residual tumor on mastectomy  Initial bilateral mastectomy recommended.    2.  COPD    Oncology history timeline:  -1/11/2024 bilateral screening mammogram [facility] read [facility] imaging showed asymmetry right breast additional views needed with mass in left breast requiring additional imaging  -2/1/2024 bilateral diagnostic mammogram [facility] showed persistent focal asymmetry right 12:00 7 cm from the nipple 7 mm hypoechoic mass indistinct with irregular margins ultrasound-guided biopsy recommended.  Left breast showed 8 mm 3:00 oval mass 10 cm from the nipple that on the ultrasound was 6 x 5 x 5 mm.  Incidental 4 mm 2:00 hypoechoic mass with internal vascularity 5 cm from the nipple for which ultrasound-guided biopsy recommended  -2/5/2024 right ultrasound-guided core biopsy 7 mm mass with HydroMARK T4 shaped tissue marker placed at the biopsy site.  Left ultrasound breast core biopsy 3:00 6 mm mass and 2:00 4 mm mass with HydroMARK T3 and T4 respectively.  Right breast 12:00 lesion grade 2 invasive ductal Jimenez-Cui 6 out of 9, 4 mm.  % 3+, UT 50% 3+.  Also intermediate grade ductal carcinoma in situ  Left breast 3:00 invasive ductal carcinoma grade 3 Bloom-Cui 9 out of 9, 5-6 mm, ER 0%, UT 25% 1+, HER2/nabila negative 1+  Left breast 2:00 core biopsy fibroadenoma  -2/12/2024 office note Dr. Gregorio Ashton indicates patient with newly diagnosed bilateral breast cancer.  Screening mammogram  showed 2 abnormalities in the left breast and 1 in the right.    No personal or family history of breast cancer.  Has hot flashes but not taking estrogen supplements.  -2/13/2024 cervical spine x-ray negative    -2/15/2024 [facility] medical oncology follow-up: Patient has bilateral breast cancers as outlined on routine screening mammogram without any other symptoms.  After her diagnosis, she has had muscle tension aches in the left shoulder which she has a tens unit and recent injection of steroids by her primary care.  This is distinctly in the muscle she says and not in her bones.2/13/2024 cervical spine x-ray has C7 obscured by overlapping bone and soft tissues but otherwise unremarkable. Recent CBC and CMP had normal bone enzymes and was otherwise unremarkable.  Hence I would not make much out of the neck pain in the way of concern for metastatic disease unless this worsens.   She is under a lot of psychological strain from this diagnosis and is quite anxious and I will refer her to Ashlee Nayak are oncology psychiatrist and we will get our breast cancer navigator working with her.  The 7 mm right breast tumor is grade 2  % 3+, IN 50% 3+, HER2/nabila 0+.  The left breast is clinically 6 mm grade 3 invasive ductal ER negative IN weakly positive HER2/nabila 1 negative essentially triple negative.  We will get MRI of her breasts and if the lesion in the left breast turns out to be larger than 1 cm mammographically or is node positive then we would give neoadjuvant therapy.  If not, both for the 7 mm right breast and the 8 mm left breast tumor I would consider primary resection.  She has just started a job with [employer] in [place] and her insurance changes to [employer] in 2 weeks and she wants her surgery done in the [facility] facility for insurance reasons.  I will discuss this with Dr. Ashton (he was scrubbed when I called today) whose skills I recommended to her but we will make appropriate referrals per her  request.  With her triple negativity she will also get genetic counseling.  Absent any other somatic complaints and assuming her neck continues to improve I would do no additional staging imaging in the absence of such symptoms.  We will also present her at our multidisciplinary tumor board once the MRI is completed for final decision making.She is committed to bilateral mammograms regardless of the results of the MRI or genetic testing.  Genetic testing is done on all triple negatives.She does have a history of gastric sleeve but still needs to lose weight.  She also carries a diagnosis of COPD and I am not sure who has managed that and Dr. QUAN may need her cleared by primary care/pulmonary before surgery but I will leave it to him.  She is not oxygen requiring.    -2/21/2024 bilateral breast MRI:  Right breast 12:00, 8 cm from nipple, 1.4 cm enhancing mass consistent with biopsy and no additional right breast lesions.  Left breast 2:00 5 cm from nipple is 8 mm masslike enhancement with central signal void artifact corresponding to biopsy site.  No additional suspicious sites in the left breast.  No internal mammary nor axillary salazar involvement on either side    -3/1/2024 [facility] medical oncology follow-up: We reviewed her MRI at multidisciplinary tumor board.  For the right breast cancer she would have surgery primarily followed by adjuvant hormone blockade but would not do Oncotype as, for the left breast for which we plan surgery upfront for the 8 mm size of tumor triple negative she will need chemo regardless.  If the left breast lesion ends up less than 1 cm node-negative then we would give her Taxotere Cytoxan x 4.    If the left breast lesion is over a centimeter but less than 2 cm, then I would add Adriamycin.  If the left breast lesion is over 2 cm pathologically or node positive, then I would add carboplatin and Keytruda.  For the right breast cancer I would give her at least 5 and, if she is tolerating,  10 years of hormone blockade.  She had gone 1 year without menses but just started spotting.  I contacted Dr. Ramirez who will see her in [facility] office today for pelvic exam and to get hormone levels and to get ultrasound scheduled.  She has COPD without pulmonary function since prior to her surgery for bariatric surgery in 2019.  She smoked until November.  She is not requiring oxygen and uses her inhalers efficiently without major symptoms.  I spoke with Dr. QUAN and he is comfortable with proceeding without pulmonary function tests and he will get preoperative ABG.  I will see her back in about a month which will give her time to hopefully have healed from her surgery and we can then make plans for adjuvant therapy based upon the above algorithm.  She will need a port but Dr. QUAN does not want to place this during the time of surgery but he will make arrangements to do that postoperatively.    -3/21/2024 evacuation left mastectomy hematomaWith bilateral skin sparing mastectomies and bilateral sentinel node injection and biopsies.  Left breast mastectomy pathology shows no residual carcinoma and 1 benign sentinel node.  Pathologic T1 a N0 RI weakly positive essentially triple negative  Right breast mastectomy pathology shows 1.1 cm grade 3 invasive ductal carcinoma, 2 benign sentinel nodes pathologic T1c N0 ER/RI positive HER2/nabila negative    -4/2/2024 [facility] [place] medical oncology follow-up: I reviewed the above bilateral mastectomy pathology reports with her.  Wounds are healing well though still with significant healing yet to do and still has drains in place and is due to see Dr. QUAN tomorrow.  Her left breast had no residual tumor and no lymph node involvement for a pathological T1a N0 weakly RI positive essentially triple negative breast cancer for which I have placed orders for Taxotere Cytoxan x 4.  For her right breast mastectomy which is healing well, she had a 1.1 cm grade 3 invasive ductal carcinoma  with 2 benign sentinel nodes pathological T1c N0 ER and NM positive HER2/nabila negative for which I will treat her with Arimidex x 10 years following her chemotherapy.  We will get her to Dr. QUAN for port placement and she will have chemo preparation visit in our [place] office and we will start treatment in 2 weeks assuming Dr. QUAN has cleared her surgically.  No need for radiation given bilateral mastectomies.  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    -5/2/2024 [facility] oncology clinic follow-up: Ashlee is ready to begin adjuvant therapy with TC today for a planned 4 courses.  Her drains have been removed, she still has sutures in place but her mastectomy scars appear well-healed.  I reviewed labs from yesterday, counts acceptable to continue treatment today.  She is taking her dexamethasone that she started yesterday pretreatment for 3 days.  Once she completes chemotherapy we will plan on Arimidex for 10 years.  Her genetic testing was negative.    Return to clinic in 3 weeks for follow-up    I spent 20 minutes caring for Ashlee on this date of service. This time includes time spent by me in the following activities: preparing for the visit, reviewing tests, obtaining and/or reviewing a separately obtained history, performing a medically appropriate examination and/or evaluation, ordering medications, tests, or procedures, and documenting information in the medical record.     Danni Campo, APRN    05/02/2024

## 2024-05-02 NOTE — PROGRESS NOTES
Patient seen today for Cycle 1 Docetaxel / Cyclophosphamide infusions. 1105 Docetaxel initiated. 1118 patient reports severe bilateral low back hip pain. Flushing and diaphoresis noted. Mild abdominal discomfort. Anxiousness. Chemotherapy stopped reaction medications administered. Frequent vital signs obtained. Danni SANDS chairside assessing patient. 1200 Docetaxel restarted half rate. Patient asymptomatic. 1230 Docetaxel infusion increased to original rate. Remainder of infusion well tolerated. Patient stable. No further concerns. EDUARDO CARCAMO RN

## 2024-05-09 ENCOUNTER — TELEPHONE (OUTPATIENT)
Dept: ONCOLOGY | Facility: CLINIC | Age: 49
End: 2024-05-09
Payer: COMMERCIAL

## 2024-05-09 ENCOUNTER — HOSPITAL ENCOUNTER (EMERGENCY)
Facility: HOSPITAL | Age: 49
Discharge: HOME | End: 2024-05-10
Attending: EMERGENCY MEDICINE
Payer: COMMERCIAL

## 2024-05-09 DIAGNOSIS — T45.1X5A CHEMOTHERAPY INDUCED NAUSEA AND VOMITING: Primary | ICD-10-CM

## 2024-05-09 DIAGNOSIS — R11.2 CHEMOTHERAPY INDUCED NAUSEA AND VOMITING: Primary | ICD-10-CM

## 2024-05-09 LAB
ALBUMIN SERPL-MCNC: 3.4 G/DL (ref 3.5–5.2)
ALBUMIN/GLOB SERPL: 1.2 G/DL
ALP SERPL-CCNC: 84 U/L (ref 39–117)
ALT SERPL W P-5'-P-CCNC: 5 U/L (ref 1–33)
ANION GAP SERPL CALCULATED.3IONS-SCNC: 10 MMOL/L (ref 5–15)
AST SERPL-CCNC: 9 U/L (ref 1–32)
BASOPHILS # BLD MANUAL: 0 10*3/MM3 (ref 0–0.2)
BASOPHILS NFR BLD MANUAL: 0 % (ref 0–1.5)
BILIRUB SERPL-MCNC: 0.4 MG/DL (ref 0–1.2)
BUN SERPL-MCNC: 14 MG/DL (ref 6–20)
BUN/CREAT SERPL: 13.7 (ref 7–25)
CALCIUM SPEC-SCNC: 8.4 MG/DL (ref 8.6–10.5)
CHLORIDE SERPL-SCNC: 93 MMOL/L (ref 98–107)
CO2 SERPL-SCNC: 26 MMOL/L (ref 22–29)
CREAT SERPL-MCNC: 1.02 MG/DL (ref 0.57–1)
DEPRECATED RDW RBC AUTO: 44.1 FL (ref 37–54)
EGFRCR SERPLBLD CKD-EPI 2021: 67.2 ML/MIN/1.73
EOSINOPHIL # BLD MANUAL: 0 10*3/MM3 (ref 0–0.4)
EOSINOPHIL NFR BLD MANUAL: 0 % (ref 0.3–6.2)
ERYTHROCYTE [DISTWIDTH] IN BLOOD BY AUTOMATED COUNT: 15.9 % (ref 12.3–15.4)
GLOBULIN UR ELPH-MCNC: 2.8 GM/DL
GLUCOSE SERPL-MCNC: 104 MG/DL (ref 65–99)
HCT VFR BLD AUTO: 30.4 % (ref 34–46.6)
HGB BLD-MCNC: 9.1 G/DL (ref 12–15.9)
HYPOCHROMIA BLD QL: ABNORMAL
LYMPHOCYTES # BLD MANUAL: 1.9 10*3/MM3 (ref 0.7–3.1)
LYMPHOCYTES NFR BLD MANUAL: 11 % (ref 5–12)
MAGNESIUM SERPL-MCNC: 2 MG/DL (ref 1.6–2.6)
MCH RBC QN AUTO: 23.1 PG (ref 26.6–33)
MCHC RBC AUTO-ENTMCNC: 29.9 G/DL (ref 31.5–35.7)
MCV RBC AUTO: 77.2 FL (ref 79–97)
METAMYELOCYTES NFR BLD MANUAL: 5 % (ref 0–0)
MICROCYTES BLD QL: ABNORMAL
MONOCYTES # BLD: 1.31 10*3/MM3 (ref 0.1–0.9)
NEUTROPHILS # BLD AUTO: 8.07 10*3/MM3 (ref 1.7–7)
NEUTROPHILS NFR BLD MANUAL: 62 % (ref 42.7–76)
NEUTS BAND NFR BLD MANUAL: 6 % (ref 0–5)
NRBC SPEC MANUAL: 0 /100 WBC (ref 0–0.2)
PLAT MORPH BLD: NORMAL
PLATELET # BLD AUTO: 358 10*3/MM3 (ref 140–450)
PMV BLD AUTO: 10.6 FL (ref 6–12)
POTASSIUM SERPL-SCNC: 3.7 MMOL/L (ref 3.5–5.2)
PROT SERPL-MCNC: 6.2 G/DL (ref 6–8.5)
RBC # BLD AUTO: 3.94 10*6/MM3 (ref 3.77–5.28)
SODIUM SERPL-SCNC: 129 MMOL/L (ref 136–145)
VARIANT LYMPHS NFR BLD MANUAL: 14 % (ref 19.6–45.3)
VARIANT LYMPHS NFR BLD MANUAL: 2 % (ref 0–5)
WBC MORPH BLD: NORMAL
WBC NRBC COR # BLD AUTO: 11.87 10*3/MM3 (ref 3.4–10.8)

## 2024-05-09 PROCEDURE — 96375 TX/PRO/DX INJ NEW DRUG ADDON: CPT

## 2024-05-09 PROCEDURE — 93005 ELECTROCARDIOGRAM TRACING: CPT

## 2024-05-09 PROCEDURE — 96361 HYDRATE IV INFUSION ADD-ON: CPT

## 2024-05-09 PROCEDURE — 36415 COLL VENOUS BLD VENIPUNCTURE: CPT

## 2024-05-09 PROCEDURE — 83735 ASSAY OF MAGNESIUM: CPT | Performed by: PHYSICIAN ASSISTANT

## 2024-05-09 PROCEDURE — 25010000002 ONDANSETRON PER 1 MG: Performed by: PHYSICIAN ASSISTANT

## 2024-05-09 PROCEDURE — 96374 THER/PROPH/DIAG INJ IV PUSH: CPT

## 2024-05-09 PROCEDURE — 85025 COMPLETE CBC W/AUTO DIFF WBC: CPT | Performed by: PHYSICIAN ASSISTANT

## 2024-05-09 PROCEDURE — 25810000003 SODIUM CHLORIDE 0.9 % SOLUTION: Performed by: PHYSICIAN ASSISTANT

## 2024-05-09 PROCEDURE — 80053 COMPREHEN METABOLIC PANEL: CPT | Performed by: PHYSICIAN ASSISTANT

## 2024-05-09 PROCEDURE — 93005 ELECTROCARDIOGRAM TRACING: CPT | Performed by: EMERGENCY MEDICINE

## 2024-05-09 PROCEDURE — 25010000002 KETOROLAC TROMETHAMINE PER 15 MG: Performed by: EMERGENCY MEDICINE

## 2024-05-09 PROCEDURE — 99283 EMERGENCY DEPT VISIT LOW MDM: CPT

## 2024-05-09 RX ORDER — KETOROLAC TROMETHAMINE 15 MG/ML
15 INJECTION, SOLUTION INTRAMUSCULAR; INTRAVENOUS ONCE
Status: COMPLETED | OUTPATIENT
Start: 2024-05-09 | End: 2024-05-09

## 2024-05-09 RX ORDER — ONDANSETRON 2 MG/ML
4 INJECTION INTRAMUSCULAR; INTRAVENOUS ONCE
Status: COMPLETED | OUTPATIENT
Start: 2024-05-09 | End: 2024-05-09

## 2024-05-09 RX ORDER — CYCLOBENZAPRINE HCL 10 MG
10 TABLET ORAL ONCE
Status: COMPLETED | OUTPATIENT
Start: 2024-05-09 | End: 2024-05-09

## 2024-05-09 RX ADMIN — SODIUM CHLORIDE 1000 ML: 9 INJECTION, SOLUTION INTRAVENOUS at 20:47

## 2024-05-09 RX ADMIN — ONDANSETRON 4 MG: 2 INJECTION INTRAMUSCULAR; INTRAVENOUS at 20:47

## 2024-05-09 RX ADMIN — CYCLOBENZAPRINE HYDROCHLORIDE 10 MG: 10 TABLET, FILM COATED ORAL at 21:48

## 2024-05-09 RX ADMIN — SODIUM CHLORIDE 1000 ML: 9 INJECTION, SOLUTION INTRAVENOUS at 22:31

## 2024-05-09 RX ADMIN — KETOROLAC TROMETHAMINE 15 MG: 15 INJECTION, SOLUTION INTRAMUSCULAR; INTRAVENOUS at 21:30

## 2024-05-10 VITALS
RESPIRATION RATE: 20 BRPM | HEIGHT: 63 IN | WEIGHT: 200 LBS | TEMPERATURE: 99.7 F | SYSTOLIC BLOOD PRESSURE: 91 MMHG | OXYGEN SATURATION: 94 % | BODY MASS INDEX: 35.44 KG/M2 | HEART RATE: 94 BPM | DIASTOLIC BLOOD PRESSURE: 63 MMHG

## 2024-05-10 LAB
QT INTERVAL: 312 MS
QTC INTERVAL: 450 MS

## 2024-05-10 PROCEDURE — 25010000002 HEPARIN LOCK FLUSH PER 10 UNITS: Performed by: EMERGENCY MEDICINE

## 2024-05-10 RX ORDER — HEPARIN SODIUM (PORCINE) LOCK FLUSH IV SOLN 100 UNIT/ML 100 UNIT/ML
5 SOLUTION INTRAVENOUS AS NEEDED
Status: DISCONTINUED | OUTPATIENT
Start: 2024-05-10 | End: 2024-05-10 | Stop reason: HOSPADM

## 2024-05-10 RX ADMIN — HEPARIN 500 UNITS: 100 SYRINGE at 00:27

## 2024-05-10 NOTE — ED PROVIDER NOTES
Subjective  History of Present Illness:    Chief Complaint: Nausea vomiting, weakness  History of Present Illness: 48-year-old female presents with nausea, vomiting, weakness.  She started chemotherapy 1 week ago, she has a history of bilateral breast cancer, status post bilateral mastectomy.  She also has safety of COPD, and depression.  She has a port, and began chemo 1 week ago, she began to have nausea vomiting 3 days ago as well as weakness and decreased appetite.  She is concerned about dehydration.  Onset: Sudden onset  Duration: 3 days ago  Exacerbating / Alleviating factors: Began her chemo treatment 1 week ago  Associated symptoms: Weakness      Nurses Notes reviewed and agree, including vitals, allergies, social history and prior medical history.     REVIEW OF SYSTEMS: All systems reviewed and not pertinent unless noted.    Review of Systems   Gastrointestinal:  Positive for nausea and vomiting.   Neurological:  Positive for weakness.   All other systems reviewed and are negative.      Past Medical History:   Diagnosis Date    Asthma     Breast cancer     COPD (chronic obstructive pulmonary disease)     Depression     history       Allergies:    Patient has no known allergies.      Past Surgical History:   Procedure Laterality Date    ENDOSCOPY      EGD x2    GASTRECTOMY      SLEEVE    HEMATOMA EVACUATION TRUNK Bilateral 3/21/2024    Procedure: HEMATOMA EVACUATION TRUNK;  Surgeon: Phuong Loredo MD;  Location: [facility];  Service: General;  Laterality: Bilateral;    MASTECTOMY W/ SENTINEL NODE BIOPSY Bilateral 3/21/2024    Procedure: BREAST MASTECTOMY WITH SENTINEL NODE BIOPSY BILATERAL;  Surgeon: Phuong Loredo MD;  Location: [facility];  Service: General;  Laterality: Bilateral;         Social History     Socioeconomic History    Marital status: Single   Tobacco Use    Smoking status: Every Day     Current packs/day: 0.00     Average packs/day: 0.5 packs/day for 33.8 years (16.9 ttl pk-yrs)  "    Types: Cigarettes     Start date: 1990     Last attempt to quit: 2023     Years since quittin.5    Smokeless tobacco: Never   Vaping Use    Vaping status: Never Used   Substance and Sexual Activity    Alcohol use: Yes    Drug use: Not Currently    Sexual activity: Yes     Partners: Male         Family History   Problem Relation Age of Onset    Diabetes Other     COPD Other        Objective  Physical Exam:  BP 91/63 (BP Location: Right arm, Patient Position: Lying)   Pulse 94   Temp 99.7 °F (37.6 °C) (Oral)   Resp 20   Ht 160 cm (63\")   Wt 90.7 kg (200 lb)   SpO2 94%   BMI 35.43 kg/m²      Physical Exam  Vitals and nursing note reviewed.   Constitutional:       Appearance: She is well-developed.   HENT:      Head: Normocephalic and atraumatic.   Cardiovascular:      Rate and Rhythm: Normal rate and regular rhythm.   Pulmonary:      Effort: Pulmonary effort is normal.      Breath sounds: Normal breath sounds.   Musculoskeletal:        Arms:       Cervical back: Normal range of motion and neck supple.      Comments: Incision sites clean dry and intact from bilateral mastectomy   Skin:     General: Skin is warm and dry.   Neurological:      Mental Status: She is alert and oriented to person, place, and time.      Deep Tendon Reflexes: Reflexes are normal and symmetric.           Procedures    ED Course:    ED Course as of 05/10/24 0228   Thu May 09, 2024   2032 Review of previous  non ED visits, prior labs, prior imaging, available notes from prior evaluations or visits with specialists, medication list, allergies, past medical history, past surgical history     [CS]   2332 Patient reports her symptoms are resolved she feels much better, is requesting discharge home will follow-up with oncology [CS]   2332 I Personally reviewed all laboratory studies performed in the emergency department  [CS]      ED Course User Index  [CS] Kyree Lawson Jr., SANDRA       Lab Results (last 24 hours)       " Procedure Component Value Units Date/Time    CBC Auto Differential [[id number]]  (Abnormal) Collected: 05/09/24 2048    Specimen: Blood Updated: 05/09/24 2243     WBC 11.87 10*3/mm3      RBC 3.94 10*6/mm3      Hemoglobin 9.1 g/dL      Hematocrit 30.4 %      MCV 77.2 fL      MCH 23.1 pg      MCHC 29.9 g/dL      RDW 15.9 %      RDW-SD 44.1 fl      MPV 10.6 fL      Platelets 358 10*3/mm3     Narrative:      The previously reported component NRBC is no longer being reported. Previous result was 1.3 /100 WBC (Reference Range: 0.0-0.2 /100 WBC) on 5/9/2024 at 2116 EDT.    Comprehensive Metabolic Panel [[id number]]  (Abnormal) Collected: 05/09/24 2048    Specimen: Blood Updated: 05/09/24 2139     Glucose 104 mg/dL      BUN 14 mg/dL      Creatinine 1.02 mg/dL      Sodium 129 mmol/L      Potassium 3.7 mmol/L      Chloride 93 mmol/L      CO2 26.0 mmol/L      Calcium 8.4 mg/dL      Total Protein 6.2 g/dL      Albumin 3.4 g/dL      ALT (SGPT) 5 U/L      AST (SGOT) 9 U/L      Alkaline Phosphatase 84 U/L      Total Bilirubin 0.4 mg/dL      Globulin 2.8 gm/dL      Comment: Calculated Result        A/G Ratio 1.2 g/dL      BUN/Creatinine Ratio 13.7     Anion Gap 10.0 mmol/L      eGFR 67.2 mL/min/1.73     Narrative:      GFR Normal >60  Chronic Kidney Disease <60  Kidney Failure <15      Magnesium [[id number]]  (Normal) Collected: 05/09/24 2048    Specimen: Blood Updated: 05/09/24 2139     Magnesium 2.0 mg/dL     Manual Differential [[id number]]  (Abnormal) Collected: 05/09/24 2048    Specimen: Blood Updated: 05/09/24 2243     Neutrophil % 62.0 %      Lymphocyte % 14.0 %      Monocyte % 11.0 %      Eosinophil % 0.0 %      Basophil % 0.0 %      Bands %  6.0 %      Metamyelocyte % 5.0 %      Atypical Lymphocyte % 2.0 %      Neutrophils Absolute 8.07 10*3/mm3      Lymphocytes Absolute 1.90 10*3/mm3      Monocytes Absolute 1.31 10*3/mm3      Eosinophils Absolute 0.00 10*3/mm3      Basophils Absolute 0.00 10*3/mm3      nRBC 0.0 /100 WBC       Hypochromia Slight/1+     Microcytes Slight/1+     WBC Morphology Normal     Platelet Morphology Normal             No radiology results from the last 24 hrs       Medical Decision Making  Patient Presentation 48-year-old female presented with nausea vomiting, and weakness after chemotherapy    DDX dehydration, electrolyte abnormality, intractable nausea vomiting, infection    Data Review/ Non ED Records /Analysis/Ordering unique tests  Review of previous  non ED visits, prior labs, prior imaging, available notes from prior evaluations or visits with specialists, medication list, allergies, past medical history, past surgical history        Independent Review Studies  I Personally reviewed all laboratory studies performed in the emergency department     Intervention/Re-evaluation intervention included multiple fluid boluses, on reevaluation her symptoms were improved    Independent Clinician no consultation    Risk Stratification tools/clinical decision rules patient presented with nausea vomiting related to chemotherapy, felt weak, showed signs of dehydration, she did have hyponatremia, but received 2 fluid boluses which improved her symptoms she felt well to go home, she has appropriate follow-up and access to an oncologist and she was given signs symptoms look for to return.    Shared Decision Making discussed this with patient and family they were    Disposition patient stable for discharge    Problems Addressed:  Chemotherapy induced nausea and vomiting: complicated acute illness or injury    Amount and/or Complexity of Data Reviewed  External Data Reviewed: labs, radiology and notes.  Labs: ordered. Decision-making details documented in ED Course.  ECG/medicine tests: ordered.    Risk  Prescription drug management.          Final diagnoses:   Chemotherapy induced nausea and vomiting           Disposition discharged       Kyree Lawson Jr., SANDRA  05/10/24 0228

## 2024-05-14 LAB
QT INTERVAL: 312 MS
QTC INTERVAL: 450 MS

## 2024-05-17 ENCOUNTER — PATIENT OUTREACH (OUTPATIENT)
Dept: CASE MANAGEMENT | Facility: OTHER | Age: 49
End: 2024-05-17
Payer: COMMERCIAL

## 2024-05-21 ENCOUNTER — INFUSION (OUTPATIENT)
Dept: ONCOLOGY | Facility: HOSPITAL | Age: 49
End: 2024-05-21
Payer: COMMERCIAL

## 2024-05-21 DIAGNOSIS — Z17.1 MALIGNANT NEOPLASM OF OVERLAPPING SITES OF RIGHT BREAST IN FEMALE, ESTROGEN RECEPTOR NEGATIVE: ICD-10-CM

## 2024-05-21 DIAGNOSIS — C50.811 MALIGNANT NEOPLASM OF OVERLAPPING SITES OF BOTH BREASTS IN FEMALE, ESTROGEN RECEPTOR POSITIVE: Primary | ICD-10-CM

## 2024-05-21 DIAGNOSIS — Z17.0 MALIGNANT NEOPLASM OF OVERLAPPING SITES OF BOTH BREASTS IN FEMALE, ESTROGEN RECEPTOR POSITIVE: Primary | ICD-10-CM

## 2024-05-21 DIAGNOSIS — C50.812 MALIGNANT NEOPLASM OF OVERLAPPING SITES OF BOTH BREASTS IN FEMALE, ESTROGEN RECEPTOR POSITIVE: Primary | ICD-10-CM

## 2024-05-21 DIAGNOSIS — C50.811 MALIGNANT NEOPLASM OF OVERLAPPING SITES OF RIGHT BREAST IN FEMALE, ESTROGEN RECEPTOR NEGATIVE: ICD-10-CM

## 2024-05-21 PROCEDURE — 36591 DRAW BLOOD OFF VENOUS DEVICE: CPT

## 2024-05-21 PROCEDURE — 25010000002 HEPARIN LOCK FLUSH PER 10 UNITS: Performed by: INTERNAL MEDICINE

## 2024-05-21 RX ORDER — HEPARIN SODIUM (PORCINE) LOCK FLUSH IV SOLN 100 UNIT/ML 100 UNIT/ML
500 SOLUTION INTRAVENOUS AS NEEDED
OUTPATIENT
Start: 2024-05-21

## 2024-05-21 RX ORDER — SODIUM CHLORIDE 0.9 % (FLUSH) 0.9 %
20 SYRINGE (ML) INJECTION AS NEEDED
OUTPATIENT
Start: 2024-05-21

## 2024-05-21 RX ORDER — HEPARIN SODIUM (PORCINE) LOCK FLUSH IV SOLN 100 UNIT/ML 100 UNIT/ML
500 SOLUTION INTRAVENOUS AS NEEDED
Status: DISCONTINUED | OUTPATIENT
Start: 2024-05-21 | End: 2024-05-21 | Stop reason: HOSPADM

## 2024-05-21 RX ADMIN — HEPARIN 500 UNITS: 100 SYRINGE at 11:10

## 2024-05-22 ENCOUNTER — TELEPHONE (OUTPATIENT)
Dept: ONCOLOGY | Facility: CLINIC | Age: 49
End: 2024-05-22
Payer: COMMERCIAL

## 2024-05-22 LAB
ALBUMIN SERPL-MCNC: 3.3 G/DL (ref 3.5–5.2)
ALBUMIN/GLOB SERPL: 1.5 G/DL
ALP SERPL-CCNC: 87 U/L (ref 39–117)
ALT SERPL-CCNC: 8 U/L (ref 1–33)
AST SERPL-CCNC: 17 U/L (ref 1–32)
BASOPHILS # BLD AUTO: 0.08 10*3/MM3 (ref 0–0.2)
BASOPHILS NFR BLD AUTO: 0.9 % (ref 0–1.5)
BILIRUB SERPL-MCNC: <0.2 MG/DL (ref 0–1.2)
BUN SERPL-MCNC: 11 MG/DL (ref 6–20)
BUN/CREAT SERPL: 12.1 (ref 7–25)
CALCIUM SERPL-MCNC: 8.8 MG/DL (ref 8.6–10.5)
CHLORIDE SERPL-SCNC: 102 MMOL/L (ref 98–107)
CO2 SERPL-SCNC: 23.8 MMOL/L (ref 22–29)
CREAT SERPL-MCNC: 0.91 MG/DL (ref 0.57–1)
EGFRCR SERPLBLD CKD-EPI 2021: 77 ML/MIN/1.73
EOSINOPHIL # BLD AUTO: 0.02 10*3/MM3 (ref 0–0.4)
EOSINOPHIL NFR BLD AUTO: 0.2 % (ref 0.3–6.2)
ERYTHROCYTE [DISTWIDTH] IN BLOOD BY AUTOMATED COUNT: 17.4 % (ref 12.3–15.4)
GLOBULIN SER CALC-MCNC: 2.2 GM/DL
GLUCOSE SERPL-MCNC: 89 MG/DL (ref 65–99)
HCT VFR BLD AUTO: 28.4 % (ref 34–46.6)
HGB BLD-MCNC: 8.3 G/DL (ref 12–15.9)
IMM GRANULOCYTES # BLD AUTO: 0.04 10*3/MM3 (ref 0–0.05)
IMM GRANULOCYTES NFR BLD AUTO: 0.5 % (ref 0–0.5)
LYMPHOCYTES # BLD AUTO: 1.76 10*3/MM3 (ref 0.7–3.1)
LYMPHOCYTES NFR BLD AUTO: 20.2 % (ref 19.6–45.3)
MCH RBC QN AUTO: 23.2 PG (ref 26.6–33)
MCHC RBC AUTO-ENTMCNC: 29.2 G/DL (ref 31.5–35.7)
MCV RBC AUTO: 79.6 FL (ref 79–97)
MONOCYTES # BLD AUTO: 0.64 10*3/MM3 (ref 0.1–0.9)
MONOCYTES NFR BLD AUTO: 7.3 % (ref 5–12)
NEUTROPHILS # BLD AUTO: 6.17 10*3/MM3 (ref 1.7–7)
NEUTROPHILS NFR BLD AUTO: 70.9 % (ref 42.7–76)
NRBC BLD AUTO-RTO: 0 /100 WBC (ref 0–0.2)
PLATELET # BLD AUTO: 844 10*3/MM3 (ref 140–450)
POTASSIUM SERPL-SCNC: 5.3 MMOL/L (ref 3.5–5.2)
PROT SERPL-MCNC: 5.5 G/DL (ref 6–8.5)
RBC # BLD AUTO: 3.57 10*6/MM3 (ref 3.77–5.28)
SODIUM SERPL-SCNC: 138 MMOL/L (ref 136–145)
WBC # BLD AUTO: 8.71 10*3/MM3 (ref 3.4–10.8)

## 2024-05-22 NOTE — TELEPHONE ENCOUNTER
I discussed the patient's previous reaction to Taxotere with Dr. Youngblood.  She had fairly sudden onset of severe back pain, flushing and diaphoresis with her first cycle of TC.  When I spoke to the patient today she also reported that 4 days later she experienced the same back pain that lasted for about an hour.  We will hold treatment for now and will order Oncotype on her right breast tissue, if her Oncotype score is low we will not press for further TC, if high risk we will consider resuming with premedication.  Ashlee states understanding and agreement with plan.  
Daily Assessment

## 2024-06-12 LAB
CYTO UR: NORMAL
LAB AP CASE REPORT: NORMAL
LAB AP CLINICAL INFORMATION: NORMAL
LAB AP DIAGNOSIS COMMENT: NORMAL
LAB AP GENOMIC HEALTH, ADDENDUM: NORMAL
Lab: NORMAL
PATH REPORT.FINAL DX SPEC: NORMAL
PATH REPORT.GROSS SPEC: NORMAL

## 2024-06-14 ENCOUNTER — TELEMEDICINE (OUTPATIENT)
Dept: ONCOLOGY | Facility: CLINIC | Age: 49
End: 2024-06-14
Payer: COMMERCIAL

## 2024-06-14 DIAGNOSIS — C50.811 MALIGNANT NEOPLASM OF OVERLAPPING SITES OF BOTH BREASTS IN FEMALE, ESTROGEN RECEPTOR POSITIVE: Primary | Chronic | ICD-10-CM

## 2024-06-14 DIAGNOSIS — Z17.0 MALIGNANT NEOPLASM OF OVERLAPPING SITES OF BOTH BREASTS IN FEMALE, ESTROGEN RECEPTOR POSITIVE: Primary | Chronic | ICD-10-CM

## 2024-06-14 DIAGNOSIS — C50.812 MALIGNANT NEOPLASM OF OVERLAPPING SITES OF BOTH BREASTS IN FEMALE, ESTROGEN RECEPTOR POSITIVE: Primary | Chronic | ICD-10-CM

## 2024-06-14 NOTE — PROGRESS NOTES
This was an audio and video enabled telemedicine encounter.  Done for COVID-19 risk reduction.  Verbal consent given.  Patient present at her home in [place] and I am in my office in [place]    CHIEF COMPLAINT: No somatic complaints    Problem List:  Oncology/Hematology History Overview Note   1.  Bilateral breast cancer:  -Right side invasive ductal clinical T1c N0, 1.4 centimeter, ER positive PA positive HER2 negative, grade 2.  Pathologic 1.1 cm T1c N0 on mastectomy.  Recurrence score 44.  Distant recurrence risk on hormone blockade alone 32%. > 15% absolute chemotherapeutic benefit.  -Left side invasive ductal clinical T1b N0, 8 MM, ER negative, PA weakly positive, HER2/nabila negative, essentially triple negative grade 3.  Pathologic T1 a N0 with no residual tumor on mastectomy  Initial bilateral mastectomy recommended.    2.  COPD    Oncology history timeline:  -1/11/2024 bilateral screening mammogram [facility] read [facility] imaging showed asymmetry right breast additional views needed with mass in left breast requiring additional imaging  -2/1/2024 bilateral diagnostic mammogram [place] [facility] showed persistent focal asymmetry right 12:00 7 cm from the nipple 7 mm hypoechoic mass indistinct with irregular margins ultrasound-guided biopsy recommended.  Left breast showed 8 mm 3:00 oval mass 10 cm from the nipple that on the ultrasound was 6 x 5 x 5 mm.  Incidental 4 mm 2:00 hypoechoic mass with internal vascularity 5 cm from the nipple for which ultrasound-guided biopsy recommended  -2/5/2024 right ultrasound-guided core biopsy 7 mm mass with HydroMARK T4 shaped tissue marker placed at the biopsy site.  Left ultrasound breast core biopsy 3:00 6 mm mass and 2:00 4 mm mass with HydroMARK T3 and T4 respectively.  Right breast 12:00 lesion grade 2 invasive ductal Jimenez-Cui 6 out of 9, 4 mm.  % 3+, PA 50% 3+.  Also intermediate grade ductal  carcinoma in situ  Left breast 3:00 invasive ductal carcinoma grade 3 Bloom-Cui 9 out of 9, 5-6 mm, ER 0%, MA 25% 1+, HER2/nabila negative 1+  Left breast 2:00 core biopsy fibroadenoma  -2/12/2024 office note Dr. Gregorio Ashton indicates patient with newly diagnosed bilateral breast cancer.  Screening mammogram showed 2 abnormalities in the left breast and 1 in the right.    No personal or family history of breast cancer.  Has hot flashes but not taking estrogen supplements.  -2/13/2024 cervical spine x-ray negative    -2/15/2024 [facility] medical oncology follow-up: Patient has bilateral breast cancers as outlined on routine screening mammogram without any other symptoms.  After her diagnosis, she has had muscle tension aches in the left shoulder which she has a tens unit and recent injection of steroids by her primary care.  This is distinctly in the muscle she says and not in her bones.2/13/2024 cervical spine x-ray has C7 obscured by overlapping bone and soft tissues but otherwise unremarkable. Recent CBC and CMP had normal bone enzymes and was otherwise unremarkable.  Hence I would not make much out of the neck pain in the way of concern for metastatic disease unless this worsens.   She is under a lot of psychological strain from this diagnosis and is quite anxious and I will refer her to Ashlee Nayak are oncology psychiatrist and we will get our breast cancer navigator working with her.  The 7 mm right breast tumor is grade 2  % 3+, MA 50% 3+, HER2/nabila 0+.  The left breast is clinically 6 mm grade 3 invasive ductal ER negative MA weakly positive HER2/nabila 1 negative essentially triple negative.  We will get MRI of her breasts and if the lesion in the left breast turns out to be larger than 1 cm mammographically or is node positive then we would give neoadjuvant therapy.  If not, both for the 7 mm right breast and the 8 mm left breast tumor I would consider primary resection.  She has just started a job with  [employer] in [place] and her insurance changes to [employer] in 2 weeks and she wants her surgery done in the [facility] facility for insurance reasons.  I will discuss this with Dr. Ashton (he was scrubbed when I called today) whose skills I recommended to her but we will make appropriate referrals per her request.  With her triple negativity she will also get genetic counseling.  Absent any other somatic complaints and assuming her neck continues to improve I would do no additional staging imaging in the absence of such symptoms.  We will also present her at our multidisciplinary tumor board once the MRI is completed for final decision making.She is committed to bilateral mammograms regardless of the results of the MRI or genetic testing.  Genetic testing is done on all triple negatives.She does have a history of gastric sleeve but still needs to lose weight.  She also carries a diagnosis of COPD and I am not sure who has managed that and Dr. QUAN may need her cleared by primary care/pulmonary before surgery but I will leave it to him.  She is not oxygen requiring.    -2/21/2024 bilateral breast MRI:  Right breast 12:00, 8 cm from nipple, 1.4 cm enhancing mass consistent with biopsy and no additional right breast lesions.  Left breast 2:00 5 cm from nipple is 8 mm masslike enhancement with central signal void artifact corresponding to biopsy site.  No additional suspicious sites in the left breast.  No internal mammary nor axillary salazar involvement on either side    -3/1/2024 [facility] medical oncology follow-up: We reviewed her MRI at multidisciplinary tumor board.  For the right breast cancer she would have surgery primarily followed by adjuvant hormone blockade but would not do Oncotype as, for the left breast for which we plan surgery upfront for the 8 mm size of tumor triple negative she will need chemo regardless.  If the left breast lesion ends up less than 1 cm node-negative then we would give her Taxotere  Cytoxan x 4.    If the left breast lesion is over a centimeter but less than 2 cm, then I would add Adriamycin.  If the left breast lesion is over 2 cm pathologically or node positive, then I would add carboplatin and Keytruda.  For the right breast cancer I would give her at least 5 and, if she is tolerating, 10 years of hormone blockade.  She had gone 1 year without menses but just started spotting.  I contacted Dr. Ramirez who will see her in [facility] office today for pelvic exam and to get hormone levels and to get ultrasound scheduled.  She has COPD without pulmonary function since prior to her surgery for bariatric surgery in 2019.  She smoked until November.  She is not requiring oxygen and uses her inhalers efficiently without major symptoms.  I spoke with Dr. QUAN and he is comfortable with proceeding without pulmonary function tests and he will get preoperative ABG.  I will see her back in about a month which will give her time to hopefully have healed from her surgery and we can then make plans for adjuvant therapy based upon the above algorithm.  She will need a port but Dr. QUAN does not want to place this during the time of surgery but he will make arrangements to do that postoperatively.    -3/21/2024 evacuation left mastectomy hematomaWith bilateral skin sparing mastectomies and bilateral sentinel node injection and biopsies.  Left breast mastectomy pathology shows no residual carcinoma and 1 benign sentinel node.  Pathologic T1 a N0 AZ weakly positive essentially triple negative  Right breast mastectomy pathology shows 1.1 cm grade 3 invasive ductal carcinoma, 2 benign sentinel nodes pathologic T1c N0 ER/AZ positive HER2/nabila negative.  Oncotype score 44 distant recurrence risk at 9 years 32%.  Greater than 15% absolute benefit from chemotherapy.    -4/2/2024 [facility] medical oncology follow-up: I reviewed the above bilateral mastectomy pathology reports with her.  Wounds are healing well though  still with significant healing yet to do and still has drains in place and is due to see Dr. QUAN tomorrow.  Her left breast had no residual tumor and no lymph node involvement for a pathological T1a N0 weakly DE positive essentially triple negative breast cancer for which I have placed orders for Taxotere Cytoxan x 4.  For her right breast mastectomy which is healing well, she had a 1.1 cm grade 3 invasive ductal carcinoma with 2 benign sentinel nodes pathological T1c N0 ER and DE positive HER2/nabila negative for which I will treat her with Arimidex x 10 years following her chemotherapy.  We will get her to Dr. QUAN for port placement and she will have chemo preparation visit in our [place] office and we will start treatment in 2 weeks assuming Dr. QUAN has cleared her surgically.  No need for radiation given bilateral mastectomies.  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    -4/9/2024 chemotherapy education preparation and needs assessment completed    -4/12/2024 Genetic testing was negative for pathogenic mutations in BRCA1/2 and 46 additional genes on the Common Hereditary Cancers panel.      -5/2/2024 treatment began with TC for a planned 4 courses    -5/2/2024 [facility] follow-up: Ashlee is ready to begin adjuvant therapy with TC today for a planned 4 courses.  Her drains have been removed, she still has sutures in place but her mastectomy scars appear well-healed.  I reviewed labs from yesterday, counts acceptable to continue treatment today.  She is taking her dexamethasone that she started yesterday pretreatment for 3 days.  Once she completes chemotherapy we will plan on Arimidex for 10 years.  Her genetic testing was negative.      Malignant neoplasm of overlapping sites of both breasts in female, estrogen receptor positive   5/1/2024 -  Chemotherapy    OP CENTRAL VENOUS ACCESS DEVICE Access, Care, and Maintenance (CVAD)     5/2/2024 -  Chemotherapy     OP BREAST TC DOCEtaxel / Cyclophosphamide     Malignant neoplasm of overlapping sites of right female breast   3/21/2024 Initial Diagnosis    Malignant neoplasm of overlapping sites of right female breast     5/2/2024 -  Chemotherapy    OP BREAST TC DOCEtaxel / Cyclophosphamide         HISTORY OF PRESENT ILLNESS:  The patient is a 48 y.o. female, here for follow up on management of adjuvant therapy of breast cancer.  Feeling great    Past Medical History:   Diagnosis Date    Asthma     Breast cancer     COPD (chronic obstructive pulmonary disease)     Depression     history     Past Surgical History:   Procedure Laterality Date    ENDOSCOPY      EGD x2    GASTRECTOMY      SLEEVE    HEMATOMA EVACUATION TRUNK Bilateral 3/21/2024    Procedure: HEMATOMA EVACUATION TRUNK;  Surgeon: Phuong Loredo MD;  Location: [facility] OR;  Service: General;  Laterality: Bilateral;    MASTECTOMY W/ SENTINEL NODE BIOPSY Bilateral 3/21/2024    Procedure: BREAST MASTECTOMY WITH SENTINEL NODE BIOPSY BILATERAL;  Surgeon: Phuong Loredo MD;  Location: [facility] OR;  Service: General;  Laterality: Bilateral;       No Known Allergies    Family History and Social History reviewed and changed as necessary    REVIEW OF SYSTEM:   No somatic complaints    PHYSICAL EXAM:  Alopecic.  No rashes.  No respiratory distress.      ECOG: (0) Fully Active - Able to Carry On All Pre-disease Performance Without Restriction    Lab Results   Component Value Date    HGB 8.3 (L) 05/21/2024    HCT 28.4 (L) 05/21/2024    MCV 79.6 05/21/2024     (H) 05/21/2024    WBC 8.71 05/21/2024    NEUTROABS 6.17 05/21/2024    LYMPHSABS 1.76 05/21/2024    MONOSABS 0.64 05/21/2024    EOSABS 0.02 05/21/2024    BASOSABS 0.08 05/21/2024       Lab Results   Component Value Date    GLUCOSE 89 05/21/2024    BUN 11 05/21/2024    CREATININE 0.91 05/21/2024     05/21/2024    K 5.3 (H) 05/21/2024     05/21/2024    CO2 23.8 05/21/2024    CALCIUM 8.8 05/21/2024     PROTEINTOT 6.2 05/09/2024    ALBUMIN 3.3 (L) 05/21/2024    BILITOT <0.2 05/21/2024    ALKPHOS 87 05/21/2024    AST 17 05/21/2024    ALT 8 05/21/2024             ASSESSMENT & PLAN:  1.  Bilateral breast cancer:  -Right side invasive ductal clinical T1c N0, 1.4 centimeter, ER positive VA positive HER2 negative, grade 2.  Pathologic 1.1 cm T1c N0 on mastectomy.  Recurrence score 44.  Distant recurrence risk on hormone blockade alone 32%. > 15% absolute chemotherapeutic benefit.  -Left side invasive ductal clinical T1b N0, 8 MM, ER negative, VA weakly positive, HER2/nabila negative, essentially triple negative grade 3.  Pathologic T1 a N0 with no residual tumor on mastectomy  Initial bilateral mastectomy recommended.    2.  COPD    Oncology history timeline:  -1/11/2024 bilateral screening mammogram [facility] read [facility] imaging showed asymmetry right breast additional views needed with mass in left breast requiring additional imaging  -2/1/2024 bilateral diagnostic mammogram [place] [facility] showed persistent focal asymmetry right 12:00 7 cm from the nipple 7 mm hypoechoic mass indistinct with irregular margins ultrasound-guided biopsy recommended.  Left breast showed 8 mm 3:00 oval mass 10 cm from the nipple that on the ultrasound was 6 x 5 x 5 mm.  Incidental 4 mm 2:00 hypoechoic mass with internal vascularity 5 cm from the nipple for which ultrasound-guided biopsy recommended  -2/5/2024 right ultrasound-guided core biopsy 7 mm mass with HydroMARK T4 shaped tissue marker placed at the biopsy site.  Left ultrasound breast core biopsy 3:00 6 mm mass and 2:00 4 mm mass with HydroMARK T3 and T4 respectively.  Right breast 12:00 lesion grade 2 invasive ductal Jimenez-Cui 6 out of 9, 4 mm.  % 3+, VA 50% 3+.  Also intermediate grade ductal carcinoma in situ  Left breast 3:00 invasive ductal carcinoma grade 3 Bloom-Cui 9 out of 9, 5-6 mm, ER 0%, VA  25% 1+, HER2/nabila negative 1+  Left breast 2:00 core biopsy fibroadenoma  -2/12/2024 office note Dr. Gregorio Ashton indicates patient with newly diagnosed bilateral breast cancer.  Screening mammogram showed 2 abnormalities in the left breast and 1 in the right.    No personal or family history of breast cancer.  Has hot flashes but not taking estrogen supplements.  -2/13/2024 cervical spine x-ray negative    -2/15/2024 [facility] medical oncology follow-up: Patient has bilateral breast cancers as outlined on routine screening mammogram without any other symptoms.  After her diagnosis, she has had muscle tension aches in the left shoulder which she has a tens unit and recent injection of steroids by her primary care.  This is distinctly in the muscle she says and not in her bones.2/13/2024 cervical spine x-ray has C7 obscured by overlapping bone and soft tissues but otherwise unremarkable. Recent CBC and CMP had normal bone enzymes and was otherwise unremarkable.  Hence I would not make much out of the neck pain in the way of concern for metastatic disease unless this worsens.   She is under a lot of psychological strain from this diagnosis and is quite anxious and I will refer her to Ashlee Nayak are oncology psychiatrist and we will get our breast cancer navigator working with her.  The 7 mm right breast tumor is grade 2  % 3+, NE 50% 3+, HER2/nabila 0+.  The left breast is clinically 6 mm grade 3 invasive ductal ER negative NE weakly positive HER2/nabila 1 negative essentially triple negative.  We will get MRI of her breasts and if the lesion in the left breast turns out to be larger than 1 cm mammographically or is node positive then we would give neoadjuvant therapy.  If not, both for the 7 mm right breast and the 8 mm left breast tumor I would consider primary resection.  She has just started a job with [employer] in [place] and her insurance changes to [employer] in 2 weeks and she wants her surgery done in the a  [facility] for insurance reasons.  I will discuss this with Dr. Ashton (he was scrubbed when I called today) whose skills I recommended to her but we will make appropriate referrals per her request.  With her triple negativity she will also get genetic counseling.  Absent any other somatic complaints and assuming her neck continues to improve I would do no additional staging imaging in the absence of such symptoms.  We will also present her at our multidisciplinary tumor board once the MRI is completed for final decision making.She is committed to bilateral mammograms regardless of the results of the MRI or genetic testing.  Genetic testing is done on all triple negatives.She does have a history of gastric sleeve but still needs to lose weight.  She also carries a diagnosis of COPD and I am not sure who has managed that and Dr. QUAN may need her cleared by primary care/pulmonary before surgery but I will leave it to him.  She is not oxygen requiring.    -2/21/2024 bilateral breast MRI:  Right breast 12:00, 8 cm from nipple, 1.4 cm enhancing mass consistent with biopsy and no additional right breast lesions.  Left breast 2:00 5 cm from nipple is 8 mm masslike enhancement with central signal void artifact corresponding to biopsy site.  No additional suspicious sites in the left breast.  No internal mammary nor axillary salazar involvement on either side    -3/1/2024 [facility] medical oncology follow-up: We reviewed her MRI at multidisciplinary tumor board.  For the right breast cancer she would have surgery primarily followed by adjuvant hormone blockade but would not do Oncotype as, for the left breast for which we plan surgery upfront for the 8 mm size of tumor triple negative she will need chemo regardless.  If the left breast lesion ends up less than 1 cm node-negative then we would give her Taxotere Cytoxan x 4.    If the left breast lesion is over a centimeter but less than 2 cm, then I would add  Adriamycin.  If the left breast lesion is over 2 cm pathologically or node positive, then I would add carboplatin and Keytruda.  For the right breast cancer I would give her at least 5 and, if she is tolerating, 10 years of hormone blockade.  She had gone 1 year without menses but just started spotting.  I contacted Dr. Ramirez who will see her in [facility] office today for pelvic exam and to get hormone levels and to get ultrasound scheduled.  She has COPD without pulmonary function since prior to her surgery for bariatric surgery in 2019.  She smoked until November.  She is not requiring oxygen and uses her inhalers efficiently without major symptoms.  I spoke with Dr. QUAN and he is comfortable with proceeding without pulmonary function tests and he will get preoperative ABG.  I will see her back in about a month which will give her time to hopefully have healed from her surgery and we can then make plans for adjuvant therapy based upon the above algorithm.  She will need a port but Dr. QUAN does not want to place this during the time of surgery but he will make arrangements to do that postoperatively.    -3/21/2024 evacuation left mastectomy hematomaWith bilateral skin sparing mastectomies and bilateral sentinel node injection and biopsies.  Left breast mastectomy pathology shows no residual carcinoma and 1 benign sentinel node.  Pathologic T1 a N0 FL weakly positive essentially triple negative  Right breast mastectomy pathology shows 1.1 cm grade 3 invasive ductal carcinoma, 2 benign sentinel nodes pathologic T1c N0 ER/FL positive HER2/nabila negative.  Oncotype score 44 distant recurrence risk at 9 years 32%.  Greater than 15% absolute benefit from chemotherapy.    -4/2/2024 [facility] medical oncology follow-up: I reviewed the above bilateral mastectomy pathology reports with her.  Wounds are healing well though still with significant healing yet to do and still has drains in place and is due to see Dr. QUAN  tomorrow.  Her left breast had no residual tumor and no lymph node involvement for a pathological T1a N0 weakly OH positive essentially triple negative breast cancer for which I have placed orders for Taxotere Cytoxan x 4.  For her right breast mastectomy which is healing well, she had a 1.1 cm grade 3 invasive ductal carcinoma with 2 benign sentinel nodes pathological T1c N0 ER and OH positive HER2/nabila negative for which I will treat her with Arimidex x 10 years following her chemotherapy.  We will get her to Dr. QUAN for port placement and she will have chemo preparation visit in our [place] office and we will start treatment in 2 weeks assuming Dr. QUAN has cleared her surgically.  No need for radiation given bilateral mastectomies.  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    -4/9/2024 chemotherapy education preparation and needs assessment completed    -4/12/2024 Genetic testing was negative for pathogenic mutations in BRCA1/2 and 46 additional genes on the Common Hereditary Cancers panel.      -5/2/2024 treatment began with TC for a planned 4 courses    -5/2/2024 [facility] oncology clinic follow-up: Ashlee is ready to begin adjuvant therapy with TC today for a planned 4 courses.  Her drains have been removed, she still has sutures in place but her mastectomy scars appear well-healed.  I reviewed labs from yesterday, counts acceptable to continue treatment today.  She is taking her dexamethasone that she started yesterday pretreatment for 3 days.  Once she completes chemotherapy we will plan on Arimidex for 10 years.  Her genetic testing was negative.     - 6/14/2024 [facility] medical oncology telemedicine follow-up: Feels great she received cycle 1 TC on 5/2/2024.  13 minutes into her docetaxel, she reported severe bilateral low back and hip pain flushing and diaphoresis with mild abdominal discomfort and anxiousness.  Reaction medications administered.  Docetaxel restarted  half rate and then increase to original rate and the remainder of the infusion well-tolerated.  She went to the emergency room 5/10/2024 with nausea vomiting and weakness lasting the previous 3 days concerned about dehydration.  5/21/2024 hemoglobin 8.3 platelets 844,000 with MCV 79.6 otherwise normal CBC and differential.  Potassium 5.3 normal creatinine otherwise unremarkable CMP.  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  As she has a very high recurrence score, I would try the Taxotere again with institution of all the rescue meds upfront and 0.5 mg IV Ativan premed.  We will do this next week in our [place] office and if she tolerates that we will see her back 3 weeks later for cycle 3 of 4.    Total time of care today inclusive of time spent today prior to patient's arrival reviewing interval reaction data and ER visits and during visit interviewing her as to signs or symptoms of her disease and management thereof and after visit instituting this plan took 50 minutes patient care time throughout the day today.  Feels great  Corky Youngblood MD    06/14/2024

## 2024-06-14 NOTE — LETTER
June 14, 2024       No Recipients    Patient: Ashlee Marte   YOB: 1975   Date of Visit: 6/14/2024       Dear Wen Todd, SHAVON    Ashlee Marte was in my office today. Below is a copy of my note.    If you have questions, please do not hesitate to call me. I look forward to following Ashlee along with you.         Sincerely,        Corky Youngblood MD        CC:   No Recipients    This was an audio and video enabled telemedicine encounter.  Done for COVID-19 risk reduction.  Verbal consent given.  Patient present at her home in [place] and I am in my office in [place]    CHIEF COMPLAINT: No somatic complaints    Problem List:  Oncology/Hematology History Overview Note   1.  Bilateral breast cancer:  -Right side invasive ductal clinical T1c N0, 1.4 centimeter, ER positive AR positive HER2 negative, grade 2.  Pathologic 1.1 cm T1c N0 on mastectomy.  Recurrence score 44.  Distant recurrence risk on hormone blockade alone 32%. > 15% absolute chemotherapeutic benefit.  -Left side invasive ductal clinical T1b N0, 8 MM, ER negative, AR weakly positive, HER2/nabila negative, essentially triple negative grade 3.  Pathologic T1 a N0 with no residual tumor on mastectomy  Initial bilateral mastectomy recommended.    2.  COPD    Oncology history timeline:  -1/11/2024 bilateral screening mammogram [facility] read [facility] imaging showed asymmetry right breast additional views needed with mass in left breast requiring additional imaging  -2/1/2024 bilateral diagnostic mammogram [facility] showed persistent focal asymmetry right 12:00 7 cm from the nipple 7 mm hypoechoic mass indistinct with irregular margins ultrasound-guided biopsy recommended.  Left breast showed 8 mm 3:00 oval mass 10 cm from the nipple that on the ultrasound was 6 x 5 x 5 mm.  Incidental 4 mm 2:00 hypoechoic mass with internal vascularity 5 cm from the nipple for which  ultrasound-guided biopsy recommended  -2/5/2024 right ultrasound-guided core biopsy 7 mm mass with HydroMARK T4 shaped tissue marker placed at the biopsy site.  Left ultrasound breast core biopsy 3:00 6 mm mass and 2:00 4 mm mass with HydroMARK T3 and T4 respectively.  Right breast 12:00 lesion grade 2 invasive ductal Jimenez-Cui 6 out of 9, 4 mm.  % 3+, CO 50% 3+.  Also intermediate grade ductal carcinoma in situ  Left breast 3:00 invasive ductal carcinoma grade 3 Bloom-Cui 9 out of 9, 5-6 mm, ER 0%, CO 25% 1+, HER2/nabila negative 1+  Left breast 2:00 core biopsy fibroadenoma  -2/12/2024 office note Dr. Gregorio Ashton indicates patient with newly diagnosed bilateral breast cancer.  Screening mammogram showed 2 abnormalities in the left breast and 1 in the right.    No personal or family history of breast cancer.  Has hot flashes but not taking estrogen supplements.  -2/13/2024 cervical spine x-ray negative    -2/15/2024 [facility] medical oncology follow-up: Patient has bilateral breast cancers as outlined on routine screening mammogram without any other symptoms.  After her diagnosis, she has had muscle tension aches in the left shoulder which she has a tens unit and recent injection of steroids by her primary care.  This is distinctly in the muscle she says and not in her bones.2/13/2024 cervical spine x-ray has C7 obscured by overlapping bone and soft tissues but otherwise unremarkable. Recent CBC and CMP had normal bone enzymes and was otherwise unremarkable.  Hence I would not make much out of the neck pain in the way of concern for metastatic disease unless this worsens.   She is under a lot of psychological strain from this diagnosis and is quite anxious and I will refer her to Ashlee Nayak are oncology psychiatrist and we will get our breast cancer navigator working with her.  The 7 mm right breast tumor is grade 2  % 3+, CO 50% 3+, HER2/nabila 0+.  The left breast is clinically 6 mm grade 3  invasive ductal ER negative AK weakly positive HER2/nabila 1 negative essentially triple negative.  We will get MRI of her breasts and if the lesion in the left breast turns out to be larger than 1 cm mammographically or is node positive then we would give neoadjuvant therapy.  If not, both for the 7 mm right breast and the 8 mm left breast tumor I would consider primary resection.  She has just started a job with [employer] in [place] and her insurance changes to [facility] in 2 weeks and she wants her surgery done in the [facility] facility for insurance reasons.  I will discuss this with Dr. Ashton (he was scrubbed when I called today) whose skills I recommended to her but we will make appropriate referrals per her request.  With her triple negativity she will also get genetic counseling.  Absent any other somatic complaints and assuming her neck continues to improve I would do no additional staging imaging in the absence of such symptoms.  We will also present her at our multidisciplinary tumor board once the MRI is completed for final decision making.She is committed to bilateral mammograms regardless of the results of the MRI or genetic testing.  Genetic testing is done on all triple negatives.She does have a history of gastric sleeve but still needs to lose weight.  She also carries a diagnosis of COPD and I am not sure who has managed that and Dr. QUAN may need her cleared by primary care/pulmonary before surgery but I will leave it to him.  She is not oxygen requiring.    -2/21/2024 bilateral breast MRI:  Right breast 12:00, 8 cm from nipple, 1.4 cm enhancing mass consistent with biopsy and no additional right breast lesions.  Left breast 2:00 5 cm from nipple is 8 mm masslike enhancement with central signal void artifact corresponding to biopsy site.  No additional suspicious sites in the left breast.  No internal mammary nor axillary salazar involvement on either side    -3/1/2024 [facility] medical oncology  follow-up: We reviewed her MRI at multidisciplinary tumor board.  For the right breast cancer she would have surgery primarily followed by adjuvant hormone blockade but would not do Oncotype as, for the left breast for which we plan surgery upfront for the 8 mm size of tumor triple negative she will need chemo regardless.  If the left breast lesion ends up less than 1 cm node-negative then we would give her Taxotere Cytoxan x 4.    If the left breast lesion is over a centimeter but less than 2 cm, then I would add Adriamycin.  If the left breast lesion is over 2 cm pathologically or node positive, then I would add carboplatin and Keytruda.  For the right breast cancer I would give her at least 5 and, if she is tolerating, 10 years of hormone blockade.  She had gone 1 year without menses but just started spotting.  I contacted Dr. Ramirez who will see her in [facility] office today for pelvic exam and to get hormone levels and to get ultrasound scheduled.  She has COPD without pulmonary function since prior to her surgery for bariatric surgery in 2019.  She smoked until November.  She is not requiring oxygen and uses her inhalers efficiently without major symptoms.  I spoke with Dr. QUAN and he is comfortable with proceeding without pulmonary function tests and he will get preoperative ABG.  I will see her back in about a month which will give her time to hopefully have healed from her surgery and we can then make plans for adjuvant therapy based upon the above algorithm.  She will need a port but Dr. QUAN does not want to place this during the time of surgery but he will make arrangements to do that postoperatively.    -3/21/2024 evacuation left mastectomy hematomaWith bilateral skin sparing mastectomies and bilateral sentinel node injection and biopsies.  Left breast mastectomy pathology shows no residual carcinoma and 1 benign sentinel node.  Pathologic T1 a N0 ND weakly positive essentially triple negative  Right  breast mastectomy pathology shows 1.1 cm grade 3 invasive ductal carcinoma, 2 benign sentinel nodes pathologic T1c N0 ER/AK positive HER2/nabila negative.  Oncotype score 44 distant recurrence risk at 9 years 32%.  Greater than 15% absolute benefit from chemotherapy.    -4/2/2024 [facility] medical oncology follow-up: I reviewed the above bilateral mastectomy pathology reports with her.  Wounds are healing well though still with significant healing yet to do and still has drains in place and is due to see Dr. QUAN tomorrow.  Her left breast had no residual tumor and no lymph node involvement for a pathological T1a N0 weakly AK positive essentially triple negative breast cancer for which I have placed orders for Taxotere Cytoxan x 4.  For her right breast mastectomy which is healing well, she had a 1.1 cm grade 3 invasive ductal carcinoma with 2 benign sentinel nodes pathological T1c N0 ER and AK positive HER2/nabila negative for which I will treat her with Arimidex x 10 years following her chemotherapy.  We will get her to Dr. QUAN for port placement and she will have chemo preparation visit in our [place] office and we will start treatment in 2 weeks assuming Dr. QUAN has cleared her surgically.  No need for radiation given bilateral mastectomies.  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    -4/9/2024 chemotherapy education preparation and needs assessment completed    -4/12/2024 Genetic testing was negative for pathogenic mutations in BRCA1/2 and 46 additional genes on the Common Hereditary Cancers panel.      -5/2/2024 treatment began with TC for a planned 4 courses    -5/2/2024 [facility] oncology clinic follow-up: Ashlee is ready to begin adjuvant therapy with TC today for a planned 4 courses.  Her drains have been removed, she still has sutures in place but her mastectomy scars appear well-healed.  I reviewed labs from yesterday, counts acceptable to continue treatment today.  She is  taking her dexamethasone that she started yesterday pretreatment for 3 days.  Once she completes chemotherapy we will plan on Arimidex for 10 years.  Her genetic testing was negative.      Malignant neoplasm of overlapping sites of both breasts in female, estrogen receptor positive   5/1/2024 -  Chemotherapy    OP CENTRAL VENOUS ACCESS DEVICE Access, Care, and Maintenance (CVAD)     5/2/2024 -  Chemotherapy    OP BREAST TC DOCEtaxel / Cyclophosphamide     Malignant neoplasm of overlapping sites of right female breast   3/21/2024 Initial Diagnosis    Malignant neoplasm of overlapping sites of right female breast     5/2/2024 -  Chemotherapy    OP BREAST TC DOCEtaxel / Cyclophosphamide         HISTORY OF PRESENT ILLNESS:  The patient is a 48 y.o. female, here for follow up on management of adjuvant therapy of breast cancer.  Feeling great    Past Medical History:   Diagnosis Date   • Asthma    • Breast cancer    • COPD (chronic obstructive pulmonary disease)    • Depression     history     Past Surgical History:   Procedure Laterality Date   • ENDOSCOPY      EGD x2   • GASTRECTOMY      SLEEVE   • HEMATOMA EVACUATION TRUNK Bilateral 3/21/2024    Procedure: HEMATOMA EVACUATION TRUNK;  Surgeon: Phuong Loredo MD;  Location: [facility] OR;  Service: General;  Laterality: Bilateral;   • MASTECTOMY W/ SENTINEL NODE BIOPSY Bilateral 3/21/2024    Procedure: BREAST MASTECTOMY WITH SENTINEL NODE BIOPSY BILATERAL;  Surgeon: Phuong Loredo MD;  Location: [facility] OR;  Service: General;  Laterality: Bilateral;       No Known Allergies    Family History and Social History reviewed and changed as necessary    REVIEW OF SYSTEM:   No somatic complaints    PHYSICAL EXAM:  Alopecic.  No rashes.  No respiratory distress.      ECOG: (0) Fully Active - Able to Carry On All Pre-disease Performance Without Restriction    Lab Results   Component Value Date    HGB 8.3 (L) 05/21/2024    HCT 28.4 (L) 05/21/2024    MCV 79.6 05/21/2024      (H) 05/21/2024    WBC 8.71 05/21/2024    NEUTROABS 6.17 05/21/2024    LYMPHSABS 1.76 05/21/2024    MONOSABS 0.64 05/21/2024    EOSABS 0.02 05/21/2024    BASOSABS 0.08 05/21/2024       Lab Results   Component Value Date    GLUCOSE 89 05/21/2024    BUN 11 05/21/2024    CREATININE 0.91 05/21/2024     05/21/2024    K 5.3 (H) 05/21/2024     05/21/2024    CO2 23.8 05/21/2024    CALCIUM 8.8 05/21/2024    PROTEINTOT 6.2 05/09/2024    ALBUMIN 3.3 (L) 05/21/2024    BILITOT <0.2 05/21/2024    ALKPHOS 87 05/21/2024    AST 17 05/21/2024    ALT 8 05/21/2024             ASSESSMENT & PLAN:  1.  Bilateral breast cancer:  -Right side invasive ductal clinical T1c N0, 1.4 centimeter, ER positive NY positive HER2 negative, grade 2.  Pathologic 1.1 cm T1c N0 on mastectomy.  Recurrence score 44.  Distant recurrence risk on hormone blockade alone 32%. > 15% absolute chemotherapeutic benefit.  -Left side invasive ductal clinical T1b N0, 8 MM, ER negative, NY weakly positive, HER2/nabila negative, essentially triple negative grade 3.  Pathologic T1 a N0 with no residual tumor on mastectomy  Initial bilateral mastectomy recommended.    2.  COPD    Oncology history timeline:  -1/11/2024 bilateral screening mammogram [facility] read [facility] imaging showed asymmetry right breast additional views needed with mass in left breast requiring additional imaging  -2/1/2024 bilateral diagnostic mammogram [place] [facility] showed persistent focal asymmetry right 12:00 7 cm from the nipple 7 mm hypoechoic mass indistinct with irregular margins ultrasound-guided biopsy recommended.  Left breast showed 8 mm 3:00 oval mass 10 cm from the nipple that on the ultrasound was 6 x 5 x 5 mm.  Incidental 4 mm 2:00 hypoechoic mass with internal vascularity 5 cm from the nipple for which ultrasound-guided biopsy recommended  -2/5/2024 right ultrasound-guided core biopsy 7 mm mass with HydroMARK  T4 shaped tissue marker placed at the biopsy site.  Left ultrasound breast core biopsy 3:00 6 mm mass and 2:00 4 mm mass with HydroMARK T3 and T4 respectively.  Right breast 12:00 lesion grade 2 invasive ductal Jimenez-Cui 6 out of 9, 4 mm.  % 3+, AZ 50% 3+.  Also intermediate grade ductal carcinoma in situ  Left breast 3:00 invasive ductal carcinoma grade 3 Bloom-Cui 9 out of 9, 5-6 mm, ER 0%, AZ 25% 1+, HER2/nabila negative 1+  Left breast 2:00 core biopsy fibroadenoma  -2/12/2024 office note Dr. Gregorio Ashton indicates patient with newly diagnosed bilateral breast cancer.  Screening mammogram showed 2 abnormalities in the left breast and 1 in the right.    No personal or family history of breast cancer.  Has hot flashes but not taking estrogen supplements.  -2/13/2024 cervical spine x-ray negative    -2/15/2024 [facility] medical oncology follow-up: Patient has bilateral breast cancers as outlined on routine screening mammogram without any other symptoms.  After her diagnosis, she has had muscle tension aches in the left shoulder which she has a tens unit and recent injection of steroids by her primary care.  This is distinctly in the muscle she says and not in her bones.2/13/2024 cervical spine x-ray has C7 obscured by overlapping bone and soft tissues but otherwise unremarkable. Recent CBC and CMP had normal bone enzymes and was otherwise unremarkable.  Hence I would not make much out of the neck pain in the way of concern for metastatic disease unless this worsens.   She is under a lot of psychological strain from this diagnosis and is quite anxious and I will refer her to Ashlee Nayak are oncology psychiatrist and we will get our breast cancer navigator working with her.  The 7 mm right breast tumor is grade 2  % 3+, AZ 50% 3+, HER2/nabila 0+.  The left breast is clinically 6 mm grade 3 invasive ductal ER negative AZ weakly positive HER2/nabila 1 negative essentially triple negative.  We will get MRI  of her breasts and if the lesion in the left breast turns out to be larger than 1 cm mammographically or is node positive then we would give neoadjuvant therapy.  If not, both for the 7 mm right breast and the 8 mm left breast tumor I would consider primary resection.  She has just started a job with [employer] in [place] and her insurance changes to [employer] in 2 weeks and she wants her surgery done in the [facility] facility for insurance reasons.  I will discuss this with Dr. Ashton (he was scrubbed when I called today) whose skills I recommended to her but we will make appropriate referrals per her request.  With her triple negativity she will also get genetic counseling.  Absent any other somatic complaints and assuming her neck continues to improve I would do no additional staging imaging in the absence of such symptoms.  We will also present her at our multidisciplinary tumor board once the MRI is completed for final decision making.She is committed to bilateral mammograms regardless of the results of the MRI or genetic testing.  Genetic testing is done on all triple negatives.She does have a history of gastric sleeve but still needs to lose weight.  She also carries a diagnosis of COPD and I am not sure who has managed that and Dr. QUAN may need her cleared by primary care/pulmonary before surgery but I will leave it to him.  She is not oxygen requiring.    -2/21/2024 bilateral breast MRI:  Right breast 12:00, 8 cm from nipple, 1.4 cm enhancing mass consistent with biopsy and no additional right breast lesions.  Left breast 2:00 5 cm from nipple is 8 mm masslike enhancement with central signal void artifact corresponding to biopsy site.  No additional suspicious sites in the left breast.  No internal mammary nor axillary salazar involvement on either side    -3/1/2024 [employer] medical oncology follow-up: We reviewed her MRI at multidisciplinary tumor board.  For the right breast cancer she would have surgery  primarily followed by adjuvant hormone blockade but would not do Oncotype as, for the left breast for which we plan surgery upfront for the 8 mm size of tumor triple negative she will need chemo regardless.  If the left breast lesion ends up less than 1 cm node-negative then we would give her Taxotere Cytoxan x 4.    If the left breast lesion is over a centimeter but less than 2 cm, then I would add Adriamycin.  If the left breast lesion is over 2 cm pathologically or node positive, then I would add carboplatin and Keytruda.  For the right breast cancer I would give her at least 5 and, if she is tolerating, 10 years of hormone blockade.  She had gone 1 year without menses but just started spotting.  I contacted Dr. Ramirez who will see her in [facility] office today for pelvic exam and to get hormone levels and to get ultrasound scheduled.  She has COPD without pulmonary function since prior to her surgery for bariatric surgery in 2019.  She smoked until November.  She is not requiring oxygen and uses her inhalers efficiently without major symptoms.  I spoke with Dr. QUAN and he is comfortable with proceeding without pulmonary function tests and he will get preoperative ABG.  I will see her back in about a month which will give her time to hopefully have healed from her surgery and we can then make plans for adjuvant therapy based upon the above algorithm.  She will need a port but Dr. QUAN does not want to place this during the time of surgery but he will make arrangements to do that postoperatively.    -3/21/2024 evacuation left mastectomy hematomaWith bilateral skin sparing mastectomies and bilateral sentinel node injection and biopsies.  Left breast mastectomy pathology shows no residual carcinoma and 1 benign sentinel node.  Pathologic T1 a N0 AR weakly positive essentially triple negative  Right breast mastectomy pathology shows 1.1 cm grade 3 invasive ductal carcinoma, 2 benign sentinel nodes pathologic T1c  N0 ER/AR positive HER2/nabila negative.  Oncotype score 44 distant recurrence risk at 9 years 32%.  Greater than 15% absolute benefit from chemotherapy.    -4/2/2024 [facility] medical oncology follow-up: I reviewed the above bilateral mastectomy pathology reports with her.  Wounds are healing well though still with significant healing yet to do and still has drains in place and is due to see Dr. QUAN tomorrow.  Her left breast had no residual tumor and no lymph node involvement for a pathological T1a N0 weakly AR positive essentially triple negative breast cancer for which I have placed orders for Taxotere Cytoxan x 4.  For her right breast mastectomy which is healing well, she had a 1.1 cm grade 3 invasive ductal carcinoma with 2 benign sentinel nodes pathological T1c N0 ER and AR positive HER2/nabila negative for which I will treat her with Arimidex x 10 years following her chemotherapy.  We will get her to Dr. QUAN for port placement and she will have chemo preparation visit in our [place] office and we will start treatment in 2 weeks assuming Dr. QUAN has cleared her surgically.  No need for radiation given bilateral mastectomies.  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    -4/9/2024 chemotherapy education preparation and needs assessment completed    -4/12/2024 Genetic testing was negative for pathogenic mutations in BRCA1/2 and 46 additional genes on the Common Hereditary Cancers panel.      -5/2/2024 treatment began with TC for a planned 4 courses    -5/2/2024 [facility] oncology clinic follow-up: Ashlee is ready to begin adjuvant therapy with TC today for a planned 4 courses.  Her drains have been removed, she still has sutures in place but her mastectomy scars appear well-healed.  I reviewed labs from yesterday, counts acceptable to continue treatment today.  She is taking her dexamethasone that she started yesterday pretreatment for 3 days.  Once she completes chemotherapy we  will plan on Arimidex for 10 years.  Her genetic testing was negative.     - 6/14/2024 [facility] oncology telemedicine follow-up: Feels great she received cycle 1 TC on 5/2/2024.  13 minutes into her docetaxel, she reported severe bilateral low back and hip pain flushing and diaphoresis with mild abdominal discomfort and anxiousness.  Reaction medications administered.  Docetaxel restarted half rate and then increase to original rate and the remainder of the infusion well-tolerated.  She went to the emergency room 5/10/2024 with nausea vomiting and weakness lasting the previous 3 days concerned about dehydration.  5/21/2024 hemoglobin 8.3 platelets 844,000 with MCV 79.6 otherwise normal CBC and differential.  Potassium 5.3 normal creatinine otherwise unremarkable CMP.  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  As she has a very high recurrence score, I would try the Taxotere again with institution of all the rescue meds upfront and 0.5 mg IV Ativan premed.  We will do this next week in our [place] office and if she tolerates that we will see her back 3 weeks later for cycle 3 of 4.    Total time of care today inclusive of time spent today prior to patient's arrival reviewing interval reaction data and ER visits and during visit interviewing her as to signs or symptoms of her disease and management thereof and after visit instituting this plan took 50 minutes patient care time throughout the day today.  Feels great  Corky Youngblood MD    06/14/2024

## 2024-06-17 ENCOUNTER — TELEPHONE (OUTPATIENT)
Dept: ONCOLOGY | Facility: CLINIC | Age: 49
End: 2024-06-17
Payer: COMMERCIAL

## 2024-06-17 ENCOUNTER — TELEPHONE (OUTPATIENT)
Dept: NUTRITION | Facility: HOSPITAL | Age: 49
End: 2024-06-17
Payer: COMMERCIAL

## 2024-06-17 DIAGNOSIS — Z17.1 MALIGNANT NEOPLASM OF OVERLAPPING SITES OF RIGHT BREAST IN FEMALE, ESTROGEN RECEPTOR NEGATIVE: ICD-10-CM

## 2024-06-17 DIAGNOSIS — C50.812 MALIGNANT NEOPLASM OF OVERLAPPING SITES OF BOTH BREASTS IN FEMALE, ESTROGEN RECEPTOR POSITIVE: Primary | ICD-10-CM

## 2024-06-17 DIAGNOSIS — C50.811 MALIGNANT NEOPLASM OF OVERLAPPING SITES OF RIGHT BREAST IN FEMALE, ESTROGEN RECEPTOR NEGATIVE: ICD-10-CM

## 2024-06-17 DIAGNOSIS — C50.811 MALIGNANT NEOPLASM OF OVERLAPPING SITES OF BOTH BREASTS IN FEMALE, ESTROGEN RECEPTOR POSITIVE: Primary | ICD-10-CM

## 2024-06-17 DIAGNOSIS — Z17.0 MALIGNANT NEOPLASM OF OVERLAPPING SITES OF BOTH BREASTS IN FEMALE, ESTROGEN RECEPTOR POSITIVE: Primary | ICD-10-CM

## 2024-06-17 RX ORDER — SODIUM CHLORIDE 9 MG/ML
20 INJECTION, SOLUTION INTRAVENOUS ONCE
Status: CANCELLED | OUTPATIENT
Start: 2024-06-21

## 2024-06-17 RX ORDER — PALONOSETRON 0.05 MG/ML
0.25 INJECTION, SOLUTION INTRAVENOUS ONCE
Status: CANCELLED | OUTPATIENT
Start: 2024-06-21

## 2024-06-17 RX ORDER — LORAZEPAM 2 MG/ML
0.5 INJECTION INTRAMUSCULAR ONCE
Status: CANCELLED
Start: 2024-06-21 | End: 2024-06-21

## 2024-06-17 RX ORDER — FAMOTIDINE 10 MG/ML
20 INJECTION, SOLUTION INTRAVENOUS ONCE
Status: CANCELLED | OUTPATIENT
Start: 2024-06-21

## 2024-06-17 NOTE — TELEPHONE ENCOUNTER
Caller: Ashlee Marte    Relationship: Self    Best call back number: [phone]    What is the best time to reach you: ANYTIME    Who are you requesting to speak with (clinical staff, provider,  specific staff member): CLINICAL    What was the call regarding: PT ([employer] EMPLOYEE) CALLED REGARDING MEDS SUPPOSED TO BE CALLED IN PRIOR TO HER CHEMO ON 6/21.      PLEASE CALL TO ADVISE.

## 2024-06-17 NOTE — TELEPHONE ENCOUNTER
Called and informed patient she will not have any new prescriptions, all pre meds will be given IV prior to treatment. She verbalized understanding.

## 2024-06-17 NOTE — PROGRESS NOTES
"Outpatient Oncology Nutrition     Reason for Visit: Oncology Nutrition Screening and Patient Education    Patient Name:  Ashlee Marte    :  1975    MRN:  [id number]    Date of Encounter: 2024    Nutrition Assessment     Diagnosis: bilateral breast cancer   -Right side invasive ductal ER positive NH positive HER2 negative, grade 2. Pathologic 1.1 cm T1c N0 on mastectomy. Recurrence score 44.   -Left side invasive ductal ER negative, NH weakly positive, HER2/nabila negative, grade 3. Pathologic T1 a N0 with no residual tumor on mastectomy     Surgery: bilateral mastectomy - 3/21/24    Chemotherapy: OP BREAST TC DOCEtaxel / Cyclophosphamide - every 21 days x 4 cycles (C2 - scheduled for 24)    Hormonal Blockade: Once chemotherapy is completed -  Arimidex for 10 years.     Patient Active Problem List:    Patient Active Problem List   Diagnosis    Seasonal allergic rhinitis    Chronic obstructive pulmonary disease    Malignant neoplasm of overlapping sites of both breasts in female, estrogen receptor positive    Malignant neoplasm of overlapping sites of right female breast       Food / Nutrition Related History       Hydration Status     Goal: ~96 ounces     Enteral Feeding       Anthropometric Measurements     Height:    Ht Readings from Last 1 Encounters:   24 160 cm (63\")       Weight:    Wt Readings from Last 1 Encounters:   24 90.7 kg (200 lb)       BMI: 35.4 - Obese    Weight Change: ~11# (5%) weight loss x 3 months per chart review / patient reports majority of weight loss was s/p bilateral mastectomy and states her weight has been stable / at her usual body weight     Review of Lab Data (Time Frame - 1 month / 2 month)   Labs reviewed - 24    Medication Review   MAR reviewed - Dexamethasone, Prilosec, Vitamin D, and Folic Acid noted     Nutrition Focused Physical Findings       Nutrition Impact Symptoms   No problems with eating at this time    Physical Activity   Normal with " "no limitations    Current Nutritional Intake     Oral diet:  Regular    Intake: oral intake is normal     Malnutrition Risk Assessment     Recent weight loss over the past 6 months:  Yes    How much weight loss:  1 = 2-13 lbs    Eating poorly because of a decreased appetite:  0 = No    Malnutrition Screening Score:     MST = 0 or 1 Patient not at risk for malnutrition    Nutrition Diagnosis     Problem    Etiology    Signs / Symptoms      Nutrition Intervention   Patient's chart reviewed and nutritional screening completed as above.  Briefly spoke with patient via phone call as she was at work.  She reports having decreased appetite and nausea / vomiting after her 1st cycle of chemo.  She states that has all resolved at this time and reports her oral intake is normal.  Discussed weight loss as above.    Will provide written nutrition information \"Nutritional Considerations with Breast Cancer\" and \"HEAL Well: A Cancer Nutrition Guide\" via email per patient request.    Goal   To achieve adequate nutritional and hydration intake.  To aid with nutrition impact symptom management as needed.     Monitoring / Evaluation   She denies nutritional questions at this time.  Advised her to contact RD if questions arise.  She voiced understanding of information discussed.  Will follow up as indicated.     Gena Burris MS, RD, LD   "

## 2024-06-21 ENCOUNTER — HOSPITAL ENCOUNTER (OUTPATIENT)
Dept: ONCOLOGY | Facility: HOSPITAL | Age: 49
Discharge: HOME | End: 2024-06-21
Payer: COMMERCIAL

## 2024-06-21 ENCOUNTER — APPOINTMENT (OUTPATIENT)
Dept: ONCOLOGY | Facility: HOSPITAL | Age: 49
End: 2024-06-21
Payer: COMMERCIAL

## 2024-06-21 VITALS
RESPIRATION RATE: 16 BRPM | OXYGEN SATURATION: 96 % | BODY MASS INDEX: 33.12 KG/M2 | SYSTOLIC BLOOD PRESSURE: 128 MMHG | WEIGHT: 194 LBS | DIASTOLIC BLOOD PRESSURE: 73 MMHG | HEIGHT: 64 IN | TEMPERATURE: 98.3 F | HEART RATE: 88 BPM

## 2024-06-21 DIAGNOSIS — C50.812 MALIGNANT NEOPLASM OF OVERLAPPING SITES OF BOTH BREASTS IN FEMALE, ESTROGEN RECEPTOR POSITIVE: Primary | ICD-10-CM

## 2024-06-21 DIAGNOSIS — C50.811 MALIGNANT NEOPLASM OF OVERLAPPING SITES OF BOTH BREASTS IN FEMALE, ESTROGEN RECEPTOR POSITIVE: Primary | ICD-10-CM

## 2024-06-21 DIAGNOSIS — C50.811 MALIGNANT NEOPLASM OF OVERLAPPING SITES OF RIGHT BREAST IN FEMALE, ESTROGEN RECEPTOR NEGATIVE: ICD-10-CM

## 2024-06-21 DIAGNOSIS — Z17.1 MALIGNANT NEOPLASM OF OVERLAPPING SITES OF RIGHT BREAST IN FEMALE, ESTROGEN RECEPTOR NEGATIVE: ICD-10-CM

## 2024-06-21 DIAGNOSIS — Z17.0 MALIGNANT NEOPLASM OF OVERLAPPING SITES OF BOTH BREASTS IN FEMALE, ESTROGEN RECEPTOR POSITIVE: Primary | ICD-10-CM

## 2024-06-21 DIAGNOSIS — Z51.11 ENCOUNTER FOR ANTINEOPLASTIC CHEMOTHERAPY: ICD-10-CM

## 2024-06-21 LAB
ALBUMIN SERPL-MCNC: 3.9 G/DL (ref 3.5–5.2)
ALBUMIN/GLOB SERPL: 1.4 G/DL
ALP SERPL-CCNC: 103 U/L (ref 39–117)
ALT SERPL W P-5'-P-CCNC: 8 U/L (ref 1–33)
ANION GAP SERPL CALCULATED.3IONS-SCNC: 11 MMOL/L (ref 5–15)
AST SERPL-CCNC: 14 U/L (ref 1–32)
BASOPHILS # BLD AUTO: 0.01 10*3/MM3 (ref 0–0.2)
BASOPHILS NFR BLD AUTO: 0.1 % (ref 0–1.5)
BILIRUB SERPL-MCNC: <0.2 MG/DL (ref 0–1.2)
BUN SERPL-MCNC: 7 MG/DL (ref 6–20)
BUN/CREAT SERPL: 8.5 (ref 7–25)
CALCIUM SPEC-SCNC: 8.4 MG/DL (ref 8.6–10.5)
CHLORIDE SERPL-SCNC: 105 MMOL/L (ref 98–107)
CO2 SERPL-SCNC: 26 MMOL/L (ref 22–29)
CREAT SERPL-MCNC: 0.82 MG/DL (ref 0.57–1)
DEPRECATED RDW RBC AUTO: 50.5 FL (ref 37–54)
EGFRCR SERPLBLD CKD-EPI 2021: 87.3 ML/MIN/1.73
EOSINOPHIL # BLD AUTO: 0.02 10*3/MM3 (ref 0–0.4)
EOSINOPHIL NFR BLD AUTO: 0.2 % (ref 0.3–6.2)
ERYTHROCYTE [DISTWIDTH] IN BLOOD BY AUTOMATED COUNT: 17.8 % (ref 12.3–15.4)
GLOBULIN UR ELPH-MCNC: 2.7 GM/DL
GLUCOSE SERPL-MCNC: 134 MG/DL (ref 65–99)
HCT VFR BLD AUTO: 31 % (ref 34–46.6)
HGB BLD-MCNC: 9.3 G/DL (ref 12–15.9)
IMM GRANULOCYTES # BLD AUTO: 0.02 10*3/MM3 (ref 0–0.05)
IMM GRANULOCYTES NFR BLD AUTO: 0.2 % (ref 0–0.5)
LYMPHOCYTES # BLD AUTO: 1.89 10*3/MM3 (ref 0.7–3.1)
LYMPHOCYTES NFR BLD AUTO: 22.3 % (ref 19.6–45.3)
MCH RBC QN AUTO: 23 PG (ref 26.6–33)
MCHC RBC AUTO-ENTMCNC: 30 G/DL (ref 31.5–35.7)
MCV RBC AUTO: 76.7 FL (ref 79–97)
MONOCYTES # BLD AUTO: 0.33 10*3/MM3 (ref 0.1–0.9)
MONOCYTES NFR BLD AUTO: 3.9 % (ref 5–12)
NEUTROPHILS NFR BLD AUTO: 6.2 10*3/MM3 (ref 1.7–7)
NEUTROPHILS NFR BLD AUTO: 73.3 % (ref 42.7–76)
PLATELET # BLD AUTO: 473 10*3/MM3 (ref 140–450)
PMV BLD AUTO: 8.7 FL (ref 6–12)
POTASSIUM SERPL-SCNC: 3.9 MMOL/L (ref 3.5–5.2)
PROT SERPL-MCNC: 6.6 G/DL (ref 6–8.5)
RBC # BLD AUTO: 4.04 10*6/MM3 (ref 3.77–5.28)
SODIUM SERPL-SCNC: 142 MMOL/L (ref 136–145)
WBC NRBC COR # BLD AUTO: 8.47 10*3/MM3 (ref 3.4–10.8)

## 2024-06-21 PROCEDURE — 96376 TX/PRO/DX INJ SAME DRUG ADON: CPT

## 2024-06-21 PROCEDURE — 25010000002 ALTEPLASE 2 MG RECONSTITUTED SOLUTION: Performed by: INTERNAL MEDICINE

## 2024-06-21 PROCEDURE — 96375 TX/PRO/DX INJ NEW DRUG ADDON: CPT

## 2024-06-21 PROCEDURE — 36593 DECLOT VASCULAR DEVICE: CPT

## 2024-06-21 PROCEDURE — 25010000002 DIPHENHYDRAMINE PER 50 MG: Performed by: INTERNAL MEDICINE

## 2024-06-21 PROCEDURE — 25010000002 HEPARIN LOCK FLUSH PER 10 UNITS: Performed by: INTERNAL MEDICINE

## 2024-06-21 PROCEDURE — 96409 CHEMO IV PUSH SNGL DRUG: CPT

## 2024-06-21 PROCEDURE — 25010000002 LORAZEPAM PER 2 MG: Performed by: INTERNAL MEDICINE

## 2024-06-21 PROCEDURE — 25810000003 SODIUM CHLORIDE 0.9 % SOLUTION 250 ML FLEX CONT: Performed by: INTERNAL MEDICINE

## 2024-06-21 PROCEDURE — 25010000002 PALONOSETRON PER 25 MCG: Performed by: INTERNAL MEDICINE

## 2024-06-21 PROCEDURE — 25010000002 METHYLPREDNISOLONE PER 40 MG: Performed by: INTERNAL MEDICINE

## 2024-06-21 PROCEDURE — 25810000003 SODIUM CHLORIDE 0.9 % SOLUTION: Performed by: INTERNAL MEDICINE

## 2024-06-21 PROCEDURE — 25010000002 HYDROCORTISONE SOD SUC (PF) 100 MG RECONSTITUTED SOLUTION: Performed by: INTERNAL MEDICINE

## 2024-06-21 PROCEDURE — 36415 COLL VENOUS BLD VENIPUNCTURE: CPT

## 2024-06-21 PROCEDURE — 25010000002 DOCETAXEL 20 MG/ML SOLUTION 8 ML VIAL: Performed by: INTERNAL MEDICINE

## 2024-06-21 PROCEDURE — 80053 COMPREHEN METABOLIC PANEL: CPT | Performed by: INTERNAL MEDICINE

## 2024-06-21 PROCEDURE — 85025 COMPLETE CBC W/AUTO DIFF WBC: CPT | Performed by: INTERNAL MEDICINE

## 2024-06-21 RX ORDER — MEPERIDINE HYDROCHLORIDE 25 MG/ML
50 INJECTION INTRAMUSCULAR; INTRAVENOUS; SUBCUTANEOUS ONCE
Status: DISCONTINUED | OUTPATIENT
Start: 2024-06-21 | End: 2024-06-22 | Stop reason: HOSPADM

## 2024-06-21 RX ORDER — SODIUM CHLORIDE 9 MG/ML
20 INJECTION, SOLUTION INTRAVENOUS ONCE
Status: COMPLETED | OUTPATIENT
Start: 2024-06-21 | End: 2024-06-21

## 2024-06-21 RX ORDER — OLANZAPINE 5 MG/1
5 TABLET ORAL NIGHTLY
Qty: 4 TABLET | Refills: 5 | Status: SHIPPED | OUTPATIENT
Start: 2024-06-21

## 2024-06-21 RX ORDER — ONDANSETRON HYDROCHLORIDE 8 MG/1
8 TABLET, FILM COATED ORAL 3 TIMES DAILY PRN
Qty: 30 TABLET | Refills: 5 | Status: SHIPPED | OUTPATIENT
Start: 2024-06-21

## 2024-06-21 RX ORDER — DOXORUBICIN HYDROCHLORIDE 2 MG/ML
50 INJECTION, SOLUTION INTRAVENOUS ONCE
OUTPATIENT
Start: 2024-06-28

## 2024-06-21 RX ORDER — SODIUM CHLORIDE 9 MG/ML
20 INJECTION, SOLUTION INTRAVENOUS ONCE
OUTPATIENT
Start: 2024-06-28

## 2024-06-21 RX ORDER — HEPARIN SODIUM (PORCINE) LOCK FLUSH IV SOLN 100 UNIT/ML 100 UNIT/ML
500 SOLUTION INTRAVENOUS AS NEEDED
Status: DISCONTINUED | OUTPATIENT
Start: 2024-06-21 | End: 2024-06-22 | Stop reason: HOSPADM

## 2024-06-21 RX ORDER — SODIUM CHLORIDE 0.9 % (FLUSH) 0.9 %
20 SYRINGE (ML) INJECTION AS NEEDED
Status: DISCONTINUED | OUTPATIENT
Start: 2024-06-21 | End: 2024-06-22 | Stop reason: HOSPADM

## 2024-06-21 RX ORDER — VENLAFAXINE HYDROCHLORIDE 75 MG/1
75 CAPSULE, EXTENDED RELEASE ORAL DAILY
Qty: 30 CAPSULE | Refills: 11 | Status: SHIPPED | OUTPATIENT
Start: 2024-06-21

## 2024-06-21 RX ORDER — FAMOTIDINE 10 MG/ML
20 INJECTION, SOLUTION INTRAVENOUS ONCE
Status: COMPLETED | OUTPATIENT
Start: 2024-06-21 | End: 2024-06-21

## 2024-06-21 RX ORDER — SODIUM CHLORIDE 0.9 % (FLUSH) 0.9 %
20 SYRINGE (ML) INJECTION AS NEEDED
OUTPATIENT
Start: 2024-06-21

## 2024-06-21 RX ORDER — PALONOSETRON 0.05 MG/ML
0.25 INJECTION, SOLUTION INTRAVENOUS ONCE
Status: COMPLETED | OUTPATIENT
Start: 2024-06-21 | End: 2024-06-21

## 2024-06-21 RX ORDER — HEPARIN SODIUM (PORCINE) LOCK FLUSH IV SOLN 100 UNIT/ML 100 UNIT/ML
500 SOLUTION INTRAVENOUS AS NEEDED
OUTPATIENT
Start: 2024-06-21

## 2024-06-21 RX ORDER — METHYLPREDNISOLONE SODIUM SUCCINATE 40 MG/ML
25 INJECTION, POWDER, LYOPHILIZED, FOR SOLUTION INTRAMUSCULAR; INTRAVENOUS ONCE
Status: COMPLETED | OUTPATIENT
Start: 2024-06-21 | End: 2024-06-21

## 2024-06-21 RX ORDER — LORAZEPAM 2 MG/ML
0.5 INJECTION INTRAMUSCULAR ONCE
Status: COMPLETED | OUTPATIENT
Start: 2024-06-21 | End: 2024-06-21

## 2024-06-21 RX ORDER — FLUOROURACIL 50 MG/ML
500 INJECTION, SOLUTION INTRAVENOUS ONCE
OUTPATIENT
Start: 2024-06-28

## 2024-06-21 RX ORDER — PALONOSETRON 0.05 MG/ML
0.25 INJECTION, SOLUTION INTRAVENOUS ONCE
OUTPATIENT
Start: 2024-06-28

## 2024-06-21 RX ADMIN — ALTEPLASE: 2.2 INJECTION, POWDER, LYOPHILIZED, FOR SOLUTION INTRAVENOUS at 09:53

## 2024-06-21 RX ADMIN — PALONOSETRON HYDROCHLORIDE 0.25 MG: 0.25 INJECTION INTRAVENOUS at 11:24

## 2024-06-21 RX ADMIN — SODIUM CHLORIDE 150 MG: 9 INJECTION, SOLUTION INTRAVENOUS at 12:29

## 2024-06-21 RX ADMIN — DIPHENHYDRAMINE HYDROCHLORIDE 50 MG: 50 INJECTION INTRAMUSCULAR; INTRAVENOUS at 11:29

## 2024-06-21 RX ADMIN — SODIUM CHLORIDE 20 ML/HR: 9 INJECTION, SOLUTION INTRAVENOUS at 11:18

## 2024-06-21 RX ADMIN — HYDROCORTISONE SODIUM SUCCINATE 100 MG: 100 INJECTION, POWDER, FOR SOLUTION INTRAMUSCULAR; INTRAVENOUS at 11:22

## 2024-06-21 RX ADMIN — HEPARIN 500 UNITS: 100 SYRINGE at 13:08

## 2024-06-21 RX ADMIN — FAMOTIDINE 20 MG: 10 INJECTION, SOLUTION INTRAVENOUS at 11:27

## 2024-06-21 RX ADMIN — LORAZEPAM 0.5 MG: 2 INJECTION INTRAMUSCULAR; INTRAVENOUS at 11:19

## 2024-06-21 RX ADMIN — METHYLPREDNISOLONE SODIUM SUCCINATE 25 MG: 40 INJECTION, POWDER, FOR SOLUTION INTRAMUSCULAR; INTRAVENOUS at 12:44

## 2024-06-21 NOTE — PROGRESS NOTES
-6/21/2024 note: Called to infusion.  Patient had had all the rescue occasions given upfront and despite that had terrible back pain.  Not as excruciating as the first cycle but still too much to handle.  No wheezing or rashes but nonetheless concerning for potential infusion allergic reaction.  In the literature looking at docetaxel after paclitaxel infusion reactions, there was a 50% chance of reaction suggesting that it is the taxane moiety and not just the vehicle causing the reaction in all cases.  Hence, I am hesitant to use Taxol in place of Taxotere.  To that end, I will get her ejection fraction and assuming it is normal we will try 6 cycles of CAF.  Prozac has a category X potential increase of Adriamycin levels.  She is prescribed this by her primary care.  I will discontinue and substitute Effexor XR 75 mg daily which does not have this interaction.  Total time attending to patient and changing these plans and putting in new prescriptions took 1 hour patient care time today.  
No

## 2024-06-24 ENCOUNTER — TELEPHONE (OUTPATIENT)
Dept: ONCOLOGY | Facility: CLINIC | Age: 49
End: 2024-06-24

## 2024-06-24 NOTE — TELEPHONE ENCOUNTER
Caller: Ashlee Marte    Relationship to patient: Self    Best call back number: [phone]    Chief complaint: R/S    Type of visit: LAB AND PT ED    Requested date: SAME DAY 6-24 REQUESTING TO MOVE THE LAB TO [place] AND PT REQUESTING TO CANCEL PT EDUCATION SINCE SHE ALREADY COMPLETED THAT IN [facility]     If rescheduling, when is the original appointment: 6-24    Additional notes: PT CONTACT TO ADVISE IF LAB CAN BE DONE IN [place]

## 2024-06-24 NOTE — TELEPHONE ENCOUNTER
EDELMIRA IS CALLING STATES SHE WILL GET HER LABS AT HER WORK   SHE WORKS FOR [employer]

## 2024-06-26 ENCOUNTER — HOSPITAL ENCOUNTER (OUTPATIENT)
Dept: CARDIOLOGY | Facility: HOSPITAL | Age: 49
Discharge: HOME | End: 2024-06-26
Payer: COMMERCIAL

## 2024-06-26 ENCOUNTER — SPECIALTY PHARMACY (OUTPATIENT)
Dept: ONCOLOGY | Facility: HOSPITAL | Age: 49
End: 2024-06-26
Payer: COMMERCIAL

## 2024-06-26 ENCOUNTER — HOSPITAL ENCOUNTER (OUTPATIENT)
Dept: ONCOLOGY | Facility: HOSPITAL | Age: 49
Discharge: HOME | End: 2024-06-26
Payer: COMMERCIAL

## 2024-06-26 ENCOUNTER — LAB (OUTPATIENT)
Dept: FAMILY MEDICINE CLINIC | Facility: CLINIC | Age: 49
End: 2024-06-26
Payer: COMMERCIAL

## 2024-06-26 VITALS
DIASTOLIC BLOOD PRESSURE: 99 MMHG | HEIGHT: 64 IN | WEIGHT: 194 LBS | SYSTOLIC BLOOD PRESSURE: 182 MMHG | BODY MASS INDEX: 33.12 KG/M2

## 2024-06-26 DIAGNOSIS — Z17.1 MALIGNANT NEOPLASM OF OVERLAPPING SITES OF RIGHT BREAST IN FEMALE, ESTROGEN RECEPTOR NEGATIVE: ICD-10-CM

## 2024-06-26 DIAGNOSIS — Z51.11 ENCOUNTER FOR ANTINEOPLASTIC CHEMOTHERAPY: ICD-10-CM

## 2024-06-26 DIAGNOSIS — Z17.0 MALIGNANT NEOPLASM OF OVERLAPPING SITES OF BOTH BREASTS IN FEMALE, ESTROGEN RECEPTOR POSITIVE: ICD-10-CM

## 2024-06-26 DIAGNOSIS — C50.811 MALIGNANT NEOPLASM OF OVERLAPPING SITES OF RIGHT BREAST IN FEMALE, ESTROGEN RECEPTOR NEGATIVE: ICD-10-CM

## 2024-06-26 DIAGNOSIS — C50.812 MALIGNANT NEOPLASM OF OVERLAPPING SITES OF BOTH BREASTS IN FEMALE, ESTROGEN RECEPTOR POSITIVE: ICD-10-CM

## 2024-06-26 DIAGNOSIS — C50.811 MALIGNANT NEOPLASM OF OVERLAPPING SITES OF BOTH BREASTS IN FEMALE, ESTROGEN RECEPTOR POSITIVE: ICD-10-CM

## 2024-06-26 LAB
BH CV ECHO LEFT VENTRICLE GLOBAL LONGITUDINAL STRAIN: -22.1 %
BH CV ECHO MEAS - EF(MOD-BP): 52.6 %
BH CV ECHO MEAS - IVS/LVPW: 1 CM
BH CV ECHO MEAS - IVSD: 0.8 CM
BH CV ECHO MEAS - LA DIMENSION: 4 CM
BH CV ECHO MEAS - LVIDD: 5.8 CM
BH CV ECHO MEAS - LVIDS: 4.1 CM
BH CV ECHO MEAS - LVPWD: 0.8 CM
LEFT ATRIUM VOLUME INDEX: 28.8 ML/M2

## 2024-06-26 PROCEDURE — 93308 TTE F-UP OR LMTD: CPT | Performed by: INTERNAL MEDICINE

## 2024-06-26 PROCEDURE — 93356 MYOCRD STRAIN IMG SPCKL TRCK: CPT

## 2024-06-26 PROCEDURE — 93308 TTE F-UP OR LMTD: CPT

## 2024-06-26 PROCEDURE — 93356 MYOCRD STRAIN IMG SPCKL TRCK: CPT | Performed by: INTERNAL MEDICINE

## 2024-06-26 NOTE — PROGRESS NOTES
Outpatient Infusion  [address] [place] [address]  [phone]      CHEMOTHERAPY EDUCATION    NAME:  Ashlee Marte      : 1975           DATE: 24    Medication Education Sheets: (select all that apply)  Cyclophosphamide, Doxorubicin, Fluorouracil, and Pegfilgrastim    Other Education Sheets: (select all that apply)  CINV, Diarrhea, Hand-Foot Syndrome, and Symptom Tracker Sheet and [facility] Information    Chemotherapy Regimen:   OP BREAST FAC DOXOrubicin / Cyclophosphamide / Fluorouracil IV every 21 days for 6 cycles  -doxorubicin 50 mg/m2 IV push on Day 1  -cyclophosphamide 500 mg/m2 IV on Day 1  -fluorouracil 500 mg/m2 IV push on Day 1  -pegfilgrastim (Neulasta OnPro) 6 mg subQ on Day 1    TOPICS EDUCATION PROVIDED COMMENTS   ANEMIA:  role of RBC, cause, s/s, ways to manage, role of transfusion [x] Reviewed the role of RBC and the use of transfusions if hemoglobin decreases too much.  Patient to notify us if she experiences shortness of breath, dizziness, or palpitations.  Also let patient know she could feel more tired than usual and to try to stay active, but rest if she needs to.    THROMBOCYTOPENIA:  role of platelet, cause, s/s, ways to prevent bleeding, things to avoid, when to seek help [x] Reviewed the role of platelets in blood clotting and when to call clinic (bloody nose that bleeds for 5 minutes despite pressure, a cut that won't stop bleeding despite pressure, gums that bleed excessively with brushing or flossing). Recommended using an electric razor, soft bristle toothbrush, and blowing the nose gently.    NEUTROPENIA:  role of WBC, cause, infection precautions, s/s of infection, when to call MD [x] Reviewed the role of WBC, good infection prevention practices, and when to call the clinic (temperature 100.4F, sore throat, burning urination, etc)   DIARRHEA:  causes, s/s of dehydration, ways to manage, dietary changes, when to call MD [x] Chemotherapy :  Discussed the risk of diarrhea. Instructed patient on use OTC loperamide with diarrhea onset, but emphasized the importance of calling the clinic if 4-6 episodes in 24 hours not relieved by OTC loperamide.   NAUSEA & VOMITING:  cause, use of antiemetics, dietary changes, when to call MD [x] Emetic risk: High  Premeds: Dexamethasone, Fosaprepitant, and Palonosetron  Scheduled home meds: Olanzapine 5 mg by mouth at night on days 1-4 after chemotherapy to prevent delayed nausea  PRN home meds: Ondansetron 8 mg PO q 8 hours PRN for N/V  Pharmacy home meds sent to: [facility]    Instructed the patient to take the scheduled anti-nausea medications even if she does not feel nauseous.  I explained this is to prevent delayed nausea.    Instructed the patient to take a dose of the PRN medication at the first onset of nausea and if it's not working to call us for additional medications.  Also provided non-drug measures to mitigate nausea.   MOUTH SORES:  causes, oral care, ways to manage [x] Mouth sores can be prevented by making a mouth wash mixture of salt, baking soda, and water. The patient was instructed to swish and spit four times daily after meals and before bedtime. Also recommended using a soft bristle toothbrush and avoiding alcohol-containing OTC mouthwashes.    ALOPECIA:  cause, ways to manage, resources [x] Discussed the possibility of hair loss with the patient. Informed patient that she could request a prescription for a wig if desired and most of the cost is usually covered by insurance. Recommended covering the head with a hat and/or protecting the skin on the head with SPF 30 or higher.    PAIN:  causes, ways to manage [x] Chemo: Discussed muscle and joint aches/pains with chemotherapy, and recommended the use of OTC pain relief with ibuprofen or acetaminophen if needed., Growth Factors: Discussed the possibility for bone pain with filgrastim / pegfilgrastim, and reviewed the use of loratadine 10 mg by mouth at  night on days 2-8 of each cycle to help manage this side effect., and EMLA Cream: Discussed rx for EMLA cream, and the benefit of use 45-60 minutes prior to access of port for lab draws / infusions. Rx sent to [facility]   SKIN & NAIL CHANGES:  cause, s/s, ways to manage [x] Chemotherapy: Informed the patient of the possibility for dark or white lines on finger and toenails during treatment, and that nails may become brittle and even fall off in extreme cases. This will likely reverse after treatment concludes. , HFS: Hand-Foot Syndrome was discussed, including signs/symptoms, prevention measures, and treatment measures. A supplementary handout with this information was also given to the patient.   ORGAN TOXICITIES:  cause, s/s, need for diagnostic tests, labs, when to notify MD [x] Discussed potential effects on organ systems, monitoring, diagnostic tests, labs, and when to notify the clinic. Discussed the signs/symptoms of the following: cardiotoxicity, central neurotoxicity (confusion, vision changes, stiff neck, seizure), and lung changes.   INFUSION RELATED REACTIONS or INJECTION-SITE REACTION:  Cause, s/s, anaphylaxis, monitoring, etc. [x] Premeds: None    Discussed the risk of an infusion reaction and symptoms such as: fever, chills, dizziness, itchiness or rash, flushing, trouble breathing, wheezing, sudden back pain, or feeling faint.  Instructed the patient to notify her nurse if she starts feeling weird at any point during her infusion. Reviewed how infusion reactions are managed.   SURVIVORSHIP:  distress, distress assessment, secondary malignancies, early/late effects, follow-up, social issues, social support [x] Discussed the rare, but possible risk of secondary malignancies months to years after treatment, most commonly acute myeloid leukemia.   MISCELLANEOUS:  drug interactions, administration, labs, etc. [x] Discussed chemotherapy schedule, lab draws, infusion times, and total expected visit time.    DDIs: No significant DDIs  Lab draws: On or before day 1 of each cycle, no sooner than 3 days early.  Reviewed the possibility for hemorrhagic cystitis and emphasized the importance of adequate hydration and frequent voiding of the bladder.  Doxorubicin Red Body Fluids: Discussed the possibility of red body fluids for about 2-3 days after doxorubicin.  Recommended the patient not wear contacts during this time because they could be stained pink.  Patient instructed to contact clinic if urine appears red / pink more than 3 days after receiving doxorubicin.   INFERTILITY & SEXUALITY:    causes, fertility preservation options, sexuality changes, ways to manage, importance of birth control [x] IV Oncology Therapy: Reviewed safe sex practices and the importance of minimizing exposure to body fluids for 48 hours after each dose of IV oncology therapy., The patient is not of childbearing potential.   HOME CARE:  storing of oral chemo, how to manage bodily fluids [x] IV - Counseled on management of soiled linens and proper flush technique.  Discussed how to manage all the side effects at home and advised when to contact the MD office     Medications:  Prior to Admission medications    Medication Sig Start Date End Date Taking? Authorizing Provider   albuterol sulfate HFA (Ventolin HFA) 108 (90 Base) MCG/ACT inhaler Inhale 2 puffs Every 4 (Four) Hours As Needed for wheezing 2/14/24   Wen Todd APRN   albuterol sulfate  (90 Base) MCG/ACT inhaler Inhale 2 puffs Every 4 (Four) Hours As Needed for Wheezing. 12/14/23   Wen Todd APRN   budesonide-formoterol (Symbicort) 80-4.5 MCG/ACT inhaler Inhale 2 puffs 2 (Two) Times a Day. 6/6/23   Wen Todd APRN   budesonide-formoterol (Symbicort) 80-4.5 MCG/ACT inhaler Inhale 2 puffs 2 (Two) Times a Day. 6/6/23   Wen Todd APRN   cloNIDine (CATAPRES) 0.1 MG tablet TAKE 1 TABLET BY MOUTH 2 TIMES A DAY 4/9/24   Tomy Landry MD   folic  acid (FOLVITE) 1 MG tablet Take 1 tablet by mouth Daily. 12/22/23   Wen Todd APRN   lidocaine-prilocaine (EMLA) 2.5-2.5 % cream Apply 1 Application topically to the appropriate area as directed As Needed (45-60 minutes prior to port access.  Cover with saran/plastic wrap.). 4/8/24   Danni Campo APRN   LORazepam (Ativan) 1 MG tablet Take 1 tablet by mouth Every 8 (Eight) Hours As Needed for Anxiety. 2/12/24   Wen Todd APRN   metoprolol succinate XL (Toprol XL) 25 MG 24 hr tablet Take 1 tablet by mouth Daily. 3/14/24   Tomy Landry MD   metroNIDAZOLE (Flagyl) 500 MG tablet Take 1 tablet by mouth 2 (Two) Times a Day. 3/5/24      OLANZapine (zyPREXA) 5 MG tablet Take 1 tablet by mouth Every Night for 4 doses starting night of chemotherapy. 6/21/24   Corky Youngblood MD   omeprazole (priLOSEC) 40 MG capsule Take 1 capsule by mouth 2 (Two) Times a Day. 7/13/23   Wen Todd APRN   omeprazole (priLOSEC) 40 MG capsule Take 1 capsule by mouth 2 (Two) Times a Day. 12/1/23   Wen Todd APRN   ondansetron (ZOFRAN) 8 MG tablet Take 1 tablet by mouth 3 (Three) Times a Day As Needed for Nausea or Vomiting. 6/21/24   Corky Youngblood MD   sulfamethoxazole-trimethoprim (BACTRIM DS,SEPTRA DS) 800-160 MG per tablet Take 1 tablet by mouth 2 (Two) Times a Day. 4/18/24      venlafaxine XR (EFFEXOR-XR) 75 MG 24 hr capsule Take 1 capsule by mouth Daily. 6/21/24   Corky Youngblood MD   vitamin D (ERGOCALCIFEROL) 1.25 MG (04762 UT) capsule capsule Take 1 capsule by mouth 1 (One) Time Per Week. 12/22/23   Wen Todd APRN   vitamin D (ERGOCALCIFEROL) 1.25 MG (16214 UT) capsule capsule Take 1 capsule by mouth 1 (One) Time Per Week. 12/24/23   Wen Todd, SHVAON     Notes: All questions and concerns were addressed. Provided a personalized treatment calendar to patient (includes treatment and lab schedule). Provided patient with contact information for the pharmacist and clinic while  instructing them to call if any questions or concerns arise. Informed consent for treatment was obtained. Patient was receptive to information and expressed understanding.     Discussed with the patient she can stop taking dexamethasone the day before, day of, and day after chemo since she is no longer going to receive docetaxel. Patient verbalized understanding. I removed dexamethasone from the patient's medication list today.    Debo Moran, PharmD  [facility] Pharmacy Specialist  [phone]    6/26/2024  13:53 EDT

## 2024-06-27 LAB
ALBUMIN SERPL-MCNC: 3.9 G/DL (ref 3.9–4.9)
ALP SERPL-CCNC: 93 IU/L (ref 44–121)
ALT SERPL-CCNC: 10 IU/L (ref 0–32)
AST SERPL-CCNC: 14 IU/L (ref 0–40)
BASOPHILS # BLD AUTO: 0.1 X10E3/UL (ref 0–0.2)
BASOPHILS NFR BLD AUTO: 1 %
BILIRUB SERPL-MCNC: 0.4 MG/DL (ref 0–1.2)
BUN SERPL-MCNC: 14 MG/DL (ref 6–24)
BUN/CREAT SERPL: 17 (ref 9–23)
CALCIUM SERPL-MCNC: 9.3 MG/DL (ref 8.7–10.2)
CHLORIDE SERPL-SCNC: 103 MMOL/L (ref 96–106)
CO2 SERPL-SCNC: 24 MMOL/L (ref 20–29)
CREAT SERPL-MCNC: 0.82 MG/DL (ref 0.57–1)
EGFRCR SERPLBLD CKD-EPI 2021: 87 ML/MIN/1.73
EOSINOPHIL # BLD AUTO: 0.3 X10E3/UL (ref 0–0.4)
EOSINOPHIL NFR BLD AUTO: 3 %
ERYTHROCYTE [DISTWIDTH] IN BLOOD BY AUTOMATED COUNT: 17.4 % (ref 11.7–15.4)
GLOBULIN SER CALC-MCNC: 2.5 G/DL (ref 1.5–4.5)
GLUCOSE SERPL-MCNC: 84 MG/DL (ref 70–99)
HCT VFR BLD AUTO: 34.1 % (ref 34–46.6)
HGB BLD-MCNC: 10.1 G/DL (ref 11.1–15.9)
IMM GRANULOCYTES # BLD AUTO: 0 X10E3/UL (ref 0–0.1)
IMM GRANULOCYTES NFR BLD AUTO: 0 %
LYMPHOCYTES # BLD AUTO: 2.3 X10E3/UL (ref 0.7–3.1)
LYMPHOCYTES NFR BLD AUTO: 29 %
MCH RBC QN AUTO: 22.6 PG (ref 26.6–33)
MCHC RBC AUTO-ENTMCNC: 29.6 G/DL (ref 31.5–35.7)
MCV RBC AUTO: 77 FL (ref 79–97)
MONOCYTES # BLD AUTO: 0.4 X10E3/UL (ref 0.1–0.9)
MONOCYTES NFR BLD AUTO: 6 %
NEUTROPHILS # BLD AUTO: 4.9 X10E3/UL (ref 1.4–7)
NEUTROPHILS NFR BLD AUTO: 61 %
PLATELET # BLD AUTO: 535 X10E3/UL (ref 150–450)
POTASSIUM SERPL-SCNC: 4.7 MMOL/L (ref 3.5–5.2)
PROT SERPL-MCNC: 6.4 G/DL (ref 6–8.5)
RBC # BLD AUTO: 4.46 X10E6/UL (ref 3.77–5.28)
SODIUM SERPL-SCNC: 139 MMOL/L (ref 134–144)
WBC # BLD AUTO: 7.9 X10E3/UL (ref 3.4–10.8)

## 2024-06-28 ENCOUNTER — HOSPITAL ENCOUNTER (OUTPATIENT)
Dept: ONCOLOGY | Facility: HOSPITAL | Age: 49
Discharge: HOME | End: 2024-06-28
Admitting: INTERNAL MEDICINE
Payer: COMMERCIAL

## 2024-06-28 VITALS
SYSTOLIC BLOOD PRESSURE: 140 MMHG | DIASTOLIC BLOOD PRESSURE: 73 MMHG | TEMPERATURE: 97.4 F | RESPIRATION RATE: 16 BRPM | OXYGEN SATURATION: 96 % | HEART RATE: 86 BPM | BODY MASS INDEX: 33.12 KG/M2 | WEIGHT: 194 LBS | HEIGHT: 64 IN

## 2024-06-28 DIAGNOSIS — C50.811 MALIGNANT NEOPLASM OF OVERLAPPING SITES OF BOTH BREASTS IN FEMALE, ESTROGEN RECEPTOR POSITIVE: Primary | ICD-10-CM

## 2024-06-28 DIAGNOSIS — C50.811 MALIGNANT NEOPLASM OF OVERLAPPING SITES OF RIGHT BREAST IN FEMALE, ESTROGEN RECEPTOR NEGATIVE: ICD-10-CM

## 2024-06-28 DIAGNOSIS — Z17.1 MALIGNANT NEOPLASM OF OVERLAPPING SITES OF RIGHT BREAST IN FEMALE, ESTROGEN RECEPTOR NEGATIVE: ICD-10-CM

## 2024-06-28 DIAGNOSIS — C50.812 MALIGNANT NEOPLASM OF OVERLAPPING SITES OF BOTH BREASTS IN FEMALE, ESTROGEN RECEPTOR POSITIVE: Primary | ICD-10-CM

## 2024-06-28 DIAGNOSIS — Z17.0 MALIGNANT NEOPLASM OF OVERLAPPING SITES OF BOTH BREASTS IN FEMALE, ESTROGEN RECEPTOR POSITIVE: Primary | ICD-10-CM

## 2024-06-28 PROCEDURE — 25010000002 FOSAPREPITANT PER 1 MG: Performed by: INTERNAL MEDICINE

## 2024-06-28 PROCEDURE — 25010000002 DEXAMETHASONE SODIUM PHOSPHATE 100 MG/10ML SOLUTION: Performed by: INTERNAL MEDICINE

## 2024-06-28 PROCEDURE — 96367 TX/PROPH/DG ADDL SEQ IV INF: CPT

## 2024-06-28 PROCEDURE — 96377 APPLICATON ON-BODY INJECTOR: CPT

## 2024-06-28 PROCEDURE — 25010000002 DOXORUBICIN PER 10 MG: Performed by: INTERNAL MEDICINE

## 2024-06-28 PROCEDURE — 96413 CHEMO IV INFUSION 1 HR: CPT

## 2024-06-28 PROCEDURE — 25810000003 SODIUM CHLORIDE 0.9 % SOLUTION: Performed by: INTERNAL MEDICINE

## 2024-06-28 PROCEDURE — 96375 TX/PRO/DX INJ NEW DRUG ADDON: CPT

## 2024-06-28 PROCEDURE — 25010000002 FLUOROURACIL PER 500 MG: Performed by: INTERNAL MEDICINE

## 2024-06-28 PROCEDURE — 25010000002 CYCLOPHOSPHAMIDE 2 GM/10ML SOLUTION 10 ML VIAL: Performed by: INTERNAL MEDICINE

## 2024-06-28 PROCEDURE — 96411 CHEMO IV PUSH ADDL DRUG: CPT

## 2024-06-28 PROCEDURE — 25010000002 PEGFILGRASTIM 6 MG/0.6ML PREFILLED SYRINGE KIT: Performed by: INTERNAL MEDICINE

## 2024-06-28 PROCEDURE — 25010000002 PALONOSETRON PER 25 MCG: Performed by: INTERNAL MEDICINE

## 2024-06-28 PROCEDURE — 25810000003 SODIUM CHLORIDE 0.9 % SOLUTION 250 ML FLEX CONT: Performed by: INTERNAL MEDICINE

## 2024-06-28 PROCEDURE — 25010000002 HEPARIN LOCK FLUSH PER 10 UNITS: Performed by: INTERNAL MEDICINE

## 2024-06-28 RX ORDER — SODIUM CHLORIDE 0.9 % (FLUSH) 0.9 %
20 SYRINGE (ML) INJECTION AS NEEDED
OUTPATIENT
Start: 2024-06-28

## 2024-06-28 RX ORDER — HEPARIN SODIUM (PORCINE) LOCK FLUSH IV SOLN 100 UNIT/ML 100 UNIT/ML
500 SOLUTION INTRAVENOUS AS NEEDED
OUTPATIENT
Start: 2024-06-28

## 2024-06-28 RX ORDER — SODIUM CHLORIDE 0.9 % (FLUSH) 0.9 %
20 SYRINGE (ML) INJECTION AS NEEDED
Status: DISCONTINUED | OUTPATIENT
Start: 2024-06-28 | End: 2024-06-29 | Stop reason: HOSPADM

## 2024-06-28 RX ORDER — FLUOROURACIL 50 MG/ML
1000 INJECTION, SOLUTION INTRAVENOUS ONCE
Status: COMPLETED | OUTPATIENT
Start: 2024-06-28 | End: 2024-06-28

## 2024-06-28 RX ORDER — PALONOSETRON 0.05 MG/ML
0.25 INJECTION, SOLUTION INTRAVENOUS ONCE
Status: COMPLETED | OUTPATIENT
Start: 2024-06-28 | End: 2024-06-28

## 2024-06-28 RX ORDER — HEPARIN SODIUM (PORCINE) LOCK FLUSH IV SOLN 100 UNIT/ML 100 UNIT/ML
500 SOLUTION INTRAVENOUS AS NEEDED
Status: DISCONTINUED | OUTPATIENT
Start: 2024-06-28 | End: 2024-06-29 | Stop reason: HOSPADM

## 2024-06-28 RX ORDER — SODIUM CHLORIDE 9 MG/ML
20 INJECTION, SOLUTION INTRAVENOUS ONCE
Status: COMPLETED | OUTPATIENT
Start: 2024-06-28 | End: 2024-06-28

## 2024-06-28 RX ORDER — DOXORUBICIN HYDROCHLORIDE 2 MG/ML
50 INJECTION, SOLUTION INTRAVENOUS ONCE
Status: COMPLETED | OUTPATIENT
Start: 2024-06-28 | End: 2024-06-28

## 2024-06-28 RX ADMIN — PALONOSETRON HYDROCHLORIDE 0.25 MG: 0.25 INJECTION INTRAVENOUS at 10:38

## 2024-06-28 RX ADMIN — DEXAMETHASONE SODIUM PHOSPHATE 12 MG: 10 INJECTION, SOLUTION INTRAMUSCULAR; INTRAVENOUS at 10:40

## 2024-06-28 RX ADMIN — PEGFILGRASTIM 6 MG: KIT SUBCUTANEOUS at 13:22

## 2024-06-28 RX ADMIN — SODIUM CHLORIDE 20 ML/HR: 9 INJECTION, SOLUTION INTRAVENOUS at 10:37

## 2024-06-28 RX ADMIN — CYCLOPHOSPHAMIDE 960 MG: 2 INJECTION INTRAVENOUS at 11:57

## 2024-06-28 RX ADMIN — HEPARIN 500 UNITS: 100 SYRINGE at 13:35

## 2024-06-28 RX ADMIN — FLUOROURACIL 1000 MG: 50 INJECTION, SOLUTION INTRAVENOUS at 13:01

## 2024-06-28 RX ADMIN — Medication 20 ML: at 13:35

## 2024-06-28 RX ADMIN — FOSAPREPITANT DIMEGLUMINE 150 MG: 150 INJECTION, POWDER, LYOPHILIZED, FOR SOLUTION INTRAVENOUS at 10:41

## 2024-06-28 RX ADMIN — DOXORUBICIN HYDROCHLORIDE 48 MG: 2 INJECTION, SOLUTION INTRAVENOUS at 11:42

## 2024-07-02 ENCOUNTER — DOCUMENTATION (OUTPATIENT)
Dept: OTHER | Facility: HOSPITAL | Age: 49
End: 2024-07-02
Payer: COMMERCIAL

## 2024-07-02 NOTE — PROGRESS NOTES
SW attempted to reach pt to discuss wig and head covering resources. Pt was unavailable, therefore SW left voicemail with contact information for pt to return call at her convenience. SW will be available ongoing.

## 2024-07-19 ENCOUNTER — HOSPITAL ENCOUNTER (OUTPATIENT)
Dept: ONCOLOGY | Facility: HOSPITAL | Age: 49
Discharge: HOME | End: 2024-07-19
Payer: COMMERCIAL

## 2024-07-22 ENCOUNTER — TELEPHONE (OUTPATIENT)
Dept: ONCOLOGY | Facility: CLINIC | Age: 49
End: 2024-07-22
Payer: COMMERCIAL

## 2024-07-22 ENCOUNTER — TELEPHONE (OUTPATIENT)
Dept: ONCOLOGY | Facility: CLINIC | Age: 49
End: 2024-07-22

## 2024-07-22 DIAGNOSIS — C50.811 MALIGNANT NEOPLASM OF OVERLAPPING SITES OF RIGHT BREAST IN FEMALE, ESTROGEN RECEPTOR NEGATIVE: ICD-10-CM

## 2024-07-22 DIAGNOSIS — C50.811 MALIGNANT NEOPLASM OF OVERLAPPING SITES OF BOTH BREASTS IN FEMALE, ESTROGEN RECEPTOR POSITIVE: Primary | ICD-10-CM

## 2024-07-22 DIAGNOSIS — C50.812 MALIGNANT NEOPLASM OF OVERLAPPING SITES OF BOTH BREASTS IN FEMALE, ESTROGEN RECEPTOR POSITIVE: Primary | ICD-10-CM

## 2024-07-22 DIAGNOSIS — Z17.0 MALIGNANT NEOPLASM OF OVERLAPPING SITES OF BOTH BREASTS IN FEMALE, ESTROGEN RECEPTOR POSITIVE: Primary | ICD-10-CM

## 2024-07-22 DIAGNOSIS — Z17.1 MALIGNANT NEOPLASM OF OVERLAPPING SITES OF RIGHT BREAST IN FEMALE, ESTROGEN RECEPTOR NEGATIVE: ICD-10-CM

## 2024-07-22 NOTE — TELEPHONE ENCOUNTER
Caller: Ashlee Marte    Relationship to patient: Self    Best call back number: [phone]    Chief complaint: R/S     Type of visit: LAB, F/U 1 & INFUSION     Requested date: ASAP     If rescheduling, when is the original appointment: 7/19/24

## 2024-07-22 NOTE — TELEPHONE ENCOUNTER
Caller: Ashlee Marte    Relationship: Self    Best call back number: [phone]     What is the best time to reach you: ASAP IF NEEDED    Who are you requesting to speak with (clinical staff, provider,  specific staff member): CLINICAL        What was the call regarding: PT WANTS TO HAVE HER LAB ORDERS PUT IN THE SYSTEM AS SOON AS POSSIBLE SO SHE CAN GO AHEAD AND HAVE THEM DONE AT HER OFFICE BEFORE SHE COMES FOR HER APPT WITH DR NGO.

## 2024-07-23 ENCOUNTER — LAB (OUTPATIENT)
Dept: FAMILY MEDICINE CLINIC | Facility: CLINIC | Age: 49
End: 2024-07-23
Payer: COMMERCIAL

## 2024-07-24 LAB
ALBUMIN SERPL-MCNC: 4 G/DL (ref 3.9–4.9)
ALP SERPL-CCNC: 98 IU/L (ref 44–121)
ALT SERPL-CCNC: 7 IU/L (ref 0–32)
AST SERPL-CCNC: 14 IU/L (ref 0–40)
BASOPHILS # BLD AUTO: 0.1 X10E3/UL (ref 0–0.2)
BASOPHILS NFR BLD AUTO: 1 %
BILIRUB SERPL-MCNC: 0.3 MG/DL (ref 0–1.2)
BUN SERPL-MCNC: 11 MG/DL (ref 6–24)
BUN/CREAT SERPL: 13 (ref 9–23)
CALCIUM SERPL-MCNC: 9.3 MG/DL (ref 8.7–10.2)
CHLORIDE SERPL-SCNC: 103 MMOL/L (ref 96–106)
CO2 SERPL-SCNC: 24 MMOL/L (ref 20–29)
CREAT SERPL-MCNC: 0.83 MG/DL (ref 0.57–1)
EGFRCR SERPLBLD CKD-EPI 2021: 86 ML/MIN/1.73
EOSINOPHIL # BLD AUTO: 0 X10E3/UL (ref 0–0.4)
EOSINOPHIL NFR BLD AUTO: 0 %
ERYTHROCYTE [DISTWIDTH] IN BLOOD BY AUTOMATED COUNT: 20.9 % (ref 11.7–15.4)
GLOBULIN SER CALC-MCNC: 2.4 G/DL (ref 1.5–4.5)
GLUCOSE SERPL-MCNC: 96 MG/DL (ref 70–99)
HCT VFR BLD AUTO: 35.3 % (ref 34–46.6)
HGB BLD-MCNC: 10.2 G/DL (ref 11.1–15.9)
IMM GRANULOCYTES # BLD AUTO: 0 X10E3/UL (ref 0–0.1)
IMM GRANULOCYTES NFR BLD AUTO: 0 %
LYMPHOCYTES # BLD AUTO: 1.7 X10E3/UL (ref 0.7–3.1)
LYMPHOCYTES NFR BLD AUTO: 23 %
MCH RBC QN AUTO: 23 PG (ref 26.6–33)
MCHC RBC AUTO-ENTMCNC: 28.9 G/DL (ref 31.5–35.7)
MCV RBC AUTO: 80 FL (ref 79–97)
MONOCYTES # BLD AUTO: 0.6 X10E3/UL (ref 0.1–0.9)
MONOCYTES NFR BLD AUTO: 8 %
NEUTROPHILS # BLD AUTO: 4.7 X10E3/UL (ref 1.4–7)
NEUTROPHILS NFR BLD AUTO: 68 %
PLATELET # BLD AUTO: 671 X10E3/UL (ref 150–450)
POTASSIUM SERPL-SCNC: 4.4 MMOL/L (ref 3.5–5.2)
PROT SERPL-MCNC: 6.4 G/DL (ref 6–8.5)
RBC # BLD AUTO: 4.44 X10E6/UL (ref 3.77–5.28)
SODIUM SERPL-SCNC: 139 MMOL/L (ref 134–144)
WBC # BLD AUTO: 7 X10E3/UL (ref 3.4–10.8)

## 2024-07-25 ENCOUNTER — OFFICE VISIT (OUTPATIENT)
Dept: ONCOLOGY | Facility: CLINIC | Age: 49
End: 2024-07-25
Payer: COMMERCIAL

## 2024-07-25 ENCOUNTER — DOCUMENTATION (OUTPATIENT)
Dept: NUTRITION | Facility: HOSPITAL | Age: 49
End: 2024-07-25
Payer: COMMERCIAL

## 2024-07-25 ENCOUNTER — HOSPITAL ENCOUNTER (OUTPATIENT)
Dept: ONCOLOGY | Facility: HOSPITAL | Age: 49
Discharge: HOME | End: 2024-07-25
Payer: COMMERCIAL

## 2024-07-25 VITALS
DIASTOLIC BLOOD PRESSURE: 89 MMHG | BODY MASS INDEX: 33.46 KG/M2 | HEART RATE: 98 BPM | WEIGHT: 196 LBS | OXYGEN SATURATION: 95 % | RESPIRATION RATE: 18 BRPM | HEIGHT: 64 IN | TEMPERATURE: 97.8 F | SYSTOLIC BLOOD PRESSURE: 152 MMHG

## 2024-07-25 DIAGNOSIS — Z17.1 MALIGNANT NEOPLASM OF OVERLAPPING SITES OF RIGHT BREAST IN FEMALE, ESTROGEN RECEPTOR NEGATIVE: ICD-10-CM

## 2024-07-25 DIAGNOSIS — C50.812 MALIGNANT NEOPLASM OF OVERLAPPING SITES OF BOTH BREASTS IN FEMALE, ESTROGEN RECEPTOR POSITIVE: Primary | Chronic | ICD-10-CM

## 2024-07-25 DIAGNOSIS — Z17.0 MALIGNANT NEOPLASM OF OVERLAPPING SITES OF BOTH BREASTS IN FEMALE, ESTROGEN RECEPTOR POSITIVE: Primary | ICD-10-CM

## 2024-07-25 DIAGNOSIS — C50.811 MALIGNANT NEOPLASM OF OVERLAPPING SITES OF BOTH BREASTS IN FEMALE, ESTROGEN RECEPTOR POSITIVE: Primary | Chronic | ICD-10-CM

## 2024-07-25 DIAGNOSIS — C50.812 MALIGNANT NEOPLASM OF OVERLAPPING SITES OF BOTH BREASTS IN FEMALE, ESTROGEN RECEPTOR POSITIVE: Primary | ICD-10-CM

## 2024-07-25 DIAGNOSIS — C50.811 MALIGNANT NEOPLASM OF OVERLAPPING SITES OF BOTH BREASTS IN FEMALE, ESTROGEN RECEPTOR POSITIVE: Primary | ICD-10-CM

## 2024-07-25 DIAGNOSIS — C50.811 MALIGNANT NEOPLASM OF OVERLAPPING SITES OF RIGHT BREAST IN FEMALE, ESTROGEN RECEPTOR NEGATIVE: ICD-10-CM

## 2024-07-25 DIAGNOSIS — Z17.0 MALIGNANT NEOPLASM OF OVERLAPPING SITES OF BOTH BREASTS IN FEMALE, ESTROGEN RECEPTOR POSITIVE: Primary | Chronic | ICD-10-CM

## 2024-07-25 DIAGNOSIS — J44.9 CHRONIC OBSTRUCTIVE PULMONARY DISEASE, UNSPECIFIED COPD TYPE: ICD-10-CM

## 2024-07-25 PROCEDURE — 96411 CHEMO IV PUSH ADDL DRUG: CPT

## 2024-07-25 PROCEDURE — 25010000002 FOSAPREPITANT PER 1 MG: Performed by: INTERNAL MEDICINE

## 2024-07-25 PROCEDURE — 25810000003 SODIUM CHLORIDE 0.9 % SOLUTION: Performed by: INTERNAL MEDICINE

## 2024-07-25 PROCEDURE — 96413 CHEMO IV INFUSION 1 HR: CPT

## 2024-07-25 PROCEDURE — 25010000002 FLUOROURACIL PER 500 MG: Performed by: INTERNAL MEDICINE

## 2024-07-25 PROCEDURE — 25010000002 PALONOSETRON PER 25 MCG: Performed by: INTERNAL MEDICINE

## 2024-07-25 PROCEDURE — 25010000002 HEPARIN LOCK FLUSH PER 10 UNITS: Performed by: INTERNAL MEDICINE

## 2024-07-25 PROCEDURE — 96367 TX/PROPH/DG ADDL SEQ IV INF: CPT

## 2024-07-25 PROCEDURE — 96365 THER/PROPH/DIAG IV INF INIT: CPT

## 2024-07-25 PROCEDURE — 25810000003 SODIUM CHLORIDE 0.9 % SOLUTION 250 ML FLEX CONT: Performed by: INTERNAL MEDICINE

## 2024-07-25 PROCEDURE — 25010000002 DOXORUBICIN PER 10 MG: Performed by: INTERNAL MEDICINE

## 2024-07-25 PROCEDURE — 25010000002 DEXAMETHASONE SODIUM PHOSPHATE 100 MG/10ML SOLUTION: Performed by: INTERNAL MEDICINE

## 2024-07-25 PROCEDURE — 96374 THER/PROPH/DIAG INJ IV PUSH: CPT

## 2024-07-25 PROCEDURE — 96409 CHEMO IV PUSH SNGL DRUG: CPT

## 2024-07-25 PROCEDURE — 96377 APPLICATON ON-BODY INJECTOR: CPT

## 2024-07-25 PROCEDURE — 25010000002 PEGFILGRASTIM 6 MG/0.6ML PREFILLED SYRINGE KIT: Performed by: INTERNAL MEDICINE

## 2024-07-25 PROCEDURE — 96375 TX/PRO/DX INJ NEW DRUG ADDON: CPT

## 2024-07-25 PROCEDURE — 25010000002 CYCLOPHOSPHAMIDE 2 GM/10ML SOLUTION 10 ML VIAL: Performed by: INTERNAL MEDICINE

## 2024-07-25 RX ORDER — SODIUM CHLORIDE 9 MG/ML
20 INJECTION, SOLUTION INTRAVENOUS ONCE
Status: CANCELLED | OUTPATIENT
Start: 2024-07-25

## 2024-07-25 RX ORDER — HEPARIN SODIUM (PORCINE) LOCK FLUSH IV SOLN 100 UNIT/ML 100 UNIT/ML
500 SOLUTION INTRAVENOUS AS NEEDED
Status: DISCONTINUED | OUTPATIENT
Start: 2024-07-25 | End: 2024-07-27 | Stop reason: HOSPADM

## 2024-07-25 RX ORDER — FLUOROURACIL 50 MG/ML
500 INJECTION, SOLUTION INTRAVENOUS ONCE
Status: CANCELLED | OUTPATIENT
Start: 2024-07-25

## 2024-07-25 RX ORDER — SODIUM CHLORIDE 9 MG/ML
20 INJECTION, SOLUTION INTRAVENOUS ONCE
Status: COMPLETED | OUTPATIENT
Start: 2024-07-25 | End: 2024-07-25

## 2024-07-25 RX ORDER — PALONOSETRON 0.05 MG/ML
0.25 INJECTION, SOLUTION INTRAVENOUS ONCE
Status: CANCELLED | OUTPATIENT
Start: 2024-07-25

## 2024-07-25 RX ORDER — HEPARIN SODIUM (PORCINE) LOCK FLUSH IV SOLN 100 UNIT/ML 100 UNIT/ML
500 SOLUTION INTRAVENOUS AS NEEDED
OUTPATIENT
Start: 2024-07-25

## 2024-07-25 RX ORDER — FLUOROURACIL 50 MG/ML
1000 INJECTION, SOLUTION INTRAVENOUS ONCE
Status: COMPLETED | OUTPATIENT
Start: 2024-07-25 | End: 2024-07-25

## 2024-07-25 RX ORDER — PALONOSETRON 0.05 MG/ML
0.25 INJECTION, SOLUTION INTRAVENOUS ONCE
Status: COMPLETED | OUTPATIENT
Start: 2024-07-25 | End: 2024-07-25

## 2024-07-25 RX ORDER — DOXORUBICIN HYDROCHLORIDE 2 MG/ML
50 INJECTION, SOLUTION INTRAVENOUS ONCE
Status: CANCELLED | OUTPATIENT
Start: 2024-07-25

## 2024-07-25 RX ORDER — DOXORUBICIN HYDROCHLORIDE 2 MG/ML
50 INJECTION, SOLUTION INTRAVENOUS ONCE
Status: COMPLETED | OUTPATIENT
Start: 2024-07-25 | End: 2024-07-25

## 2024-07-25 RX ORDER — SODIUM CHLORIDE 0.9 % (FLUSH) 0.9 %
20 SYRINGE (ML) INJECTION AS NEEDED
OUTPATIENT
Start: 2024-07-25

## 2024-07-25 RX ADMIN — HEPARIN 500 UNITS: 100 SYRINGE at 12:44

## 2024-07-25 RX ADMIN — FOSAPREPITANT 150 MG: 150 INJECTION, POWDER, LYOPHILIZED, FOR SOLUTION INTRAVENOUS at 10:16

## 2024-07-25 RX ADMIN — CYCLOPHOSPHAMIDE 960 MG: 2 INJECTION INTRAVENOUS at 11:49

## 2024-07-25 RX ADMIN — PALONOSETRON HYDROCHLORIDE 0.25 MG: 0.25 INJECTION INTRAVENOUS at 10:16

## 2024-07-25 RX ADMIN — DEXAMETHASONE SODIUM PHOSPHATE 12 MG: 10 INJECTION, SOLUTION INTRAMUSCULAR; INTRAVENOUS at 10:17

## 2024-07-25 RX ADMIN — PEGFILGRASTIM 6 MG: KIT SUBCUTANEOUS at 12:39

## 2024-07-25 RX ADMIN — DOXORUBICIN HYDROCHLORIDE 48 MG: 2 INJECTION, SOLUTION INTRAVENOUS at 11:27

## 2024-07-25 RX ADMIN — FLUOROURACIL 1000 MG: 50 INJECTION, SOLUTION INTRAVENOUS at 12:33

## 2024-07-25 RX ADMIN — SODIUM CHLORIDE 20 ML/HR: 9 INJECTION, SOLUTION INTRAVENOUS at 10:16

## 2024-07-25 NOTE — PROGRESS NOTES
Onc Nutrition    Patient: Ashlee Marte  YOB: 1975    Diagnosis: bilateral breast cancer   -Right side invasive ductal ER positive DE positive HER2 negative, grade 2. Pathologic 1.1 cm T1c N0 on mastectomy. Recurrence score 44.   -Left side invasive ductal ER negative, DE weakly positive, HER2/nabila negative, grade 3. Pathologic T1 a N0 with no residual tumor on mastectomy      Surgery: bilateral mastectomy - 3/21/24     Chemotherapy: OP BREAST TC DOCEtaxel / Cyclophosphamide - every 21 days x 4 cycles (d/c'd after cycle 2 due to intolerance)    Current Chemo: OP BREAST FAC DOXOrubicin / Cyclophosphamide / Fluorouracil - every 21 days (cycle 2)     Hormonal Blockade: Once chemotherapy is completed -  Arimidex for 10 years.    Weight - 196#      Follow up with patient during her infusion appointment.  Note patient recently started on a new treatment regimen due to intolerance of previous chemo.  She reports tolerating her new treatment much better.  She states appetite is good and has been eating her usual amount.  She denies significant nutritional complaints at this time.  She states she received written diet materials that were mailed to her after initial RD consult.     Reviewed the importance of good nutritional intake throughout her treatments.  Advised her to be consuming smaller portions more often throughout the day to aid with potential nausea management.  Discussed the importance of protein and its role in the diet, reviewed high protein foods, and recommended she include protein at each meal / snack.  Also discussed the importance of hydration and recommended she increase her intake with a goal of greater than 64 ounces daily.      Answered her questions and she voiced understanding of information discussed.  Encouraged to call RD with questions.  Will follow up as indicated.     Gena Burris, LITO  07/25/24

## 2024-07-25 NOTE — LETTER
July 25, 2024       No Recipients    Patient: Ashlee Marte   YOB: 1975   Date of Visit: 7/25/2024     Dear SHAVON Lynch:       Thank you for referring Ashlee Marte to me for evaluation. Below are the relevant portions of my assessment and plan of care.    If you have questions, please do not hesitate to call me. I look forward to following Ashlee along with you.         Sincerely,        Corky Youngblood MD        CC:   No Recipients    Corky Youngblood MD  07/25/24 0819  Sign when Signing Visit  CHIEF COMPLAINT: No somatic complaints    Problem List:  Oncology/Hematology History Overview Note   1.  Bilateral breast cancer:  -Right side invasive ductal clinical T1c N0, 1.4 centimeter, ER positive NE positive HER2 negative, grade 2.  Pathologic 1.1 cm T1c N0 on mastectomy.  Recurrence score 44.  Distant recurrence risk on hormone blockade alone 32%. > 15% absolute chemotherapeutic benefit.  -Left side invasive ductal clinical T1b N0, 8 MM, ER negative, NE weakly positive, HER2/nabila negative, essentially triple negative grade 3.  Pathologic T1 a N0 with no residual tumor on mastectomy  Initial bilateral mastectomy recommended.    2.  COPD    Oncology history timeline:  -1/11/2024 bilateral screening mammogram [facility] showed asymmetry right breast additional views needed with mass in left breast requiring additional imaging  -2/1/2024 bilateral diagnostic mammogram [facility] showed persistent focal asymmetry right 12:00 7 cm from the nipple 7 mm hypoechoic mass indistinct with irregular margins ultrasound-guided biopsy recommended.  Left breast showed 8 mm 3:00 oval mass 10 cm from the nipple that on the ultrasound was 6 x 5 x 5 mm.  Incidental 4 mm 2:00 hypoechoic mass with internal vascularity 5 cm from the nipple for which ultrasound-guided biopsy recommended  -2/5/2024 right ultrasound-guided core biopsy 7  mm mass with HydroMARK T4 shaped tissue marker placed at the biopsy site.  Left ultrasound breast core biopsy 3:00 6 mm mass and 2:00 4 mm mass with HydroMARK T3 and T4 respectively.  Right breast 12:00 lesion grade 2 invasive ductal Jimenez-Cui 6 out of 9, 4 mm.  % 3+, KY 50% 3+.  Also intermediate grade ductal carcinoma in situ  Left breast 3:00 invasive ductal carcinoma grade 3 Bloom-Cui 9 out of 9, 5-6 mm, ER 0%, KY 25% 1+, HER2/nabila negative 1+  Left breast 2:00 core biopsy fibroadenoma  -2/12/2024 office note Dr. Gregorio Ashton indicates patient with newly diagnosed bilateral breast cancer.  Screening mammogram showed 2 abnormalities in the left breast and 1 in the right.    No personal or family history of breast cancer.  Has hot flashes but not taking estrogen supplements.  -2/13/2024 cervical spine x-ray negative    -2/15/2024 [facility] medical oncology follow-up: Patient has bilateral breast cancers as outlined on routine screening mammogram without any other symptoms.  After her diagnosis, she has had muscle tension aches in the left shoulder which she has a tens unit and recent injection of steroids by her primary care.  This is distinctly in the muscle she says and not in her bones.2/13/2024 cervical spine x-ray has C7 obscured by overlapping bone and soft tissues but otherwise unremarkable. Recent CBC and CMP had normal bone enzymes and was otherwise unremarkable.  Hence I would not make much out of the neck pain in the way of concern for metastatic disease unless this worsens.   She is under a lot of psychological strain from this diagnosis and is quite anxious and I will refer her to Ashlee Nayak are oncology psychiatrist and we will get our breast cancer navigator working with her.  The 7 mm right breast tumor is grade 2  % 3+, KY 50% 3+, HER2/nabila 0+.  The left breast is clinically 6 mm grade 3 invasive ductal ER negative KY weakly positive HER2/nabila 1 negative essentially triple  negative.  We will get MRI of her breasts and if the lesion in the left breast turns out to be larger than 1 cm mammographically or is node positive then we would give neoadjuvant therapy.  If not, both for the 7 mm right breast and the 8 mm left breast tumor I would consider primary resection.  She has just started a job with [employer] in [place] and her insurance changes to [facility] in 2 weeks and she wants her surgery done in the [facility] facility for insurance reasons.  I will discuss this with Dr. Ashton (he was scrubbed when I called today) whose skills I recommended to her but we will make appropriate referrals per her request.  With her triple negativity she will also get genetic counseling.  Absent any other somatic complaints and assuming her neck continues to improve I would do no additional staging imaging in the absence of such symptoms.  We will also present her at our multidisciplinary tumor board once the MRI is completed for final decision making.She is committed to bilateral mammograms regardless of the results of the MRI or genetic testing.  Genetic testing is done on all triple negatives.She does have a history of gastric sleeve but still needs to lose weight.  She also carries a diagnosis of COPD and I am not sure who has managed that and Dr. QUAN may need her cleared by primary care/pulmonary before surgery but I will leave it to him.  She is not oxygen requiring.    -2/21/2024 bilateral breast MRI:  Right breast 12:00, 8 cm from nipple, 1.4 cm enhancing mass consistent with biopsy and no additional right breast lesions.  Left breast 2:00 5 cm from nipple is 8 mm masslike enhancement with central signal void artifact corresponding to biopsy site.  No additional suspicious sites in the left breast.  No internal mammary nor axillary salazar involvement on either side    -3/1/2024 [facility] medical oncology follow-up: We reviewed her MRI at multidisciplinary tumor board.  For the right breast cancer  she would have surgery primarily followed by adjuvant hormone blockade but would not do Oncotype as, for the left breast for which we plan surgery upfront for the 8 mm size of tumor triple negative she will need chemo regardless.  If the left breast lesion ends up less than 1 cm node-negative then we would give her Taxotere Cytoxan x 4.    If the left breast lesion is over a centimeter but less than 2 cm, then I would add Adriamycin.  If the left breast lesion is over 2 cm pathologically or node positive, then I would add carboplatin and Keytruda.  For the right breast cancer I would give her at least 5 and, if she is tolerating, 10 years of hormone blockade.  She had gone 1 year without menses but just started spotting.  I contacted Dr. Ramirez who will see her in [facility] office today for pelvic exam and to get hormone levels and to get ultrasound scheduled.  She has COPD without pulmonary function since prior to her surgery for bariatric surgery in 2019.  She smoked until November.  She is not requiring oxygen and uses her inhalers efficiently without major symptoms.  I spoke with Dr. QUAN and he is comfortable with proceeding without pulmonary function tests and he will get preoperative ABG.  I will see her back in about a month which will give her time to hopefully have healed from her surgery and we can then make plans for adjuvant therapy based upon the above algorithm.  She will need a port but Dr. QUAN does not want to place this during the time of surgery but he will make arrangements to do that postoperatively.    -3/21/2024 evacuation left mastectomy hematomaWith bilateral skin sparing mastectomies and bilateral sentinel node injection and biopsies.  Left breast mastectomy pathology shows no residual carcinoma and 1 benign sentinel node.  Pathologic T1 a N0 WY weakly positive essentially triple negative  Right breast mastectomy pathology shows 1.1 cm grade 3 invasive ductal carcinoma, 2 benign sentinel  nodes pathologic T1c N0 ER/NJ positive HER2/nabila negative.  Oncotype score 44 distant recurrence risk at 9 years 32%.  Greater than 15% absolute benefit from chemotherapy.    -4/2/2024 [facility] medical oncology follow-up: I reviewed the above bilateral mastectomy pathology reports with her.  Wounds are healing well though still with significant healing yet to do and still has drains in place and is due to see Dr. QUAN tomorrow.  Her left breast had no residual tumor and no lymph node involvement for a pathological T1a N0 weakly NJ positive essentially triple negative breast cancer for which I have placed orders for Taxotere Cytoxan x 4.  For her right breast mastectomy which is healing well, she had a 1.1 cm grade 3 invasive ductal carcinoma with 2 benign sentinel nodes pathological T1c N0 ER and NJ positive HER2/nabila negative for which I will treat her with Arimidex x 10 years following her chemotherapy.  We will get her to Dr. QUAN for port placement and she will have chemo preparation visit in our [place] office and we will start treatment in 2 weeks assuming Dr. QUAN has cleared her surgically.  No need for radiation given bilateral mastectomies.  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    -4/9/2024 chemotherapy education preparation and needs assessment completed    -4/12/2024 Genetic testing was negative for pathogenic mutations in BRCA1/2 and 46 additional genes on the Common Hereditary Cancers panel.      -5/2/2024 treatment began with TC for a planned 4 courses    -5/2/2024 [facility] oncology clinic follow-up: Ashlee is ready to begin adjuvant therapy with TC today for a planned 4 courses.  Her drains have been removed, she still has sutures in place but her mastectomy scars appear well-healed.  I reviewed labs from yesterday, counts acceptable to continue treatment today.  She is taking her dexamethasone that she started yesterday pretreatment for 3 days.  Once she  completes chemotherapy we will plan on Arimidex for 10 years.  Her genetic testing was negative.     - 6/14/2024 [facility] oncology telemedicine follow-up: She received cycle 1 TC on 5/2/2024.  13 minutes into her docetaxel, she reported severe bilateral low back and hip pain flushing and diaphoresis with mild abdominal discomfort and anxiousness.  Reaction medications administered.  Docetaxel restarted half rate and then increase to original rate and the remainder of the infusion well-tolerated.  She went to the emergency room 5/10/2024 with nausea vomiting and weakness lasting the previous 3 days concerned about dehydration.  5/21/2024 hemoglobin 8.3 platelets 844,000 with MCV 79.6 otherwise normal CBC and differential.  Potassium 5.3 normal creatinine otherwise unremarkable CMP.  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  As she has a very high recurrence score, I would try the Taxotere again with institution of all the rescue meds upfront and 0.5 mg IV Ativan premed.  We will do this next week in our [place] office and if she tolerates that we will see her back 3 weeks later for cycle 3 of 4.      -6/21/2024 note: Called to infusion.  Patient had had all the rescue occasions given upfront and despite that had terrible back pain.  Not as excruciating as the first cycle but still too much to handle.  No wheezing or rashes but nonetheless concerning for potential infusion allergic reaction.  In the literature looking at docetaxel after paclitaxel infusion reactions, there was a 50% chance of reaction suggesting that it is the taxane moiety and not just the vehicle causing the reaction in all cases.  Hence, I am hesitant to use Taxol in place of Taxotere.  To that end, I will get her ejection fraction and  assuming it is normal we will try 6 cycles of CAF.  Prozac has a category X potential increase of Adriamycin levels.  She is prescribed this by her primary care.  I will discontinue and substitute Effexor XR 75 mg daily which does not have this interaction.  Total time attending to patient and changing these plans and putting in new prescriptions took 1 hour patient care time today        -6/26/2024 echocardiogram ejection fraction 51-55%.  GLS -22%  CAF PEG filgrastim Pharm.D. education [place]     -6/28/2024 CAF cycle 1/6    -7/19/2024 patient no showed         Malignant neoplasm of overlapping sites of both breasts in female, estrogen receptor positive   5/1/2024 -  Chemotherapy    OP CENTRAL VENOUS ACCESS DEVICE Access, Care, and Maintenance (CVAD)     5/2/2024 - 6/21/2024 Chemotherapy    OP BREAST TC DOCEtaxel / Cyclophosphamide     6/21/2024 -  Chemotherapy    OP CVAD Occlusion - Alteplase     6/28/2024 -  Chemotherapy    OP BREAST FAC DOXOrubicin / Cyclophosphamide / Fluorouracil     Malignant neoplasm of overlapping sites of right female breast   3/21/2024 Initial Diagnosis    Malignant neoplasm of overlapping sites of right female breast     5/2/2024 - 6/21/2024 Chemotherapy    OP BREAST TC DOCEtaxel / Cyclophosphamide     6/28/2024 -  Chemotherapy    OP BREAST FAC DOXOrubicin / Cyclophosphamide / Fluorouracil         HISTORY OF PRESENT ILLNESS:  The patient is a 48 y.o. female, here for follow up on management of adjuvant therapy breast cancer    Past Medical History:   Diagnosis Date   • Asthma    • Breast cancer    • COPD (chronic obstructive pulmonary disease)    • Depression     history     Past Surgical History:   Procedure Laterality Date   • ENDOSCOPY      EGD x2   • GASTRECTOMY      SLEEVE   • HEMATOMA EVACUATION TRUNK Bilateral 3/21/2024    Procedure: HEMATOMA EVACUATION TRUNK;  Surgeon: Phuong Loredo MD;  Location: [facility];  Service: General;  Laterality: Bilateral;   • MASTECTOMY  "W/ SENTINEL NODE BIOPSY Bilateral 3/21/2024    Procedure: BREAST MASTECTOMY WITH SENTINEL NODE BIOPSY BILATERAL;  Surgeon: Phuong Loredo MD;  Location: [facility];  Service: General;  Laterality: Bilateral;       No Known Allergies    Family History and Social History reviewed and changed as necessary    REVIEW OF SYSTEM:   No somatic complaints    PHYSICAL EXAM:  No jaundice icterus or pallor.  No respiratory distress.  No rashes.    Vitals:    07/25/24 0805   BP: 152/89   Pulse: 98   Resp: 18   Temp: 97.8 °F (36.6 °C)   SpO2: 95%   Weight: 88.9 kg (196 lb)   Height: 161.3 cm (63.5\")     Vitals:    07/25/24 0805   PainSc: 0-No pain          ECOG score: 0           Vitals reviewed.    ECOG: (0) Fully Active - Able to Carry On All Pre-disease Performance Without Restriction    Lab Results   Component Value Date    HGB 10.2 (L) 07/23/2024    HCT 35.3 07/23/2024    MCV 80 07/23/2024     (H) 07/23/2024    WBC 7.0 07/23/2024    NEUTROABS 4.7 07/23/2024    LYMPHSABS 1.7 07/23/2024    MONOSABS 0.6 07/23/2024    EOSABS 0.0 07/23/2024    BASOSABS 0.1 07/23/2024       Lab Results   Component Value Date    GLUCOSE 96 07/23/2024    BUN 11 07/23/2024    CREATININE 0.83 07/23/2024     07/23/2024    K 4.4 07/23/2024     07/23/2024    CO2 24 07/23/2024    CALCIUM 9.3 07/23/2024    PROTEINTOT 6.6 06/21/2024    ALBUMIN 4.0 07/23/2024    BILITOT 0.3 07/23/2024    ALKPHOS 98 07/23/2024    AST 14 07/23/2024    ALT 7 07/23/2024             ASSESSMENT & PLAN:  1.  Bilateral breast cancer:  -Right side invasive ductal clinical T1c N0, 1.4 centimeter, ER positive NJ positive HER2 negative, grade 2.  Pathologic 1.1 cm T1c N0 on mastectomy.  Recurrence score 44.  Distant recurrence risk on hormone blockade alone 32%. > 15% absolute chemotherapeutic benefit.  -Left side invasive ductal clinical T1b N0, 8 MM, ER negative, NJ weakly positive, HER2/nabila negative, essentially triple negative grade 3.  Pathologic T1 a N0 " with no residual tumor on mastectomy  Initial bilateral mastectomy recommended.    - Negative genetic testing.  -Received 2 cycles of TC and despite heavy premedication could not tolerate with reaction in the chair.  Hence, switch to CAF x 6 on 6/28/2024.  2.  COPD    Oncology history timeline:  -1/11/2024 bilateral screening mammogram [facility] read [facility] imaging showed asymmetry right breast additional views needed with mass in left breast requiring additional imaging  -2/1/2024 bilateral diagnostic mammogram [facility] showed persistent focal asymmetry right 12:00 7 cm from the nipple 7 mm hypoechoic mass indistinct with irregular margins ultrasound-guided biopsy recommended.  Left breast showed 8 mm 3:00 oval mass 10 cm from the nipple that on the ultrasound was 6 x 5 x 5 mm.  Incidental 4 mm 2:00 hypoechoic mass with internal vascularity 5 cm from the nipple for which ultrasound-guided biopsy recommended  -2/5/2024 right ultrasound-guided core biopsy 7 mm mass with HydroMARK T4 shaped tissue marker placed at the biopsy site.  Left ultrasound breast core biopsy 3:00 6 mm mass and 2:00 4 mm mass with HydroMARK T3 and T4 respectively.  Right breast 12:00 lesion grade 2 invasive ductal Jimenez-Cui 6 out of 9, 4 mm.  % 3+, ND 50% 3+.  Also intermediate grade ductal carcinoma in situ  Left breast 3:00 invasive ductal carcinoma grade 3 Bloom-Cui 9 out of 9, 5-6 mm, ER 0%, ND 25% 1+, HER2/nabila negative 1+  Left breast 2:00 core biopsy fibroadenoma  -2/12/2024 office note Dr. Gregorio Ashton indicates patient with newly diagnosed bilateral breast cancer.  Screening mammogram showed 2 abnormalities in the left breast and 1 in the right.    No personal or family history of breast cancer.  Has hot flashes but not taking estrogen supplements.  -2/13/2024 cervical spine x-ray negative    -2/15/2024 [facility] medical oncology follow-up: Patient has  bilateral breast cancers as outlined on routine screening mammogram without any other symptoms.  After her diagnosis, she has had muscle tension aches in the left shoulder which she has a tens unit and recent injection of steroids by her primary care.  This is distinctly in the muscle she says and not in her bones.2/13/2024 cervical spine x-ray has C7 obscured by overlapping bone and soft tissues but otherwise unremarkable. Recent CBC and CMP had normal bone enzymes and was otherwise unremarkable.  Hence I would not make much out of the neck pain in the way of concern for metastatic disease unless this worsens.   She is under a lot of psychological strain from this diagnosis and is quite anxious and I will refer her to Ashlee Nayak are oncology psychiatrist and we will get our breast cancer navigator working with her.  The 7 mm right breast tumor is grade 2  % 3+, MN 50% 3+, HER2/nabila 0+.  The left breast is clinically 6 mm grade 3 invasive ductal ER negative MN weakly positive HER2/nabila 1 negative essentially triple negative.  We will get MRI of her breasts and if the lesion in the left breast turns out to be larger than 1 cm mammographically or is node positive then we would give neoadjuvant therapy.  If not, both for the 7 mm right breast and the 8 mm left breast tumor I would consider primary resection.  She has just started a job with [employer] in [place] and her insurance changes to [employer] in 2 weeks and she wants her surgery done in the [facility] facility for insurance reasons.  I will discuss this with Dr. Ashton (he was scrubbed when I called today) whose skills I recommended to her but we will make appropriate referrals per her request.  With her triple negativity she will also get genetic counseling.  Absent any other somatic complaints and assuming her neck continues to improve I would do no additional staging imaging in the absence of such symptoms.  We will also present her at our  multidisciplinary tumor board once the MRI is completed for final decision making.She is committed to bilateral mammograms regardless of the results of the MRI or genetic testing.  Genetic testing is done on all triple negatives.She does have a history of gastric sleeve but still needs to lose weight.  She also carries a diagnosis of COPD and I am not sure who has managed that and Dr. QUAN may need her cleared by primary care/pulmonary before surgery but I will leave it to him.  She is not oxygen requiring.    -2/21/2024 bilateral breast MRI:  Right breast 12:00, 8 cm from nipple, 1.4 cm enhancing mass consistent with biopsy and no additional right breast lesions.  Left breast 2:00 5 cm from nipple is 8 mm masslike enhancement with central signal void artifact corresponding to biopsy site.  No additional suspicious sites in the left breast.  No internal mammary nor axillary salazar involvement on either side    -3/1/2024 [facility] medical oncology follow-up: We reviewed her MRI at multidisciplinary tumor board.  For the right breast cancer she would have surgery primarily followed by adjuvant hormone blockade but would not do Oncotype as, for the left breast for which we plan surgery upfront for the 8 mm size of tumor triple negative she will need chemo regardless.  If the left breast lesion ends up less than 1 cm node-negative then we would give her Taxotere Cytoxan x 4.    If the left breast lesion is over a centimeter but less than 2 cm, then I would add Adriamycin.  If the left breast lesion is over 2 cm pathologically or node positive, then I would add carboplatin and Keytruda.  For the right breast cancer I would give her at least 5 and, if she is tolerating, 10 years of hormone blockade.  She had gone 1 year without menses but just started spotting.  I contacted Dr. Ramirez who will see her in [facility] office today for pelvic exam and to get hormone levels and to get ultrasound scheduled.  She has COPD  without pulmonary function since prior to her surgery for bariatric surgery in 2019.  She smoked until November.  She is not requiring oxygen and uses her inhalers efficiently without major symptoms.  I spoke with Dr. QUAN and he is comfortable with proceeding without pulmonary function tests and he will get preoperative ABG.  I will see her back in about a month which will give her time to hopefully have healed from her surgery and we can then make plans for adjuvant therapy based upon the above algorithm.  She will need a port but Dr. QUAN does not want to place this during the time of surgery but he will make arrangements to do that postoperatively.    -3/21/2024 evacuation left mastectomy hematomaWith bilateral skin sparing mastectomies and bilateral sentinel node injection and biopsies.  Left breast mastectomy pathology shows no residual carcinoma and 1 benign sentinel node.  Pathologic T1 a N0 WV weakly positive essentially triple negative  Right breast mastectomy pathology shows 1.1 cm grade 3 invasive ductal carcinoma, 2 benign sentinel nodes pathologic T1c N0 ER/WV positive HER2/nabila negative.  Oncotype score 44 distant recurrence risk at 9 years 32%.  Greater than 15% absolute benefit from chemotherapy.    -4/2/2024 [facility] medical oncology follow-up: I reviewed the above bilateral mastectomy pathology reports with her.  Wounds are healing well though still with significant healing yet to do and still has drains in place and is due to see Dr. QUAN tomorrow.  Her left breast had no residual tumor and no lymph node involvement for a pathological T1a N0 weakly WV positive essentially triple negative breast cancer for which I have placed orders for Taxotere Cytoxan x 4.  For her right breast mastectomy which is healing well, she had a 1.1 cm grade 3 invasive ductal carcinoma with 2 benign sentinel nodes pathological T1c N0 ER and WV positive HER2/nabila negative for which I will treat her with Arimidex x 10 years  following her chemotherapy.  We will get her to Dr. QUAN for port placement and she will have chemo preparation visit in our [place] office and we will start treatment in 2 weeks assuming Dr. QUAN has cleared her surgically.  No need for radiation given bilateral mastectomies.  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    -4/9/2024 chemotherapy education preparation and needs assessment completed    -4/12/2024 Genetic testing was negative for pathogenic mutations in BRCA1/2 and 46 additional genes on the Common Hereditary Cancers panel.      -5/2/2024 treatment began with TC for a planned 4 courses    -5/2/2024 [facility] oncology clinic follow-up: Ashlee is ready to begin adjuvant therapy with TC today for a planned 4 courses.  Her drains have been removed, she still has sutures in place but her mastectomy scars appear well-healed.  I reviewed labs from yesterday, counts acceptable to continue treatment today.  She is taking her dexamethasone that she started yesterday pretreatment for 3 days.  Once she completes chemotherapy we will plan on Arimidex for 10 years.  Her genetic testing was negative.     - 6/14/2024 [facility] medical oncology telemedicine follow-up: She received cycle 1 TC on 5/2/2024.  13 minutes into her docetaxel, she reported severe bilateral low back and hip pain flushing and diaphoresis with mild abdominal discomfort and anxiousness.  Reaction medications administered.  Docetaxel restarted half rate and then increase to original rate and the remainder of the infusion well-tolerated.  She went to the emergency room 5/10/2024 with nausea vomiting and weakness lasting the previous 3 days concerned about dehydration.  5/21/2024 hemoglobin 8.3 platelets 844,000 with MCV 79.6 otherwise normal CBC and differential.  Potassium 5.3 normal creatinine otherwise unremarkable CMP.  Due to her prior infusion reaction, Oncotype was sent on her larger hormone receptor  positive tumor and if Oncotype score high then we would premedicate and try the TC again as she went through the treatment well when she had her rescue meds.  As she has a very high recurrence score, I would try the Taxotere again with institution of all the rescue meds upfront and 0.5 mg IV Ativan premed.  We will do this next week in our [place] office and if she tolerates that we will see her back 3 weeks later for cycle 3 of 4.      -6/21/2024 note: Called to infusion.  Patient had had all the rescue occasions given upfront and despite that had terrible back pain.  Not as excruciating as the first cycle but still too much to handle.  No wheezing or rashes but nonetheless concerning for potential infusion allergic reaction.  In the literature looking at docetaxel after paclitaxel infusion reactions, there was a 50% chance of reaction suggesting that it is the taxane moiety and not just the vehicle causing the reaction in all cases.  Hence, I am hesitant to use Taxol in place of Taxotere.  To that end, I will get her ejection fraction and assuming it is normal we will try 6 cycles of CAF.  Prozac has a category X potential increase of Adriamycin levels.  She is prescribed this by her primary care.  I will discontinue and substitute Effexor XR 75 mg daily which does not have this interaction.          -6/26/2024 echocardiogram ejection fraction 51-55%.  GLS -22%  CAF PEG filgrastim Pharm.D. education Debo Moran    -6/28/2024 CAF cycle 1/6    -7/25/2024 [facility] medical oncology follow-up: Tolerated cycle 1 of CAF with no side effects.  Now on Effexor instead of Prozac for the category X interaction with Prozac with no problems and feeling fairly fit other than some fatigue.  Continue 2 out of 6 CAF with follow-up 8/9/2024.  See above for subsequent plans    Total time of care today inclusive of time spent today prior to patient's arrival reviewing interval records including echocardiogram and chemotherapy  orders and experience thereof and during visit interviewing her as to signs or symptoms of her disease and management thereof and after visit instituting this plan took 47 minutes of patient care time throughout the day today  Corky Youngblood MD    07/25/2024

## 2024-07-25 NOTE — PROGRESS NOTES
CHIEF COMPLAINT: No somatic complaints    Problem List:  Oncology/Hematology History Overview Note   1.  Bilateral breast cancer:  -Right side invasive ductal clinical T1c N0, 1.4 centimeter, ER positive HI positive HER2 negative, grade 2.  Pathologic 1.1 cm T1c N0 on mastectomy.  Recurrence score 44.  Distant recurrence risk on hormone blockade alone 32%. > 15% absolute chemotherapeutic benefit.  -Left side invasive ductal clinical T1b N0, 8 MM, ER negative, HI weakly positive, HER2/nabila negative, essentially triple negative grade 3.  Pathologic T1 a N0 with no residual tumor on mastectomy  Initial bilateral mastectomy recommended.    2.  COPD    Oncology history timeline:  -1/11/2024 bilateral screening mammogram [facility] [place] read [facility] [place] imaging showed asymmetry right breast additional views needed with mass in left breast requiring additional imaging  -2/1/2024 bilateral diagnostic mammogram [place] [facility] showed persistent focal asymmetry right 12:00 7 cm from the nipple 7 mm hypoechoic mass indistinct with irregular margins ultrasound-guided biopsy recommended.  Left breast showed 8 mm 3:00 oval mass 10 cm from the nipple that on the ultrasound was 6 x 5 x 5 mm.  Incidental 4 mm 2:00 hypoechoic mass with internal vascularity 5 cm from the nipple for which ultrasound-guided biopsy recommended  -2/5/2024 right ultrasound-guided core biopsy 7 mm mass with HydroMARK T4 shaped tissue marker placed at the biopsy site.  Left ultrasound breast core biopsy 3:00 6 mm mass and 2:00 4 mm mass with HydroMARK T3 and T4 respectively.  Right breast 12:00 lesion grade 2 invasive ductal Jiemnez-Cui 6 out of 9, 4 mm.  % 3+, HI 50% 3+.  Also intermediate grade ductal carcinoma in situ  Left breast 3:00 invasive ductal carcinoma grade 3 Bloom-Cui 9 out of 9, 5-6 mm, ER 0%, HI 25% 1+, HER2/nabila negative 1+  Left breast 2:00 core biopsy  fibroadenoma  -2/12/2024 office note Dr. Gregorio Ashton indicates patient with newly diagnosed bilateral breast cancer.  Screening mammogram showed 2 abnormalities in the left breast and 1 in the right.    No personal or family history of breast cancer.  Has hot flashes but not taking estrogen supplements.  -2/13/2024 cervical spine x-ray negative    -2/15/2024 [facility] medical oncology follow-up: Patient has bilateral breast cancers as outlined on routine screening mammogram without any other symptoms.  After her diagnosis, she has had muscle tension aches in the left shoulder which she has a tens unit and recent injection of steroids by her primary care.  This is distinctly in the muscle she says and not in her bones.2/13/2024 cervical spine x-ray has C7 obscured by overlapping bone and soft tissues but otherwise unremarkable. Recent CBC and CMP had normal bone enzymes and was otherwise unremarkable.  Hence I would not make much out of the neck pain in the way of concern for metastatic disease unless this worsens.   She is under a lot of psychological strain from this diagnosis and is quite anxious and I will refer her to Ashlee Nayak are oncology psychiatrist and we will get our breast cancer navigator working with her.  The 7 mm right breast tumor is grade 2  % 3+, WA 50% 3+, HER2/nabila 0+.  The left breast is clinically 6 mm grade 3 invasive ductal ER negative WA weakly positive HER2/nabila 1 negative essentially triple negative.  We will get MRI of her breasts and if the lesion in the left breast turns out to be larger than 1 cm mammographically or is node positive then we would give neoadjuvant therapy.  If not, both for the 7 mm right breast and the 8 mm left breast tumor I would consider primary resection.  She has just started a job with [employer] in [place] and her insurance changes to [facility] in 2 weeks and she wants her surgery done in the [facility] facility for insurance reasons.  I will discuss this  with Dr. Ashton (he was scrubbed when I called today) whose skills I recommended to her but we will make appropriate referrals per her request.  With her triple negativity she will also get genetic counseling.  Absent any other somatic complaints and assuming her neck continues to improve I would do no additional staging imaging in the absence of such symptoms.  We will also present her at our multidisciplinary tumor board once the MRI is completed for final decision making.She is committed to bilateral mammograms regardless of the results of the MRI or genetic testing.  Genetic testing is done on all triple negatives.She does have a history of gastric sleeve but still needs to lose weight.  She also carries a diagnosis of COPD and I am not sure who has managed that and Dr. QUAN may need her cleared by primary care/pulmonary before surgery but I will leave it to him.  She is not oxygen requiring.    -2/21/2024 bilateral breast MRI:  Right breast 12:00, 8 cm from nipple, 1.4 cm enhancing mass consistent with biopsy and no additional right breast lesions.  Left breast 2:00 5 cm from nipple is 8 mm masslike enhancement with central signal void artifact corresponding to biopsy site.  No additional suspicious sites in the left breast.  No internal mammary nor axillary salazar involvement on either side    -3/1/2024 [facility] medical oncology follow-up: We reviewed her MRI at multidisciplinary tumor board.  For the right breast cancer she would have surgery primarily followed by adjuvant hormone blockade but would not do Oncotype as, for the left breast for which we plan surgery upfront for the 8 mm size of tumor triple negative she will need chemo regardless.  If the left breast lesion ends up less than 1 cm node-negative then we would give her Taxotere Cytoxan x 4.    If the left breast lesion is over a centimeter but less than 2 cm, then I would add Adriamycin.  If the left breast lesion is over 2 cm pathologically or node  positive, then I would add carboplatin and Keytruda.  For the right breast cancer I would give her at least 5 and, if she is tolerating, 10 years of hormone blockade.  She had gone 1 year without menses but just started spotting.  I contacted Dr. Ramirze who will see her in [facility] office today for pelvic exam and to get hormone levels and to get ultrasound scheduled.  She has COPD without pulmonary function since prior to her surgery for bariatric surgery in 2019.  She smoked until November.  She is not requiring oxygen and uses her inhalers efficiently without major symptoms.  I spoke with Dr. QUAN and he is comfortable with proceeding without pulmonary function tests and he will get preoperative ABG.  I will see her back in about a month which will give her time to hopefully have healed from her surgery and we can then make plans for adjuvant therapy based upon the above algorithm.  She will need a port but Dr. QUAN does not want to place this during the time of surgery but he will make arrangements to do that postoperatively.    -3/21/2024 evacuation left mastectomy hematomaWith bilateral skin sparing mastectomies and bilateral sentinel node injection and biopsies.  Left breast mastectomy pathology shows no residual carcinoma and 1 benign sentinel node.  Pathologic T1 a N0 FL weakly positive essentially triple negative  Right breast mastectomy pathology shows 1.1 cm grade 3 invasive ductal carcinoma, 2 benign sentinel nodes pathologic T1c N0 ER/FL positive HER2/nabila negative.  Oncotype score 44 distant recurrence risk at 9 years 32%.  Greater than 15% absolute benefit from chemotherapy.    -4/2/2024 [facility] medical oncology follow-up: I reviewed the above bilateral mastectomy pathology reports with her.  Wounds are healing well though still with significant healing yet to do and still has drains in place and is due to see Dr. QUAN tomorrow.  Her left breast had no residual tumor and no lymph node involvement for  a pathological T1a N0 weakly KS positive essentially triple negative breast cancer for which I have placed orders for Taxotere Cytoxan x 4.  For her right breast mastectomy which is healing well, she had a 1.1 cm grade 3 invasive ductal carcinoma with 2 benign sentinel nodes pathological T1c N0 ER and KS positive HER2/nabila negative for which I will treat her with Arimidex x 10 years following her chemotherapy.  We will get her to Dr. QUAN for port placement and she will have chemo preparation visit in our [place] office and we will start treatment in 2 weeks assuming Dr. QUAN has cleared her surgically.  No need for radiation given bilateral mastectomies.  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    -4/9/2024 chemotherapy education preparation and needs assessment completed    -4/12/2024 Genetic testing was negative for pathogenic mutations in BRCA1/2 and 46 additional genes on the Common Hereditary Cancers panel.      -5/2/2024 treatment began with TC for a planned 4 courses    -5/2/2024 [facility] oncology clinic follow-up: Ashlee is ready to begin adjuvant therapy with TC today for a planned 4 courses.  Her drains have been removed, she still has sutures in place but her mastectomy scars appear well-healed.  I reviewed labs from yesterday, counts acceptable to continue treatment today.  She is taking her dexamethasone that she started yesterday pretreatment for 3 days.  Once she completes chemotherapy we will plan on Arimidex for 10 years.  Her genetic testing was negative.     - 6/14/2024 [facility] medical oncology telemedicine follow-up: She received cycle 1 TC on 5/2/2024.  13 minutes into her docetaxel, she reported severe bilateral low back and hip pain flushing and diaphoresis with mild abdominal discomfort and anxiousness.  Reaction medications administered.  Docetaxel restarted half rate and then increase to original rate and the remainder of the infusion well-tolerated.   She went to the emergency room 5/10/2024 with nausea vomiting and weakness lasting the previous 3 days concerned about dehydration.  5/21/2024 hemoglobin 8.3 platelets 844,000 with MCV 79.6 otherwise normal CBC and differential.  Potassium 5.3 normal creatinine otherwise unremarkable CMP.  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  As she has a very high recurrence score, I would try the Taxotere again with institution of all the rescue meds upfront and 0.5 mg IV Ativan premed.  We will do this next week in our [place] office and if she tolerates that we will see her back 3 weeks later for cycle 3 of 4.      -6/21/2024 note: Called to infusion.  Patient had had all the rescue occasions given upfront and despite that had terrible back pain.  Not as excruciating as the first cycle but still too much to handle.  No wheezing or rashes but nonetheless concerning for potential infusion allergic reaction.  In the literature looking at docetaxel after paclitaxel infusion reactions, there was a 50% chance of reaction suggesting that it is the taxane moiety and not just the vehicle causing the reaction in all cases.  Hence, I am hesitant to use Taxol in place of Taxotere.  To that end, I will get her ejection fraction and assuming it is normal we will try 6 cycles of CAF.  Prozac has a category X potential increase of Adriamycin levels.  She is prescribed this by her primary care.  I will discontinue and substitute Effexor XR 75 mg daily which does not have this interaction.  Total time attending to patient and changing these plans and putting in new prescriptions took 1 hour patient care time today        -6/26/2024 echocardiogram ejection fraction 51-55%.  GLS -22%  CAF PEG filgrastim Pharm.D. education [place]  [place]    -6/28/2024 CAF cycle 1/6    -7/19/2024 patient no showed         Malignant neoplasm of overlapping sites of both breasts in female, estrogen receptor positive   5/1/2024 -  Chemotherapy    OP CENTRAL VENOUS ACCESS DEVICE Access, Care, and Maintenance (CVAD)     5/2/2024 - 6/21/2024 Chemotherapy    OP BREAST TC DOCEtaxel / Cyclophosphamide     6/21/2024 -  Chemotherapy    OP CVAD Occlusion - Alteplase     6/28/2024 -  Chemotherapy    OP BREAST FAC DOXOrubicin / Cyclophosphamide / Fluorouracil     Malignant neoplasm of overlapping sites of right female breast   3/21/2024 Initial Diagnosis    Malignant neoplasm of overlapping sites of right female breast     5/2/2024 - 6/21/2024 Chemotherapy    OP BREAST TC DOCEtaxel / Cyclophosphamide     6/28/2024 -  Chemotherapy    OP BREAST FAC DOXOrubicin / Cyclophosphamide / Fluorouracil         HISTORY OF PRESENT ILLNESS:  The patient is a 48 y.o. female, here for follow up on management of adjuvant therapy breast cancer    Past Medical History:   Diagnosis Date    Asthma     Breast cancer     COPD (chronic obstructive pulmonary disease)     Depression     history     Past Surgical History:   Procedure Laterality Date    ENDOSCOPY      EGD x2    GASTRECTOMY      SLEEVE    HEMATOMA EVACUATION TRUNK Bilateral 3/21/2024    Procedure: HEMATOMA EVACUATION TRUNK;  Surgeon: Phuong Loredo MD;  Location: [facility] OR;  Service: General;  Laterality: Bilateral;    MASTECTOMY W/ SENTINEL NODE BIOPSY Bilateral 3/21/2024    Procedure: BREAST MASTECTOMY WITH SENTINEL NODE BIOPSY BILATERAL;  Surgeon: Phuong Loredo MD;  Location: [facility] OR;  Service: General;  Laterality: Bilateral;       No Known Allergies    Family History and Social History reviewed and changed as necessary    REVIEW OF SYSTEM:   No somatic complaints    PHYSICAL EXAM:  No jaundice icterus or pallor.  No respiratory distress.  No rashes.    Vitals:    07/25/24 0805   BP: 152/89   Pulse: 98   Resp: 18  "  Temp: 97.8 °F (36.6 °C)   SpO2: 95%   Weight: 88.9 kg (196 lb)   Height: 161.3 cm (63.5\")     Vitals:    07/25/24 0805   PainSc: 0-No pain          ECOG score: 0           Vitals reviewed.    ECOG: (0) Fully Active - Able to Carry On All Pre-disease Performance Without Restriction    Lab Results   Component Value Date    HGB 10.2 (L) 07/23/2024    HCT 35.3 07/23/2024    MCV 80 07/23/2024     (H) 07/23/2024    WBC 7.0 07/23/2024    NEUTROABS 4.7 07/23/2024    LYMPHSABS 1.7 07/23/2024    MONOSABS 0.6 07/23/2024    EOSABS 0.0 07/23/2024    BASOSABS 0.1 07/23/2024       Lab Results   Component Value Date    GLUCOSE 96 07/23/2024    BUN 11 07/23/2024    CREATININE 0.83 07/23/2024     07/23/2024    K 4.4 07/23/2024     07/23/2024    CO2 24 07/23/2024    CALCIUM 9.3 07/23/2024    PROTEINTOT 6.6 06/21/2024    ALBUMIN 4.0 07/23/2024    BILITOT 0.3 07/23/2024    ALKPHOS 98 07/23/2024    AST 14 07/23/2024    ALT 7 07/23/2024             ASSESSMENT & PLAN:  1.  Bilateral breast cancer:  -Right side invasive ductal clinical T1c N0, 1.4 centimeter, ER positive NV positive HER2 negative, grade 2.  Pathologic 1.1 cm T1c N0 on mastectomy.  Recurrence score 44.  Distant recurrence risk on hormone blockade alone 32%. > 15% absolute chemotherapeutic benefit.  -Left side invasive ductal clinical T1b N0, 8 MM, ER negative, NV weakly positive, HER2/nabila negative, essentially triple negative grade 3.  Pathologic T1 a N0 with no residual tumor on mastectomy  Initial bilateral mastectomy recommended.    - Negative genetic testing.  -Received 2 cycles of TC and despite heavy premedication could not tolerate with reaction in the chair.  Hence, switch to CAF x 6 on 6/28/2024.  2.  COPD    Oncology history timeline:  -1/11/2024 bilateral screening mammogram [facility] [place] read [facility] imaging showed asymmetry right breast additional views needed with mass in left breast " requiring additional imaging  -2/1/2024 bilateral diagnostic mammogram [place] [facility] showed persistent focal asymmetry right 12:00 7 cm from the nipple 7 mm hypoechoic mass indistinct with irregular margins ultrasound-guided biopsy recommended.  Left breast showed 8 mm 3:00 oval mass 10 cm from the nipple that on the ultrasound was 6 x 5 x 5 mm.  Incidental 4 mm 2:00 hypoechoic mass with internal vascularity 5 cm from the nipple for which ultrasound-guided biopsy recommended  -2/5/2024 right ultrasound-guided core biopsy 7 mm mass with HydroMARK T4 shaped tissue marker placed at the biopsy site.  Left ultrasound breast core biopsy 3:00 6 mm mass and 2:00 4 mm mass with HydroMARK T3 and T4 respectively.  Right breast 12:00 lesion grade 2 invasive ductal Jimenez-Cui 6 out of 9, 4 mm.  % 3+, AK 50% 3+.  Also intermediate grade ductal carcinoma in situ  Left breast 3:00 invasive ductal carcinoma grade 3 Bloom-Cui 9 out of 9, 5-6 mm, ER 0%, AK 25% 1+, HER2/nabila negative 1+  Left breast 2:00 core biopsy fibroadenoma  -2/12/2024 office note Dr. Gregorio Ashton indicates patient with newly diagnosed bilateral breast cancer.  Screening mammogram showed 2 abnormalities in the left breast and 1 in the right.    No personal or family history of breast cancer.  Has hot flashes but not taking estrogen supplements.  -2/13/2024 cervical spine x-ray negative    -2/15/2024 [facility] medical oncology follow-up: Patient has bilateral breast cancers as outlined on routine screening mammogram without any other symptoms.  After her diagnosis, she has had muscle tension aches in the left shoulder which she has a tens unit and recent injection of steroids by her primary care.  This is distinctly in the muscle she says and not in her bones.2/13/2024 cervical spine x-ray has C7 obscured by overlapping bone and soft tissues but otherwise unremarkable. Recent CBC and CMP had normal bone enzymes and was otherwise unremarkable.   Hence I would not make much out of the neck pain in the way of concern for metastatic disease unless this worsens.   She is under a lot of psychological strain from this diagnosis and is quite anxious and I will refer her to Ashlee Nayak are oncology psychiatrist and we will get our breast cancer navigator working with her.  The 7 mm right breast tumor is grade 2  % 3+, OK 50% 3+, HER2/nabila 0+.  The left breast is clinically 6 mm grade 3 invasive ductal ER negative OK weakly positive HER2/nabila 1 negative essentially triple negative.  We will get MRI of her breasts and if the lesion in the left breast turns out to be larger than 1 cm mammographically or is node positive then we would give neoadjuvant therapy.  If not, both for the 7 mm right breast and the 8 mm left breast tumor I would consider primary resection.  She has just started a job with [employer] in [place] and her insurance changes to [employer] in 2 weeks and she wants her surgery done in the [facility] facility for insurance reasons.  I will discuss this with Dr. Ashton (he was scrubbed when I called today) whose skills I recommended to her but we will make appropriate referrals per her request.  With her triple negativity she will also get genetic counseling.  Absent any other somatic complaints and assuming her neck continues to improve I would do no additional staging imaging in the absence of such symptoms.  We will also present her at our multidisciplinary tumor board once the MRI is completed for final decision making.She is committed to bilateral mammograms regardless of the results of the MRI or genetic testing.  Genetic testing is done on all triple negatives.She does have a history of gastric sleeve but still needs to lose weight.  She also carries a diagnosis of COPD and I am not sure who has managed that and Dr. QUAN may need her cleared by primary care/pulmonary before surgery but I will leave it to him.  She is not oxygen  requiring.    -2/21/2024 bilateral breast MRI:  Right breast 12:00, 8 cm from nipple, 1.4 cm enhancing mass consistent with biopsy and no additional right breast lesions.  Left breast 2:00 5 cm from nipple is 8 mm masslike enhancement with central signal void artifact corresponding to biopsy site.  No additional suspicious sites in the left breast.  No internal mammary nor axillary salazar involvement on either side    -3/1/2024 [facility] oncology follow-up: We reviewed her MRI at multidisciplinary tumor board.  For the right breast cancer she would have surgery primarily followed by adjuvant hormone blockade but would not do Oncotype as, for the left breast for which we plan surgery upfront for the 8 mm size of tumor triple negative she will need chemo regardless.  If the left breast lesion ends up less than 1 cm node-negative then we would give her Taxotere Cytoxan x 4.    If the left breast lesion is over a centimeter but less than 2 cm, then I would add Adriamycin.  If the left breast lesion is over 2 cm pathologically or node positive, then I would add carboplatin and Keytruda.  For the right breast cancer I would give her at least 5 and, if she is tolerating, 10 years of hormone blockade.  She had gone 1 year without menses but just started spotting.  I contacted Dr. Ramirez who will see her in [facility] office today for pelvic exam and to get hormone levels and to get ultrasound scheduled.  She has COPD without pulmonary function since prior to her surgery for bariatric surgery in 2019.  She smoked until November.  She is not requiring oxygen and uses her inhalers efficiently without major symptoms.  I spoke with Dr. QUAN and he is comfortable with proceeding without pulmonary function tests and he will get preoperative ABG.  I will see her back in about a month which will give her time to hopefully have healed from her surgery and we can then make plans for adjuvant therapy based upon the above  algorithm.  She will need a port but Dr. QUAN does not want to place this during the time of surgery but he will make arrangements to do that postoperatively.    -3/21/2024 evacuation left mastectomy hematomaWith bilateral skin sparing mastectomies and bilateral sentinel node injection and biopsies.  Left breast mastectomy pathology shows no residual carcinoma and 1 benign sentinel node.  Pathologic T1 a N0 IA weakly positive essentially triple negative  Right breast mastectomy pathology shows 1.1 cm grade 3 invasive ductal carcinoma, 2 benign sentinel nodes pathologic T1c N0 ER/IA positive HER2/nabila negative.  Oncotype score 44 distant recurrence risk at 9 years 32%.  Greater than 15% absolute benefit from chemotherapy.    -4/2/2024 [facility] medical oncology follow-up: I reviewed the above bilateral mastectomy pathology reports with her.  Wounds are healing well though still with significant healing yet to do and still has drains in place and is due to see Dr. QUAN tomorrow.  Her left breast had no residual tumor and no lymph node involvement for a pathological T1a N0 weakly IA positive essentially triple negative breast cancer for which I have placed orders for Taxotere Cytoxan x 4.  For her right breast mastectomy which is healing well, she had a 1.1 cm grade 3 invasive ductal carcinoma with 2 benign sentinel nodes pathological T1c N0 ER and IA positive HER2/nabila negative for which I will treat her with Arimidex x 10 years following her chemotherapy.  We will get her to Dr. QUAN for port placement and she will have chemo preparation visit in our [place] office and we will start treatment in 2 weeks assuming Dr. QUAN has cleared her surgically.  No need for radiation given bilateral mastectomies.  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    -4/9/2024 chemotherapy education preparation and needs assessment completed    -4/12/2024 Genetic testing was negative for pathogenic  mutations in BRCA1/2 and 46 additional genes on the Common Hereditary Cancers panel.      -5/2/2024 treatment began with TC for a planned 4 courses    -5/2/2024 [facility] oncology clinic follow-up: Ashlee is ready to begin adjuvant therapy with TC today for a planned 4 courses.  Her drains have been removed, she still has sutures in place but her mastectomy scars appear well-healed.  I reviewed labs from yesterday, counts acceptable to continue treatment today.  She is taking her dexamethasone that she started yesterday pretreatment for 3 days.  Once she completes chemotherapy we will plan on Arimidex for 10 years.  Her genetic testing was negative.     - 6/14/2024 [facility] medical oncology telemedicine follow-up: She received cycle 1 TC on 5/2/2024.  13 minutes into her docetaxel, she reported severe bilateral low back and hip pain flushing and diaphoresis with mild abdominal discomfort and anxiousness.  Reaction medications administered.  Docetaxel restarted half rate and then increase to original rate and the remainder of the infusion well-tolerated.  She went to the emergency room 5/10/2024 with nausea vomiting and weakness lasting the previous 3 days concerned about dehydration.  5/21/2024 hemoglobin 8.3 platelets 844,000 with MCV 79.6 otherwise normal CBC and differential.  Potassium 5.3 normal creatinine otherwise unremarkable CMP.  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  As she has a very high recurrence score, I would try the Taxotere again with institution of all the rescue meds upfront and 0.5 mg IV Ativan premed.  We will do this next week in our [place] office and if she tolerates that we will see her back 3 weeks later for cycle 3 of 4.      -6/21/2024 note: Called to infusion.  Patient had had all the  rescue occasions given upfront and despite that had terrible back pain.  Not as excruciating as the first cycle but still too much to handle.  No wheezing or rashes but nonetheless concerning for potential infusion allergic reaction.  In the literature looking at docetaxel after paclitaxel infusion reactions, there was a 50% chance of reaction suggesting that it is the taxane moiety and not just the vehicle causing the reaction in all cases.  Hence, I am hesitant to use Taxol in place of Taxotere.  To that end, I will get her ejection fraction and assuming it is normal we will try 6 cycles of CAF.  Prozac has a category X potential increase of Adriamycin levels.  She is prescribed this by her primary care.  I will discontinue and substitute Effexor XR 75 mg daily which does not have this interaction.          -6/26/2024 echocardiogram ejection fraction 51-55%.  GLS -22%  CAF PEG filgrastim Pharm.D. education Debo Moran    -6/28/2024 CAF cycle 1/6    -7/25/2024 [facility] oncology follow-up: Tolerated cycle 1 of CAF with no side effects.  Now on Effexor instead of Prozac for the category X interaction with Prozac with no problems and feeling fairly fit other than some fatigue.  Continue 2 out of 6 CAF with follow-up 8/9/2024.  See above for subsequent plans    Total time of care today inclusive of time spent today prior to patient's arrival reviewing interval records including echocardiogram and chemotherapy orders and experience thereof and during visit interviewing her as to signs or symptoms of her disease and management thereof and after visit instituting this plan took 47 minutes of patient care time throughout the day today  Corky Youngblood MD    07/25/2024

## 2024-07-26 RX ORDER — BUDESONIDE AND FORMOTEROL FUMARATE DIHYDRATE 80; 4.5 UG/1; UG/1
2 AEROSOL RESPIRATORY (INHALATION)
Qty: 10.2 G | Refills: 12 | Status: SHIPPED | OUTPATIENT
Start: 2024-07-26

## 2024-07-26 RX ORDER — BUDESONIDE AND FORMOTEROL FUMARATE DIHYDRATE 80; 4.5 UG/1; UG/1
2 AEROSOL RESPIRATORY (INHALATION) 2 TIMES DAILY
Qty: 10.2 G | Refills: 12 | OUTPATIENT
Start: 2024-07-26

## 2024-07-29 RX ORDER — PLECANATIDE 3 MG/1
1 TABLET ORAL DAILY
Qty: 90 TABLET | Refills: 3 | Status: SHIPPED | OUTPATIENT
Start: 2024-07-29

## 2024-08-13 RX ORDER — OMEPRAZOLE 40 MG/1
40 CAPSULE, DELAYED RELEASE ORAL 2 TIMES DAILY
Qty: 180 CAPSULE | Refills: 3 | Status: SHIPPED | OUTPATIENT
Start: 2024-08-13

## 2024-08-14 ENCOUNTER — LAB (OUTPATIENT)
Dept: FAMILY MEDICINE CLINIC | Facility: CLINIC | Age: 49
End: 2024-08-14
Payer: COMMERCIAL

## 2024-08-15 LAB
ALBUMIN SERPL-MCNC: 4.1 G/DL (ref 3.8–4.9)
ALP SERPL-CCNC: 107 IU/L (ref 44–121)
ALT SERPL-CCNC: 11 IU/L (ref 0–32)
AST SERPL-CCNC: 15 IU/L (ref 0–40)
BASOPHILS # BLD AUTO: 0.1 X10E3/UL (ref 0–0.2)
BASOPHILS NFR BLD AUTO: 1 %
BILIRUB SERPL-MCNC: 0.2 MG/DL (ref 0–1.2)
BUN SERPL-MCNC: 15 MG/DL (ref 6–24)
BUN/CREAT SERPL: 17 (ref 9–23)
CALCIUM SERPL-MCNC: 9.7 MG/DL (ref 8.7–10.2)
CHLORIDE SERPL-SCNC: 100 MMOL/L (ref 96–106)
CO2 SERPL-SCNC: 22 MMOL/L (ref 20–29)
CREAT SERPL-MCNC: 0.9 MG/DL (ref 0.57–1)
EGFRCR SERPLBLD CKD-EPI 2021: 77 ML/MIN/1.73
EOSINOPHIL # BLD AUTO: 0 X10E3/UL (ref 0–0.4)
EOSINOPHIL NFR BLD AUTO: 0 %
ERYTHROCYTE [DISTWIDTH] IN BLOOD BY AUTOMATED COUNT: 19.9 % (ref 11.7–15.4)
GLOBULIN SER CALC-MCNC: 2.4 G/DL (ref 1.5–4.5)
GLUCOSE SERPL-MCNC: 114 MG/DL (ref 70–99)
HCT VFR BLD AUTO: 38.3 % (ref 34–46.6)
HGB BLD-MCNC: 11 G/DL (ref 11.1–15.9)
IMM GRANULOCYTES # BLD AUTO: 0 X10E3/UL (ref 0–0.1)
IMM GRANULOCYTES NFR BLD AUTO: 0 %
LYMPHOCYTES # BLD AUTO: 1.5 X10E3/UL (ref 0.7–3.1)
LYMPHOCYTES NFR BLD AUTO: 19 %
MCH RBC QN AUTO: 24.1 PG (ref 26.6–33)
MCHC RBC AUTO-ENTMCNC: 28.7 G/DL (ref 31.5–35.7)
MCV RBC AUTO: 84 FL (ref 79–97)
MONOCYTES # BLD AUTO: 0.6 X10E3/UL (ref 0.1–0.9)
MONOCYTES NFR BLD AUTO: 8 %
NEUTROPHILS # BLD AUTO: 5.4 X10E3/UL (ref 1.4–7)
NEUTROPHILS NFR BLD AUTO: 72 %
PLATELET # BLD AUTO: 716 X10E3/UL (ref 150–450)
POTASSIUM SERPL-SCNC: 5 MMOL/L (ref 3.5–5.2)
PROT SERPL-MCNC: 6.5 G/DL (ref 6–8.5)
RBC # BLD AUTO: 4.56 X10E6/UL (ref 3.77–5.28)
SODIUM SERPL-SCNC: 138 MMOL/L (ref 134–144)
WBC # BLD AUTO: 7.7 X10E3/UL (ref 3.4–10.8)

## 2024-08-16 ENCOUNTER — APPOINTMENT (OUTPATIENT)
Dept: ONCOLOGY | Facility: HOSPITAL | Age: 49
End: 2024-08-16
Payer: COMMERCIAL

## 2024-08-16 ENCOUNTER — OFFICE VISIT (OUTPATIENT)
Dept: ONCOLOGY | Facility: CLINIC | Age: 49
End: 2024-08-16
Payer: COMMERCIAL

## 2024-08-16 ENCOUNTER — HOSPITAL ENCOUNTER (OUTPATIENT)
Dept: ONCOLOGY | Facility: HOSPITAL | Age: 49
Discharge: HOME | End: 2024-08-16
Payer: COMMERCIAL

## 2024-08-16 VITALS
BODY MASS INDEX: 32.95 KG/M2 | OXYGEN SATURATION: 99 % | SYSTOLIC BLOOD PRESSURE: 152 MMHG | HEART RATE: 107 BPM | WEIGHT: 193 LBS | RESPIRATION RATE: 18 BRPM | DIASTOLIC BLOOD PRESSURE: 93 MMHG | TEMPERATURE: 96.5 F | HEIGHT: 64 IN

## 2024-08-16 VITALS — TEMPERATURE: 96.6 F | HEART RATE: 64 BPM | SYSTOLIC BLOOD PRESSURE: 120 MMHG | DIASTOLIC BLOOD PRESSURE: 60 MMHG

## 2024-08-16 DIAGNOSIS — C50.811 MALIGNANT NEOPLASM OF OVERLAPPING SITES OF BOTH BREASTS IN FEMALE, ESTROGEN RECEPTOR POSITIVE: Primary | ICD-10-CM

## 2024-08-16 DIAGNOSIS — Z17.1 MALIGNANT NEOPLASM OF OVERLAPPING SITES OF RIGHT BREAST IN FEMALE, ESTROGEN RECEPTOR NEGATIVE: ICD-10-CM

## 2024-08-16 DIAGNOSIS — Z17.0 MALIGNANT NEOPLASM OF OVERLAPPING SITES OF BOTH BREASTS IN FEMALE, ESTROGEN RECEPTOR POSITIVE: Primary | ICD-10-CM

## 2024-08-16 DIAGNOSIS — C50.811 MALIGNANT NEOPLASM OF OVERLAPPING SITES OF RIGHT BREAST IN FEMALE, ESTROGEN RECEPTOR NEGATIVE: ICD-10-CM

## 2024-08-16 DIAGNOSIS — C50.812 MALIGNANT NEOPLASM OF OVERLAPPING SITES OF BOTH BREASTS IN FEMALE, ESTROGEN RECEPTOR POSITIVE: Primary | ICD-10-CM

## 2024-08-16 PROCEDURE — 25010000002 CYCLOPHOSPHAMIDE 2 GM/10ML SOLUTION 10 ML VIAL: Performed by: NURSE PRACTITIONER

## 2024-08-16 PROCEDURE — 96411 CHEMO IV PUSH ADDL DRUG: CPT

## 2024-08-16 PROCEDURE — 25010000002 PALONOSETRON PER 25 MCG: Performed by: NURSE PRACTITIONER

## 2024-08-16 PROCEDURE — 25010000002 FLUOROURACIL PER 500 MG: Performed by: NURSE PRACTITIONER

## 2024-08-16 PROCEDURE — 25010000002 HEPARIN LOCK FLUSH PER 10 UNITS: Performed by: INTERNAL MEDICINE

## 2024-08-16 PROCEDURE — 25010000002 DOXORUBICIN PER 10 MG: Performed by: NURSE PRACTITIONER

## 2024-08-16 PROCEDURE — 96413 CHEMO IV INFUSION 1 HR: CPT

## 2024-08-16 PROCEDURE — 99214 OFFICE O/P EST MOD 30 MIN: CPT | Performed by: NURSE PRACTITIONER

## 2024-08-16 PROCEDURE — 25010000002 PEGFILGRASTIM 6 MG/0.6ML PREFILLED SYRINGE KIT: Performed by: NURSE PRACTITIONER

## 2024-08-16 PROCEDURE — 25010000002 FOSAPREPITANT PER 1 MG: Performed by: NURSE PRACTITIONER

## 2024-08-16 PROCEDURE — 25010000002 DEXAMETHASONE SODIUM PHOSPHATE 100 MG/10ML SOLUTION: Performed by: NURSE PRACTITIONER

## 2024-08-16 PROCEDURE — 25810000003 SODIUM CHLORIDE 0.9 % SOLUTION: Performed by: NURSE PRACTITIONER

## 2024-08-16 PROCEDURE — 25810000003 SODIUM CHLORIDE 0.9 % SOLUTION 250 ML FLEX CONT: Performed by: NURSE PRACTITIONER

## 2024-08-16 PROCEDURE — 96375 TX/PRO/DX INJ NEW DRUG ADDON: CPT

## 2024-08-16 PROCEDURE — 96377 APPLICATON ON-BODY INJECTOR: CPT

## 2024-08-16 PROCEDURE — 96367 TX/PROPH/DG ADDL SEQ IV INF: CPT

## 2024-08-16 RX ORDER — DOXORUBICIN HYDROCHLORIDE 2 MG/ML
50 INJECTION, SOLUTION INTRAVENOUS ONCE
Status: COMPLETED | OUTPATIENT
Start: 2024-08-16 | End: 2024-08-16

## 2024-08-16 RX ORDER — PALONOSETRON 0.05 MG/ML
0.25 INJECTION, SOLUTION INTRAVENOUS ONCE
Status: COMPLETED | OUTPATIENT
Start: 2024-08-16 | End: 2024-08-16

## 2024-08-16 RX ORDER — SODIUM CHLORIDE 9 MG/ML
20 INJECTION, SOLUTION INTRAVENOUS ONCE
Status: COMPLETED | OUTPATIENT
Start: 2024-08-16 | End: 2024-08-16

## 2024-08-16 RX ORDER — HEPARIN SODIUM (PORCINE) LOCK FLUSH IV SOLN 100 UNIT/ML 100 UNIT/ML
500 SOLUTION INTRAVENOUS AS NEEDED
OUTPATIENT
Start: 2024-08-16

## 2024-08-16 RX ORDER — SODIUM CHLORIDE 9 MG/ML
20 INJECTION, SOLUTION INTRAVENOUS ONCE
Status: CANCELLED | OUTPATIENT
Start: 2024-08-16

## 2024-08-16 RX ORDER — SODIUM CHLORIDE 0.9 % (FLUSH) 0.9 %
20 SYRINGE (ML) INJECTION AS NEEDED
OUTPATIENT
Start: 2024-08-16

## 2024-08-16 RX ORDER — DOXORUBICIN HYDROCHLORIDE 2 MG/ML
50 INJECTION, SOLUTION INTRAVENOUS ONCE
Status: CANCELLED | OUTPATIENT
Start: 2024-08-16

## 2024-08-16 RX ORDER — FLUOROURACIL 50 MG/ML
1000 INJECTION, SOLUTION INTRAVENOUS ONCE
Status: COMPLETED | OUTPATIENT
Start: 2024-08-16 | End: 2024-08-16

## 2024-08-16 RX ORDER — PALONOSETRON 0.05 MG/ML
0.25 INJECTION, SOLUTION INTRAVENOUS ONCE
Status: CANCELLED | OUTPATIENT
Start: 2024-08-16

## 2024-08-16 RX ORDER — FLUOROURACIL 50 MG/ML
500 INJECTION, SOLUTION INTRAVENOUS ONCE
Status: CANCELLED | OUTPATIENT
Start: 2024-08-16

## 2024-08-16 RX ORDER — HEPARIN SODIUM (PORCINE) LOCK FLUSH IV SOLN 100 UNIT/ML 100 UNIT/ML
500 SOLUTION INTRAVENOUS AS NEEDED
Status: DISCONTINUED | OUTPATIENT
Start: 2024-08-16 | End: 2024-08-17 | Stop reason: HOSPADM

## 2024-08-16 RX ADMIN — FOSAPREPITANT DIMEGLUMINE 150 MG: 150 INJECTION, POWDER, LYOPHILIZED, FOR SOLUTION INTRAVENOUS at 10:00

## 2024-08-16 RX ADMIN — HEPARIN 500 UNITS: 100 SYRINGE at 12:03

## 2024-08-16 RX ADMIN — CYCLOPHOSPHAMIDE 960 MG: 2 INJECTION INTRAVENOUS at 11:11

## 2024-08-16 RX ADMIN — FLUOROURACIL 1000 MG: 50 INJECTION, SOLUTION INTRAVENOUS at 11:58

## 2024-08-16 RX ADMIN — PALONOSETRON HYDROCHLORIDE 0.25 MG: 0.25 INJECTION INTRAVENOUS at 09:56

## 2024-08-16 RX ADMIN — DEXAMETHASONE SODIUM PHOSPHATE 12 MG: 10 INJECTION, SOLUTION INTRAMUSCULAR; INTRAVENOUS at 10:00

## 2024-08-16 RX ADMIN — SODIUM CHLORIDE 20 ML/HR: 9 INJECTION, SOLUTION INTRAVENOUS at 09:55

## 2024-08-16 RX ADMIN — PEGFILGRASTIM 6 MG: KIT SUBCUTANEOUS at 12:05

## 2024-08-16 RX ADMIN — DOXORUBICIN HYDROCHLORIDE 96 MG: 2 INJECTION, SOLUTION INTRAVENOUS at 11:00

## 2024-08-16 NOTE — PROGRESS NOTES
CHIEF COMPLAINT:  Dry skin on hands    Problem List:  Oncology/Hematology History Overview Note   1.  Bilateral breast cancer:  -Right side invasive ductal clinical T1c N0, 1.4 centimeter, ER positive OK positive HER2 negative, grade 2.  Pathologic 1.1 cm T1c N0 on mastectomy.  Recurrence score 44.  Distant recurrence risk on hormone blockade alone 32%. > 15% absolute chemotherapeutic benefit.  -Left side invasive ductal clinical T1b N0, 8 MM, ER negative, OK weakly positive, HER2/nabila negative, essentially triple negative grade 3.  Pathologic T1 a N0 with no residual tumor on mastectomy  Initial bilateral mastectomy recommended.    - Negative genetic testing.  -Received 2 cycles of TC and despite heavy premedication could not tolerate with reaction in the chair.  Hence, switch to CAF x 6 on 6/28/2024.  2.  COPD    Oncology history timeline:  -1/11/2024 bilateral screening mammogram [facility] imaging showed asymmetry right breast additional views needed with mass in left breast requiring additional imaging  -2/1/2024 bilateral diagnostic mammogram [facility] showed persistent focal asymmetry right 12:00 7 cm from the nipple 7 mm hypoechoic mass indistinct with irregular margins ultrasound-guided biopsy recommended.  Left breast showed 8 mm 3:00 oval mass 10 cm from the nipple that on the ultrasound was 6 x 5 x 5 mm.  Incidental 4 mm 2:00 hypoechoic mass with internal vascularity 5 cm from the nipple for which ultrasound-guided biopsy recommended  -2/5/2024 right ultrasound-guided core biopsy 7 mm mass with HydroMARK T4 shaped tissue marker placed at the biopsy site.  Left ultrasound breast core biopsy 3:00 6 mm mass and 2:00 4 mm mass with HydroMARK T3 and T4 respectively.  Right breast 12:00 lesion grade 2 invasive ductal Jimenez-Cui 6 out of 9, 4 mm.  % 3+, OK 50% 3+.  Also intermediate grade ductal carcinoma in situ  Left  breast 3:00 invasive ductal carcinoma grade 3 Bloom-Cui 9 out of 9, 5-6 mm, ER 0%, DE 25% 1+, HER2/nabila negative 1+  Left breast 2:00 core biopsy fibroadenoma  -2/12/2024 office note Dr. Gregorio Ashton indicates patient with newly diagnosed bilateral breast cancer.  Screening mammogram showed 2 abnormalities in the left breast and 1 in the right.    No personal or family history of breast cancer.  Has hot flashes but not taking estrogen supplements.  -2/13/2024 cervical spine x-ray negative    -2/15/2024 [facility] medical oncology follow-up: Patient has bilateral breast cancers as outlined on routine screening mammogram without any other symptoms.  After her diagnosis, she has had muscle tension aches in the left shoulder which she has a tens unit and recent injection of steroids by her primary care.  This is distinctly in the muscle she says and not in her bones.2/13/2024 cervical spine x-ray has C7 obscured by overlapping bone and soft tissues but otherwise unremarkable. Recent CBC and CMP had normal bone enzymes and was otherwise unremarkable.  Hence I would not make much out of the neck pain in the way of concern for metastatic disease unless this worsens.   She is under a lot of psychological strain from this diagnosis and is quite anxious and I will refer her to Ashlee Nayak are oncology psychiatrist and we will get our breast cancer navigator working with her.  The 7 mm right breast tumor is grade 2  % 3+, DE 50% 3+, HER2/nabila 0+.  The left breast is clinically 6 mm grade 3 invasive ductal ER negative DE weakly positive HER2/nabila 1 negative essentially triple negative.  We will get MRI of her breasts and if the lesion in the left breast turns out to be larger than 1 cm mammographically or is node positive then we would give neoadjuvant therapy.  If not, both for the 7 mm right breast and the 8 mm left breast tumor I would consider primary resection.  She has just started a job with [employer] in [place]  and her insurance changes to [facility] in 2 weeks and she wants her surgery done in the [facility] facility for insurance reasons.  I will discuss this with Dr. Ashton (he was scrubbed when I called today) whose skills I recommended to her but we will make appropriate referrals per her request.  With her triple negativity she will also get genetic counseling.  Absent any other somatic complaints and assuming her neck continues to improve I would do no additional staging imaging in the absence of such symptoms.  We will also present her at our multidisciplinary tumor board once the MRI is completed for final decision making.She is committed to bilateral mammograms regardless of the results of the MRI or genetic testing.  Genetic testing is done on all triple negatives.She does have a history of gastric sleeve but still needs to lose weight.  She also carries a diagnosis of COPD and I am not sure who has managed that and Dr. QUAN may need her cleared by primary care/pulmonary before surgery but I will leave it to him.  She is not oxygen requiring.    -2/21/2024 bilateral breast MRI:  Right breast 12:00, 8 cm from nipple, 1.4 cm enhancing mass consistent with biopsy and no additional right breast lesions.  Left breast 2:00 5 cm from nipple is 8 mm masslike enhancement with central signal void artifact corresponding to biopsy site.  No additional suspicious sites in the left breast.  No internal mammary nor axillary salazar involvement on either side    -3/1/2024 [facility] medical oncology follow-up: We reviewed her MRI at multidisciplinary tumor board.  For the right breast cancer she would have surgery primarily followed by adjuvant hormone blockade but would not do Oncotype as, for the left breast for which we plan surgery upfront for the 8 mm size of tumor triple negative she will need chemo regardless.  If the left breast lesion ends up less than 1 cm node-negative then we would give her Taxotere Cytoxan x 4.    If the  left breast lesion is over a centimeter but less than 2 cm, then I would add Adriamycin.  If the left breast lesion is over 2 cm pathologically or node positive, then I would add carboplatin and Keytruda.  For the right breast cancer I would give her at least 5 and, if she is tolerating, 10 years of hormone blockade.  She had gone 1 year without menses but just started spotting.  I contacted Dr. Ramirez who will see her in [facility] office today for pelvic exam and to get hormone levels and to get ultrasound scheduled.  She has COPD without pulmonary function since prior to her surgery for bariatric surgery in 2019.  She smoked until November.  She is not requiring oxygen and uses her inhalers efficiently without major symptoms.  I spoke with Dr. QUAN and he is comfortable with proceeding without pulmonary function tests and he will get preoperative ABG.  I will see her back in about a month which will give her time to hopefully have healed from her surgery and we can then make plans for adjuvant therapy based upon the above algorithm.  She will need a port but Dr. QUAN does not want to place this during the time of surgery but he will make arrangements to do that postoperatively.    -3/21/2024 evacuation left mastectomy hematomaWith bilateral skin sparing mastectomies and bilateral sentinel node injection and biopsies.  Left breast mastectomy pathology shows no residual carcinoma and 1 benign sentinel node.  Pathologic T1 a N0 IA weakly positive essentially triple negative  Right breast mastectomy pathology shows 1.1 cm grade 3 invasive ductal carcinoma, 2 benign sentinel nodes pathologic T1c N0 ER/IA positive HER2/nabila negative.  Oncotype score 44 distant recurrence risk at 9 years 32%.  Greater than 15% absolute benefit from chemotherapy.    -4/2/2024 [facility] medical oncology follow-up: I reviewed the above bilateral mastectomy pathology reports with her.  Wounds are healing well though still with significant  healing yet to do and still has drains in place and is due to see Dr. QUAN tomorrow.  Her left breast had no residual tumor and no lymph node involvement for a pathological T1a N0 weakly OH positive essentially triple negative breast cancer for which I have placed orders for Taxotere Cytoxan x 4.  For her right breast mastectomy which is healing well, she had a 1.1 cm grade 3 invasive ductal carcinoma with 2 benign sentinel nodes pathological T1c N0 ER and OH positive HER2/nabila negative for which I will treat her with Arimidex x 10 years following her chemotherapy.  We will get her to Dr. QUAN for port placement and she will have chemo preparation visit in our [place] office and we will start treatment in 2 weeks assuming Dr. QUAN has cleared her surgically.  No need for radiation given bilateral mastectomies.  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    -4/9/2024 chemotherapy education preparation and needs assessment completed    -4/12/2024 Genetic testing was negative for pathogenic mutations in BRCA1/2 and 46 additional genes on the Common Hereditary Cancers panel.      -5/2/2024 treatment began with TC for a planned 4 courses    -5/2/2024 [facility] oncology clinic follow-up: Ashlee is ready to begin adjuvant therapy with TC today for a planned 4 courses.  Her drains have been removed, she still has sutures in place but her mastectomy scars appear well-healed.  I reviewed labs from yesterday, counts acceptable to continue treatment today.  She is taking her dexamethasone that she started yesterday pretreatment for 3 days.  Once she completes chemotherapy we will plan on Arimidex for 10 years.  Her genetic testing was negative.     - 6/14/2024 [facility] medical oncology telemedicine follow-up: She received cycle 1 TC on 5/2/2024.  13 minutes into her docetaxel, she reported severe bilateral low back and hip pain flushing and diaphoresis with mild abdominal discomfort and  anxiousness.  Reaction medications administered.  Docetaxel restarted half rate and then increase to original rate and the remainder of the infusion well-tolerated.  She went to the emergency room 5/10/2024 with nausea vomiting and weakness lasting the previous 3 days concerned about dehydration.  5/21/2024 hemoglobin 8.3 platelets 844,000 with MCV 79.6 otherwise normal CBC and differential.  Potassium 5.3 normal creatinine otherwise unremarkable CMP.  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  As she has a very high recurrence score, I would try the Taxotere again with institution of all the rescue meds upfront and 0.5 mg IV Ativan premed.  We will do this next week in our [place] office and if she tolerates that we will see her back 3 weeks later for cycle 3 of 4.      -6/21/2024 note: Called to infusion.  Patient had had all the rescue occasions given upfront and despite that had terrible back pain.  Not as excruciating as the first cycle but still too much to handle.  No wheezing or rashes but nonetheless concerning for potential infusion allergic reaction.  In the literature looking at docetaxel after paclitaxel infusion reactions, there was a 50% chance of reaction suggesting that it is the taxane moiety and not just the vehicle causing the reaction in all cases.  Hence, I am hesitant to use Taxol in place of Taxotere.  To that end, I will get her ejection fraction and assuming it is normal we will try 6 cycles of CAF.  Prozac has a category X potential increase of Adriamycin levels.  She is prescribed this by her primary care.  I will discontinue and substitute Effexor XR 75 mg daily which does not have this interaction.          -6/26/2024 echocardiogram ejection fraction 51-55%.  GLS -22%  CAF PEG filgrastim  Pharm.D. education Debo Moran    -6/28/2024 CAF cycle 1/6    -7/25/2024 [facility] medical oncology follow-up: Tolerated cycle 1 of CAF with no side effects.  Now on Effexor instead of Prozac for the category X interaction with Prozac with no problems and feeling fairly fit other than some fatigue.  Continue 2 out of 6 CAF with follow-up 8/9/2024.  See above for subsequent plans         Malignant neoplasm of overlapping sites of both breasts in female, estrogen receptor positive   5/1/2024 -  Chemotherapy    OP CENTRAL VENOUS ACCESS DEVICE Access, Care, and Maintenance (CVAD)     5/2/2024 - 6/21/2024 Chemotherapy    OP BREAST TC DOCEtaxel / Cyclophosphamide     6/21/2024 -  Chemotherapy    OP CVAD Occlusion - Alteplase     6/28/2024 -  Chemotherapy    OP BREAST FAC DOXOrubicin / Cyclophosphamide / Fluorouracil     Malignant neoplasm of overlapping sites of right female breast   3/21/2024 Initial Diagnosis    Malignant neoplasm of overlapping sites of right female breast     5/2/2024 - 6/21/2024 Chemotherapy    OP BREAST TC DOCEtaxel / Cyclophosphamide     6/28/2024 -  Chemotherapy    OP BREAST FAC DOXOrubicin / Cyclophosphamide / Fluorouracil         HISTORY OF PRESENT ILLNESS:  The patient is a 49 y.o. female, here for follow up on management of ER positive right breast cancer currently on adjuvant therapy with CAF after having reaction to TC.  Ashlee is doing well, she is tolerating treatment with CAF with no unusual side effects.  The skin on her hands is dry and she has 1 area where there is a fissure, she states that this is a chronic issue that she has had since childhood and not new.  She has had no unusual shortness of breath, no cough.  Appetite is stable.  Occasional constipation, no diarrhea.  No fevers or chills, no mouth sores.  She has been able to continue working.    Past Medical History:   Diagnosis Date    Asthma     Breast cancer     COPD (chronic obstructive pulmonary disease)     Depression      "history     Past Surgical History:   Procedure Laterality Date    ENDOSCOPY      EGD x2    GASTRECTOMY      SLEEVE    HEMATOMA EVACUATION TRUNK Bilateral 3/21/2024    Procedure: HEMATOMA EVACUATION TRUNK;  Surgeon: Phuong Loredo MD;  Location: [facility] OR;  Service: General;  Laterality: Bilateral;    MASTECTOMY W/ SENTINEL NODE BIOPSY Bilateral 3/21/2024    Procedure: BREAST MASTECTOMY WITH SENTINEL NODE BIOPSY BILATERAL;  Surgeon: Phuong Loredo MD;  Location: [facility] OR;  Service: General;  Laterality: Bilateral;       No Known Allergies    Family History and Social History reviewed and changed as necessary    REVIEW OF SYSTEM:   No new somatic complaints    PHYSICAL EXAM:  Well-developed, well-nourished appearing female in no distress  Heart regular rate and rhythm  Respirations regular and unlabored, lungs clear to auscultation bilaterally  Skin without rash, palmar surfaces of the hands is dry and flaky, right greater than left with scaly appearance on the right index finger    Vitals:    08/16/24 0858   BP: 152/93   Pulse: 107   Resp: 18   Temp: 96.5 °F (35.8 °C)   SpO2: 99%   Weight: 87.5 kg (193 lb)   Height: 161.3 cm (63.5\")     Vitals:    08/16/24 0858   PainSc: 0-No pain          ECOG score: 0           Vitals reviewed.  Labs reviewed    ECOG: (0) Fully Active - Able to Carry On All Pre-disease Performance Without Restriction    Lab Results   Component Value Date    HGB 11.0 (L) 08/14/2024    HCT 38.3 08/14/2024    MCV 84 08/14/2024     (H) 08/14/2024    WBC 7.7 08/14/2024    NEUTROABS 5.4 08/14/2024    LYMPHSABS 1.5 08/14/2024    MONOSABS 0.6 08/14/2024    EOSABS 0.0 08/14/2024    BASOSABS 0.1 08/14/2024       Lab Results   Component Value Date    GLUCOSE 114 (H) 08/14/2024    BUN 15 08/14/2024    CREATININE 0.90 08/14/2024     08/14/2024    K 5.0 08/14/2024     08/14/2024    CO2 22 08/14/2024    CALCIUM 9.7 08/14/2024    PROTEINTOT 6.6 06/21/2024    ALBUMIN 4.1 08/14/2024 "    BILITOT 0.2 08/14/2024    ALKPHOS 107 08/14/2024    AST 15 08/14/2024    ALT 11 08/14/2024             ASSESSMENT & PLAN:  1.  Bilateral breast cancer:  -Right side invasive ductal clinical T1c N0, 1.4 centimeter, ER positive LA positive HER2 negative, grade 2.  Pathologic 1.1 cm T1c N0 on mastectomy.  Recurrence score 44.  Distant recurrence risk on hormone blockade alone 32%. > 15% absolute chemotherapeutic benefit.  -Left side invasive ductal clinical T1b N0, 8 MM, ER negative, LA weakly positive, HER2/nabila negative, essentially triple negative grade 3.  Pathologic T1 a N0 with no residual tumor on mastectomy  Initial bilateral mastectomy recommended.    - Negative genetic testing.  -Received 2 cycles of TC and despite heavy premedication could not tolerate with reaction in the chair.  Hence, switch to CAF x 6 on 6/28/2024.  2.  COPD  3.  Dry flaky skin of her hands    Oncology history timeline:  -[facility]11[facility]20[facility] showed asymmetry right breast additional views needed with mass in left breast requiring additional imaging  -2/1/2024 bilateral diagnostic mammogram [place] [facility] showed persistent focal asymmetry right 12:00 7 cm from the nipple 7 mm hypoechoic mass indistinct with irregular margins ultrasound-guided biopsy recommended.  Left breast showed 8 mm 3:00 oval mass 10 cm from the nipple that on the ultrasound was 6 x 5 x 5 mm.  Incidental 4 mm 2:00 hypoechoic mass with internal vascularity 5 cm from the nipple for which ultrasound-guided biopsy recommended  -2/5/2024 right ultrasound-guided core biopsy 7 mm mass with HydroMARK T4 shaped tissue marker placed at the biopsy site.  Left ultrasound breast core biopsy 3:00 6 mm mass and 2:00 4 mm mass with HydroMARK T3 and T4 respectively.  Right breast 12:00 lesion grade 2 invasive ductal Jimenez-Cui 6 out of 9, 4 mm.  % 3+, LA 50% 3+.   Also intermediate grade ductal carcinoma in situ  Left breast 3:00 invasive ductal carcinoma grade 3 Bloom-Cui 9 out of 9, 5-6 mm, ER 0%, NV 25% 1+, HER2/nabila negative 1+  Left breast 2:00 core biopsy fibroadenoma  -2/12/2024 office note Dr. Gregorio Ashton indicates patient with newly diagnosed bilateral breast cancer.  Screening mammogram showed 2 abnormalities in the left breast and 1 in the right.    No personal or family history of breast cancer.  Has hot flashes but not taking estrogen supplements.  -2/13/2024 cervical spine x-ray negative    -2/15/2024 [facility] medical oncology follow-up: Patient has bilateral breast cancers as outlined on routine screening mammogram without any other symptoms.  After her diagnosis, she has had muscle tension aches in the left shoulder which she has a tens unit and recent injection of steroids by her primary care.  This is distinctly in the muscle she says and not in her bones.2/13/2024 cervical spine x-ray has C7 obscured by overlapping bone and soft tissues but otherwise unremarkable. Recent CBC and CMP had normal bone enzymes and was otherwise unremarkable.  Hence I would not make much out of the neck pain in the way of concern for metastatic disease unless this worsens.   She is under a lot of psychological strain from this diagnosis and is quite anxious and I will refer her to Ashlee Nayak are oncology psychiatrist and we will get our breast cancer navigator working with her.  The 7 mm right breast tumor is grade 2  % 3+, NV 50% 3+, HER2/nabila 0+.  The left breast is clinically 6 mm grade 3 invasive ductal ER negative NV weakly positive HER2/nabila 1 negative essentially triple negative.  We will get MRI of her breasts and if the lesion in the left breast turns out to be larger than 1 cm mammographically or is node positive then we would give neoadjuvant therapy.  If not, both for the 7 mm right breast and the 8 mm left breast tumor I would consider primary resection.   She has just started a job with [employer] in [place] and her insurance changes to [employer] [employer] [employer] in 2 weeks and she wants her surgery done in the [facility] facility for insurance reasons.  I will discuss this with Dr. Ashton (he was scrubbed when I called today) whose skills I recommended to her but we will make appropriate referrals per her request.  With her triple negativity she will also get genetic counseling.  Absent any other somatic complaints and assuming her neck continues to improve I would do no additional staging imaging in the absence of such symptoms.  We will also present her at our multidisciplinary tumor board once the MRI is completed for final decision making.She is committed to bilateral mammograms regardless of the results of the MRI or genetic testing.  Genetic testing is done on all triple negatives.She does have a history of gastric sleeve but still needs to lose weight.  She also carries a diagnosis of COPD and I am not sure who has managed that and Dr. QUAN may need her cleared by primary care/pulmonary before surgery but I will leave it to him.  She is not oxygen requiring.    -2/21/2024 bilateral breast MRI:  Right breast 12:00, 8 cm from nipple, 1.4 cm enhancing mass consistent with biopsy and no additional right breast lesions.  Left breast 2:00 5 cm from nipple is 8 mm masslike enhancement with central signal void artifact corresponding to biopsy site.  No additional suspicious sites in the left breast.  No internal mammary nor axillary salazar involvement on either side    -3/1/2024 [facility] medical oncology follow-up: We reviewed her MRI at multidisciplinary tumor board.  For the right breast cancer she would have surgery primarily followed by adjuvant hormone blockade but would not do Oncotype as, for the left breast for which we plan surgery upfront for the 8 mm size of tumor triple negative she will need chemo regardless.  If the left breast lesion ends up less than 1 cm node-negative  then we would give her Taxotere Cytoxan x 4.    If the left breast lesion is over a centimeter but less than 2 cm, then I would add Adriamycin.  If the left breast lesion is over 2 cm pathologically or node positive, then I would add carboplatin and Keytruda.  For the right breast cancer I would give her at least 5 and, if she is tolerating, 10 years of hormone blockade.  She had gone 1 year without menses but just started spotting.  I contacted Dr. Ramirez who will see her in [facility] office today for pelvic exam and to get hormone levels and to get ultrasound scheduled.  She has COPD without pulmonary function since prior to her surgery for bariatric surgery in 2019.  She smoked until November.  She is not requiring oxygen and uses her inhalers efficiently without major symptoms.  I spoke with Dr. QUAN and he is comfortable with proceeding without pulmonary function tests and he will get preoperative ABG.  I will see her back in about a month which will give her time to hopefully have healed from her surgery and we can then make plans for adjuvant therapy based upon the above algorithm.  She will need a port but Dr. QUAN does not want to place this during the time of surgery but he will make arrangements to do that postoperatively.    -3/21/2024 evacuation left mastectomy hematomaWith bilateral skin sparing mastectomies and bilateral sentinel node injection and biopsies.  Left breast mastectomy pathology shows no residual carcinoma and 1 benign sentinel node.  Pathologic T1 a N0 MA weakly positive essentially triple negative  Right breast mastectomy pathology shows 1.1 cm grade 3 invasive ductal carcinoma, 2 benign sentinel nodes pathologic T1c N0 ER/MA positive HER2/nabila negative.  Oncotype score 44 distant recurrence risk at 9 years 32%.  Greater than 15% absolute benefit from chemotherapy.    -4/2/2024 [facility] medical oncology follow-up: I reviewed the above bilateral mastectomy pathology reports with her.   Wounds are healing well though still with significant healing yet to do and still has drains in place and is due to see Dr. QUAN tomorrow.  Her left breast had no residual tumor and no lymph node involvement for a pathological T1a N0 weakly MN positive essentially triple negative breast cancer for which I have placed orders for Taxotere Cytoxan x 4.  For her right breast mastectomy which is healing well, she had a 1.1 cm grade 3 invasive ductal carcinoma with 2 benign sentinel nodes pathological T1c N0 ER and MN positive HER2/nabila negative for which I will treat her with Arimidex x 10 years following her chemotherapy.  We will get her to Dr. QUAN for port placement and she will have chemo preparation visit in our [place] office and we will start treatment in 2 weeks assuming Dr. QUAN has cleared her surgically.  No need for radiation given bilateral mastectomies.  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    -4/9/2024 chemotherapy education preparation and needs assessment completed    -4/12/2024 Genetic testing was negative for pathogenic mutations in BRCA1/2 and 46 additional genes on the Common Hereditary Cancers panel.      -5/2/2024 treatment began with TC for a planned 4 courses    -5/2/2024 [facility] oncology clinic follow-up: Ashlee is ready to begin adjuvant therapy with TC today for a planned 4 courses.  Her drains have been removed, she still has sutures in place but her mastectomy scars appear well-healed.  I reviewed labs from yesterday, counts acceptable to continue treatment today.  She is taking her dexamethasone that she started yesterday pretreatment for 3 days.  Once she completes chemotherapy we will plan on Arimidex for 10 years.  Her genetic testing was negative.     - 6/14/2024 [facility] medical oncology telemedicine follow-up: She received cycle 1 TC on 5/2/2024.  13 minutes into her docetaxel, she reported severe bilateral low back and hip pain flushing and  diaphoresis with mild abdominal discomfort and anxiousness.  Reaction medications administered.  Docetaxel restarted half rate and then increase to original rate and the remainder of the infusion well-tolerated.  She went to the emergency room 5/10/2024 with nausea vomiting and weakness lasting the previous 3 days concerned about dehydration.  5/21/2024 hemoglobin 8.3 platelets 844,000 with MCV 79.6 otherwise normal CBC and differential.  Potassium 5.3 normal creatinine otherwise unremarkable CMP.  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  As she has a very high recurrence score, I would try the Taxotere again with institution of all the rescue meds upfront and 0.5 mg IV Ativan premed.  We will do this next week in our [place] office and if she tolerates that we will see her back 3 weeks later for cycle 3 of 4.      -6/21/2024 note: Called to infusion.  Patient had had all the rescue occasions given upfront and despite that had terrible back pain.  Not as excruciating as the first cycle but still too much to handle.  No wheezing or rashes but nonetheless concerning for potential infusion allergic reaction.  In the literature looking at docetaxel after paclitaxel infusion reactions, there was a 50% chance of reaction suggesting that it is the taxane moiety and not just the vehicle causing the reaction in all cases.  Hence, I am hesitant to use Taxol in place of Taxotere.  To that end, I will get her ejection fraction and assuming it is normal we will try 6 cycles of CAF.  Prozac has a category X potential increase of Adriamycin levels.  She is prescribed this by her primary care.  I will discontinue and substitute Effexor XR 75 mg daily which does not have this interaction.          -6/26/2024 echocardiogram ejection  fraction 51-55%.  GLS -22%  CAF PEG filgrastim Pharm.D. education [place]     -6/28/2024 CAF cycle 1/6    -7/25/2024 [facility] medical oncology follow-up: Tolerated cycle 1 of CAF with no side effects.  Now on Effexor instead of Prozac for the category X interaction with Prozac with no problems and feeling fairly fit other than some fatigue.  Continue 2 out of 6 CAF with follow-up 8/9/2024.  See above for subsequent plans    -8/16/2024 [facility] oncology clinic follow-up: Ashlee is doing well on current treatment with CAF with no unusual side effects.  Labs reviewed from 8/14/2024, counts acceptable to continue treatment unchanged.  She has had no further back pain with her treatments.  We will proceed today with cycle 3 of a planned 6 courses.  Since she is tolerating this well we will go ahead and move her back to our [place] location for subsequent treatments.  She will continue using emollient lotions on her hands, and looks like she may have eczema.  Once we have finished treatment recommend she see a dermatologist.  She continues to do well on Effexor after changing from Prozac.    Return to clinic in 3 weeks for follow-up    This was a level 4, moderate MDM visit with management of side effects of therapy, review of labs, management of drug therapy requiring intensive monitoring for toxicity.  Danni Campo, APRN    08/16/2024

## 2024-08-21 ENCOUNTER — OFFICE VISIT (OUTPATIENT)
Dept: FAMILY MEDICINE CLINIC | Facility: CLINIC | Age: 49
End: 2024-08-21
Payer: COMMERCIAL

## 2024-08-21 VITALS
OXYGEN SATURATION: 95 % | SYSTOLIC BLOOD PRESSURE: 118 MMHG | DIASTOLIC BLOOD PRESSURE: 82 MMHG | BODY MASS INDEX: 32.95 KG/M2 | HEART RATE: 100 BPM | WEIGHT: 193 LBS | HEIGHT: 64 IN

## 2024-08-21 DIAGNOSIS — R05.1 ACUTE COUGH: ICD-10-CM

## 2024-08-21 DIAGNOSIS — J40 BRONCHITIS: Primary | ICD-10-CM

## 2024-08-21 LAB
EXPIRATION DATE: NORMAL
FLUAV AG UPPER RESP QL IA.RAPID: NOT DETECTED
FLUBV AG UPPER RESP QL IA.RAPID: NOT DETECTED
INTERNAL CONTROL: NORMAL
Lab: NORMAL
SARS-COV-2 AG UPPER RESP QL IA.RAPID: NOT DETECTED

## 2024-08-21 PROCEDURE — 87428 SARSCOV & INF VIR A&B AG IA: CPT | Performed by: NURSE PRACTITIONER

## 2024-08-21 PROCEDURE — 99214 OFFICE O/P EST MOD 30 MIN: CPT | Performed by: NURSE PRACTITIONER

## 2024-08-21 RX ORDER — AZITHROMYCIN 250 MG/1
TABLET, FILM COATED ORAL
Qty: 6 TABLET | Refills: 0 | Status: SHIPPED | OUTPATIENT
Start: 2024-08-21 | End: 2024-08-21

## 2024-08-21 RX ORDER — DEXTROMETHORPHAN HYDROBROMIDE AND PROMETHAZINE HYDROCHLORIDE 15; 6.25 MG/5ML; MG/5ML
5 SYRUP ORAL 4 TIMES DAILY PRN
Qty: 120 ML | Refills: 0 | Status: SHIPPED | OUTPATIENT
Start: 2024-08-21

## 2024-08-21 RX ORDER — AZITHROMYCIN 250 MG/1
TABLET, FILM COATED ORAL
Qty: 6 TABLET | Refills: 0 | Status: SHIPPED | OUTPATIENT
Start: 2024-08-21 | End: 2024-08-26

## 2024-08-21 RX ORDER — PREDNISONE 20 MG/1
TABLET ORAL
Qty: 18 TABLET | Refills: 0 | Status: SHIPPED | OUTPATIENT
Start: 2024-08-21

## 2024-08-21 RX ORDER — BROMPHENIRAMINE MALEATE, PSEUDOEPHEDRINE HYDROCHLORIDE, AND DEXTROMETHORPHAN HYDROBROMIDE 2; 30; 10 MG/5ML; MG/5ML; MG/5ML
5 SYRUP ORAL 4 TIMES DAILY PRN
Qty: 120 ML | Refills: 0 | Status: SHIPPED | OUTPATIENT
Start: 2024-08-21

## 2024-08-21 NOTE — PROGRESS NOTES
"Chief Complaint  Cough (Soa,fatigue. Since Sunday./)    Subjective          Ashlee Marte presents to [facility]  History of Present Illness  Pt has had cough and congestion since Sunday. She denies fever, chills, or myalgias. She has had tightness in her chest and shortness of breath at times. She had chemo on Friday for breast cancer.   Cough  Pertinent negatives include no chest pain, fever or shortness of breath.       Objective   Vital Signs:   /82   Pulse 100   Ht 161.3 cm (63.5\")   Wt 87.5 kg (193 lb)   SpO2 95%   BMI 33.65 kg/m²     Body mass index is 33.65 kg/m².    Review of Systems   Constitutional:  Negative for fatigue and fever.   HENT:  Positive for congestion.    Respiratory:  Positive for cough. Negative for shortness of breath.    Cardiovascular:  Negative for chest pain, palpitations and leg swelling.   Neurological:  Negative for syncope.   Psychiatric/Behavioral:  The patient is not nervous/anxious.           Current Outpatient Medications:     albuterol sulfate HFA (Ventolin HFA) 108 (90 Base) MCG/ACT inhaler, Inhale 2 puffs Every 4 (Four) Hours As Needed for wheezing, Disp: 18 g, Rfl: 11    albuterol sulfate  (90 Base) MCG/ACT inhaler, Inhale 2 puffs Every 4 (Four) Hours As Needed for Wheezing., Disp: 18 g, Rfl: 11    budesonide-formoterol (Symbicort) 80-4.5 MCG/ACT inhaler, Inhale 2 puffs 2 (Two) Times a Day., Disp: 10.2 g, Rfl: 12    cloNIDine (CATAPRES) 0.1 MG tablet, TAKE 1 TABLET BY MOUTH 2 TIMES A DAY, Disp: 60 tablet, Rfl: 0    folic acid (FOLVITE) 1 MG tablet, Take 1 tablet by mouth Daily., Disp: 90 tablet, Rfl: 1    folic acid (FOLVITE) 1 MG tablet, Take 1 tablet by mouth Daily., Disp: 90 tablet, Rfl: 1    lidocaine-prilocaine (EMLA) 2.5-2.5 % cream, Apply 1 Application topically to the appropriate area as directed As Needed (45-60 minutes prior to port access.  Cover with saran/plastic wrap.)., Disp: 30 g, Rfl: 3    LORazepam " (Ativan) 1 MG tablet, Take 1 tablet by mouth Every 8 (Eight) Hours As Needed for Anxiety., Disp: 30 tablet, Rfl: 0    metoprolol succinate XL (Toprol XL) 25 MG 24 hr tablet, Take 1 tablet by mouth Daily., Disp: 30 tablet, Rfl: 2    OLANZapine (zyPREXA) 5 MG tablet, Take 1 tablet by mouth Every Night for 4 doses starting night of chemotherapy., Disp: 4 tablet, Rfl: 5    omeprazole (priLOSEC) 40 MG capsule, Take 1 capsule by mouth 2 (Two) Times a Day., Disp: 180 capsule, Rfl: 3    omeprazole (priLOSEC) 40 MG capsule, Take 1 capsule by mouth 2 (Two) Times a Day., Disp: 180 capsule, Rfl: 3    ondansetron (ZOFRAN) 8 MG tablet, Take 1 tablet by mouth 3 (Three) Times a Day As Needed for Nausea or Vomiting., Disp: 30 tablet, Rfl: 5    Plecanatide (Trulance) 3 MG tablet, Take 1 tablet by mouth Daily., Disp: 90 tablet, Rfl: 3    venlafaxine XR (EFFEXOR-XR) 75 MG 24 hr capsule, Take 1 capsule by mouth Daily., Disp: 30 capsule, Rfl: 11    vitamin D (ERGOCALCIFEROL) 1.25 MG (28110 UT) capsule capsule, Take 1 capsule by mouth 1 (One) Time Per Week., Disp: 12 capsule, Rfl: 1    azithromycin (Zithromax Z-Kyle) 250 MG tablet, Take 2 tablets by mouth Daily for 1 day, THEN 1 tablet Daily for 4 days., Disp: 6 tablet, Rfl: 0    predniSONE (DELTASONE) 20 MG tablet, 3 QD x 3 days, 2 QD x 3 days, 1 QD x 3 days, Disp: 18 tablet, Rfl: 0    promethazine-dextromethorphan (PROMETHAZINE-DM) 6.25-15 MG/5ML syrup, Take 5 mL by mouth 4 (Four) Times a Day As Needed for Cough., Disp: 120 mL, Rfl: 0      Allergies: Patient has no known allergies.    Physical Exam  Constitutional:       Appearance: Normal appearance.   HENT:      Head: Normocephalic.   Eyes:      Conjunctiva/sclera: Conjunctivae normal.      Pupils: Pupils are equal, round, and reactive to light.   Cardiovascular:      Rate and Rhythm: Normal rate and regular rhythm.      Heart sounds: Normal heart sounds.   Pulmonary:      Effort: Pulmonary effort is normal.      Breath sounds: Normal  breath sounds.   Abdominal:      Tenderness: There is no abdominal tenderness.   Musculoskeletal:         General: Normal range of motion.   Skin:     General: Skin is warm and dry.      Capillary Refill: Capillary refill takes less than 2 seconds.   Neurological:      General: No focal deficit present.      Mental Status: She is alert and oriented to person, place, and time.   Psychiatric:         Mood and Affect: Mood normal.         Behavior: Behavior normal.         Thought Content: Thought content normal.         Judgment: Judgment normal.          Result Review :                   Assessment and Plan    Diagnoses and all orders for this visit:    1. Bronchitis (Primary)  Comments:  Increase fluids. Finish antibiotics and prednisone. Mucinex, Phen DM, and Albuterol PRN. RTC for worsened sx and if not improving in 1 week.  Orders:  -     predniSONE (DELTASONE) 20 MG tablet; 3 QD x 3 days, 2 QD x 3 days, 1 QD x 3 days  Dispense: 18 tablet; Refill: 0  -     promethazine-dextromethorphan (PROMETHAZINE-DM) 6.25-15 MG/5ML syrup; Take 5 mL by mouth 4 (Four) Times a Day As Needed for Cough.  Dispense: 120 mL; Refill: 0  -     azithromycin (Zithromax Z-Kyle) 250 MG tablet; Take 2 tablets by mouth Daily for 1 day, THEN 1 tablet Daily for 4 days.  Dispense: 6 tablet; Refill: 0    2. Acute cough  -     XR Chest PA & Lateral; Future  -     POCT SARS-CoV-2 Antigen YVROSE + Flu                Follow Up   Return in about 1 week (around 8/28/2024) for if not improving or sooner if symptoms worsen.  Patient was given instructions and counseling regarding her condition or for health maintenance advice. Please see specific information pulled into the AVS if appropriate.     Wen Todd, SHAVON

## 2024-09-03 ENCOUNTER — LAB (OUTPATIENT)
Dept: FAMILY MEDICINE CLINIC | Facility: CLINIC | Age: 49
End: 2024-09-03
Payer: COMMERCIAL

## 2024-09-04 LAB
ALBUMIN SERPL-MCNC: 4 G/DL (ref 3.8–4.9)
ALP SERPL-CCNC: 102 IU/L (ref 44–121)
ALT SERPL-CCNC: 9 IU/L (ref 0–32)
AST SERPL-CCNC: 16 IU/L (ref 0–40)
BASOPHILS # BLD AUTO: 0.1 X10E3/UL (ref 0–0.2)
BASOPHILS NFR BLD AUTO: 1 %
BILIRUB SERPL-MCNC: <0.2 MG/DL (ref 0–1.2)
BUN SERPL-MCNC: 6 MG/DL (ref 6–24)
BUN/CREAT SERPL: 7 (ref 9–23)
CALCIUM SERPL-MCNC: 9.1 MG/DL (ref 8.7–10.2)
CHLORIDE SERPL-SCNC: 101 MMOL/L (ref 96–106)
CO2 SERPL-SCNC: 22 MMOL/L (ref 20–29)
CREAT SERPL-MCNC: 0.84 MG/DL (ref 0.57–1)
EGFRCR SERPLBLD CKD-EPI 2021: 84 ML/MIN/1.73
EOSINOPHIL # BLD AUTO: 0 X10E3/UL (ref 0–0.4)
EOSINOPHIL NFR BLD AUTO: 0 %
ERYTHROCYTE [DISTWIDTH] IN BLOOD BY AUTOMATED COUNT: 21.8 % (ref 11.7–15.4)
GLOBULIN SER CALC-MCNC: 2.4 G/DL (ref 1.5–4.5)
GLUCOSE SERPL-MCNC: 92 MG/DL (ref 70–99)
HCT VFR BLD AUTO: 36.4 % (ref 34–46.6)
HGB BLD-MCNC: 10.9 G/DL (ref 11.1–15.9)
IMM GRANULOCYTES # BLD AUTO: 0.1 X10E3/UL (ref 0–0.1)
IMM GRANULOCYTES NFR BLD AUTO: 1 %
LYMPHOCYTES # BLD AUTO: 1.8 X10E3/UL (ref 0.7–3.1)
LYMPHOCYTES NFR BLD AUTO: 28 %
MCH RBC QN AUTO: 25.1 PG (ref 26.6–33)
MCHC RBC AUTO-ENTMCNC: 29.9 G/DL (ref 31.5–35.7)
MCV RBC AUTO: 84 FL (ref 79–97)
MONOCYTES # BLD AUTO: 0.8 X10E3/UL (ref 0.1–0.9)
MONOCYTES NFR BLD AUTO: 12 %
NEUTROPHILS # BLD AUTO: 3.7 X10E3/UL (ref 1.4–7)
NEUTROPHILS NFR BLD AUTO: 58 %
PLATELET # BLD AUTO: 532 X10E3/UL (ref 150–450)
POTASSIUM SERPL-SCNC: 4.6 MMOL/L (ref 3.5–5.2)
PROT SERPL-MCNC: 6.4 G/DL (ref 6–8.5)
RBC # BLD AUTO: 4.35 X10E6/UL (ref 3.77–5.28)
SODIUM SERPL-SCNC: 139 MMOL/L (ref 134–144)
WBC # BLD AUTO: 6.3 X10E3/UL (ref 3.4–10.8)

## 2024-09-05 ENCOUNTER — OFFICE VISIT (OUTPATIENT)
Dept: ONCOLOGY | Facility: CLINIC | Age: 49
End: 2024-09-05
Payer: COMMERCIAL

## 2024-09-05 ENCOUNTER — INFUSION (OUTPATIENT)
Dept: ONCOLOGY | Facility: HOSPITAL | Age: 49
End: 2024-09-05
Payer: COMMERCIAL

## 2024-09-05 VITALS
HEIGHT: 64 IN | BODY MASS INDEX: 32.61 KG/M2 | WEIGHT: 191 LBS | HEART RATE: 116 BPM | RESPIRATION RATE: 18 BRPM | OXYGEN SATURATION: 96 % | DIASTOLIC BLOOD PRESSURE: 91 MMHG | SYSTOLIC BLOOD PRESSURE: 137 MMHG | TEMPERATURE: 97.7 F

## 2024-09-05 DIAGNOSIS — Z17.0 MALIGNANT NEOPLASM OF OVERLAPPING SITES OF BOTH BREASTS IN FEMALE, ESTROGEN RECEPTOR POSITIVE: Primary | ICD-10-CM

## 2024-09-05 DIAGNOSIS — Z17.1 MALIGNANT NEOPLASM OF OVERLAPPING SITES OF RIGHT BREAST IN FEMALE, ESTROGEN RECEPTOR NEGATIVE: ICD-10-CM

## 2024-09-05 DIAGNOSIS — C50.811 MALIGNANT NEOPLASM OF OVERLAPPING SITES OF RIGHT BREAST IN FEMALE, ESTROGEN RECEPTOR NEGATIVE: ICD-10-CM

## 2024-09-05 DIAGNOSIS — C50.812 MALIGNANT NEOPLASM OF OVERLAPPING SITES OF BOTH BREASTS IN FEMALE, ESTROGEN RECEPTOR POSITIVE: Primary | ICD-10-CM

## 2024-09-05 DIAGNOSIS — C50.811 MALIGNANT NEOPLASM OF OVERLAPPING SITES OF BOTH BREASTS IN FEMALE, ESTROGEN RECEPTOR POSITIVE: Primary | ICD-10-CM

## 2024-09-05 PROCEDURE — 25010000002 FLUOROURACIL PER 500 MG: Performed by: NURSE PRACTITIONER

## 2024-09-05 PROCEDURE — 25810000003 SODIUM CHLORIDE 0.9 % SOLUTION 250 ML FLEX CONT: Performed by: NURSE PRACTITIONER

## 2024-09-05 PROCEDURE — 96375 TX/PRO/DX INJ NEW DRUG ADDON: CPT

## 2024-09-05 PROCEDURE — 25010000002 FOSAPREPITANT PER 1 MG: Performed by: NURSE PRACTITIONER

## 2024-09-05 PROCEDURE — 96376 TX/PRO/DX INJ SAME DRUG ADON: CPT

## 2024-09-05 PROCEDURE — 25010000002 DEXAMETHASONE SODIUM PHOSPHATE 100 MG/10ML SOLUTION: Performed by: NURSE PRACTITIONER

## 2024-09-05 PROCEDURE — 99214 OFFICE O/P EST MOD 30 MIN: CPT | Performed by: NURSE PRACTITIONER

## 2024-09-05 PROCEDURE — 96377 APPLICATON ON-BODY INJECTOR: CPT

## 2024-09-05 PROCEDURE — 96411 CHEMO IV PUSH ADDL DRUG: CPT

## 2024-09-05 PROCEDURE — 25010000002 PALONOSETRON PER 25 MCG: Performed by: NURSE PRACTITIONER

## 2024-09-05 PROCEDURE — 25010000002 HEPARIN LOCK FLUSH PER 10 UNITS: Performed by: INTERNAL MEDICINE

## 2024-09-05 PROCEDURE — 96367 TX/PROPH/DG ADDL SEQ IV INF: CPT

## 2024-09-05 PROCEDURE — 25010000002 PEGFILGRASTIM 6 MG/0.6ML PREFILLED SYRINGE KIT: Performed by: NURSE PRACTITIONER

## 2024-09-05 PROCEDURE — 25010000002 DOXORUBICIN PER 10 MG: Performed by: NURSE PRACTITIONER

## 2024-09-05 PROCEDURE — 25010000002 CYCLOPHOSPHAMIDE 2 GM/10ML SOLUTION 10 ML VIAL: Performed by: NURSE PRACTITIONER

## 2024-09-05 PROCEDURE — 96413 CHEMO IV INFUSION 1 HR: CPT

## 2024-09-05 PROCEDURE — 25810000003 SODIUM CHLORIDE 0.9 % SOLUTION: Performed by: NURSE PRACTITIONER

## 2024-09-05 RX ORDER — HEPARIN SODIUM (PORCINE) LOCK FLUSH IV SOLN 100 UNIT/ML 100 UNIT/ML
500 SOLUTION INTRAVENOUS AS NEEDED
Status: DISCONTINUED | OUTPATIENT
Start: 2024-09-05 | End: 2024-09-05 | Stop reason: HOSPADM

## 2024-09-05 RX ORDER — SODIUM CHLORIDE 9 MG/ML
20 INJECTION, SOLUTION INTRAVENOUS ONCE
Status: COMPLETED | OUTPATIENT
Start: 2024-09-05 | End: 2024-09-05

## 2024-09-05 RX ORDER — SODIUM CHLORIDE 9 MG/ML
20 INJECTION, SOLUTION INTRAVENOUS ONCE
Status: CANCELLED | OUTPATIENT
Start: 2024-09-05

## 2024-09-05 RX ORDER — PALONOSETRON 0.05 MG/ML
0.25 INJECTION, SOLUTION INTRAVENOUS ONCE
Status: CANCELLED | OUTPATIENT
Start: 2024-09-05

## 2024-09-05 RX ORDER — METOPROLOL SUCCINATE 25 MG/1
25 TABLET, EXTENDED RELEASE ORAL DAILY
Qty: 30 TABLET | Refills: 2 | Status: SHIPPED | OUTPATIENT
Start: 2024-09-05

## 2024-09-05 RX ORDER — DOXORUBICIN HYDROCHLORIDE 2 MG/ML
50 INJECTION, SOLUTION INTRAVENOUS ONCE
Status: COMPLETED | OUTPATIENT
Start: 2024-09-05 | End: 2024-09-05

## 2024-09-05 RX ORDER — FLUOROURACIL 50 MG/ML
500 INJECTION, SOLUTION INTRAVENOUS ONCE
Status: CANCELLED | OUTPATIENT
Start: 2024-09-05

## 2024-09-05 RX ORDER — DOXORUBICIN HYDROCHLORIDE 2 MG/ML
50 INJECTION, SOLUTION INTRAVENOUS ONCE
Status: CANCELLED | OUTPATIENT
Start: 2024-09-05

## 2024-09-05 RX ORDER — FLUOROURACIL 50 MG/ML
1000 INJECTION, SOLUTION INTRAVENOUS ONCE
Status: COMPLETED | OUTPATIENT
Start: 2024-09-05 | End: 2024-09-05

## 2024-09-05 RX ORDER — PALONOSETRON 0.05 MG/ML
0.25 INJECTION, SOLUTION INTRAVENOUS ONCE
Status: COMPLETED | OUTPATIENT
Start: 2024-09-05 | End: 2024-09-05

## 2024-09-05 RX ORDER — HEPARIN SODIUM (PORCINE) LOCK FLUSH IV SOLN 100 UNIT/ML 100 UNIT/ML
500 SOLUTION INTRAVENOUS AS NEEDED
OUTPATIENT
Start: 2024-09-05

## 2024-09-05 RX ORDER — SODIUM CHLORIDE 0.9 % (FLUSH) 0.9 %
20 SYRINGE (ML) INJECTION AS NEEDED
OUTPATIENT
Start: 2024-09-05

## 2024-09-05 RX ADMIN — FOSAPREPITANT DIMEGLUMINE 150 MG: 150 INJECTION, POWDER, LYOPHILIZED, FOR SOLUTION INTRAVENOUS at 10:26

## 2024-09-05 RX ADMIN — CYCLOPHOSPHAMIDE 960 MG: 2 INJECTION INTRAVENOUS at 11:28

## 2024-09-05 RX ADMIN — SODIUM CHLORIDE 20 ML/HR: 9 INJECTION, SOLUTION INTRAVENOUS at 10:24

## 2024-09-05 RX ADMIN — DEXAMETHASONE SODIUM PHOSPHATE 12 MG: 10 INJECTION, SOLUTION INTRAMUSCULAR; INTRAVENOUS at 10:26

## 2024-09-05 RX ADMIN — PEGFILGRASTIM 6 MG: KIT SUBCUTANEOUS at 12:16

## 2024-09-05 RX ADMIN — PALONOSETRON HYDROCHLORIDE 0.25 MG: 0.25 INJECTION INTRAVENOUS at 10:24

## 2024-09-05 RX ADMIN — DOXORUBICIN HYDROCHLORIDE 48 MG: 2 INJECTION, SOLUTION INTRAVENOUS at 11:18

## 2024-09-05 RX ADMIN — HEPARIN 500 UNITS: 100 SYRINGE at 12:14

## 2024-09-05 RX ADMIN — FLUOROURACIL 1000 MG: 50 INJECTION, SOLUTION INTRAVENOUS at 12:09

## 2024-09-05 NOTE — PROGRESS NOTES
CHIEF COMPLAINT: Recent treatment for bronchitis and seasonal allergies    Problem List:  Oncology/Hematology History Overview Note   1.  Bilateral breast cancer:  -Right side invasive ductal clinical T1c N0, 1.4 centimeter, ER positive ID positive HER2 negative, grade 2.  Pathologic 1.1 cm T1c N0 on mastectomy.  Recurrence score 44.  Distant recurrence risk on hormone blockade alone 32%. > 15% absolute chemotherapeutic benefit.  -Left side invasive ductal clinical T1b N0, 8 MM, ER negative, ID weakly positive, HER2/nabila negative, essentially triple negative grade 3.  Pathologic T1 a N0 with no residual tumor on mastectomy  Initial bilateral mastectomy recommended.    - Negative genetic testing.  -Received 2 cycles of TC and despite heavy premedication could not tolerate with reaction in the chair.  Hence, switch to CAF x 6 on 6/28/2024.  2.  COPD    Oncology history timeline:  -1/11/2024 [facility] imaging showed asymmetry right breast additional views needed with mass in left breast requiring additional imaging  -2/1/2024 bilateral diagnostic mammogram [place] [facility] showed persistent focal asymmetry right 12:00 7 cm from the nipple 7 mm hypoechoic mass indistinct with irregular margins ultrasound-guided biopsy recommended.  Left breast showed 8 mm 3:00 oval mass 10 cm from the nipple that on the ultrasound was 6 x 5 x 5 mm.  Incidental 4 mm 2:00 hypoechoic mass with internal vascularity 5 cm from the nipple for which ultrasound-guided biopsy recommended  -2/5/2024 right ultrasound-guided core biopsy 7 mm mass with HydroMARK T4 shaped tissue marker placed at the biopsy site.  Left ultrasound breast core biopsy 3:00 6 mm mass and 2:00 4 mm mass with HydroMARK T3 and T4 respectively.  Right breast 12:00 lesion grade 2 invasive ductal Jimenez-Cui 6 out of 9, 4 mm.  % 3+, ID 50% 3+.  Also intermediate grade  ductal carcinoma in situ  Left breast 3:00 invasive ductal carcinoma grade 3 Bloom-Cui 9 out of 9, 5-6 mm, ER 0%, KS 25% 1+, HER2/nabila negative 1+  Left breast 2:00 core biopsy fibroadenoma  -2/12/2024 office note Dr. Gregorio Ashton indicates patient with newly diagnosed bilateral breast cancer.  Screening mammogram showed 2 abnormalities in the left breast and 1 in the right.    No personal or family history of breast cancer.  Has hot flashes but not taking estrogen supplements.  -2/13/2024 cervical spine x-ray negative    -2/15/2024 [facility] medical oncology follow-up: Patient has bilateral breast cancers as outlined on routine screening mammogram without any other symptoms.  After her diagnosis, she has had muscle tension aches in the left shoulder which she has a tens unit and recent injection of steroids by her primary care.  This is distinctly in the muscle she says and not in her bones.2/13/2024 cervical spine x-ray has C7 obscured by overlapping bone and soft tissues but otherwise unremarkable. Recent CBC and CMP had normal bone enzymes and was otherwise unremarkable.  Hence I would not make much out of the neck pain in the way of concern for metastatic disease unless this worsens.   She is under a lot of psychological strain from this diagnosis and is quite anxious and I will refer her to Ashlee Nayak are oncology psychiatrist and we will get our breast cancer navigator working with her.  The 7 mm right breast tumor is grade 2  % 3+, KS 50% 3+, HER2/nabila 0+.  The left breast is clinically 6 mm grade 3 invasive ductal ER negative KS weakly positive HER2/nabila 1 negative essentially triple negative.  We will get MRI of her breasts and if the lesion in the left breast turns out to be larger than 1 cm mammographically or is node positive then we would give neoadjuvant therapy.  If not, both for the 7 mm right breast and the 8 mm left breast tumor I would consider primary resection.  She has just started a  job with [employer] [place], operated by [employer] in [place] and her insurance changes to [employer] [place] [place] by [employer] in 2 weeks and she wants her surgery done in the [facility] facility for insurance reasons.  I will discuss this with Dr. Ashton (he was scrubbed when I called today) whose skills I recommended to her but we will make appropriate referrals per her request.  With her triple negativity she will also get genetic counseling.  Absent any other somatic complaints and assuming her neck continues to improve I would do no additional staging imaging in the absence of such symptoms.  We will also present her at our multidisciplinary tumor board once the MRI is completed for final decision making.She is committed to bilateral mammograms regardless of the results of the MRI or genetic testing.  Genetic testing is done on all triple negatives.She does have a history of gastric sleeve but still needs to lose weight.  She also carries a diagnosis of COPD and I am not sure who has managed that and Dr. QUAN may need her cleared by primary care/pulmonary before surgery but I will leave it to him.  She is not oxygen requiring.    -2/21/2024 bilateral breast MRI:  Right breast 12:00, 8 cm from nipple, 1.4 cm enhancing mass consistent with biopsy and no additional right breast lesions.  Left breast 2:00 5 cm from nipple is 8 mm masslike enhancement with central signal void artifact corresponding to biopsy site.  No additional suspicious sites in the left breast.  No internal mammary nor axillary salazar involvement on either side    -3/1/2024 [employer] [facility] [place] medical oncology follow-up: We reviewed her MRI at multidisciplinary tumor board.  For the right breast cancer she would have surgery primarily followed by adjuvant hormone blockade but would not do Oncotype as, for the left breast for which we plan surgery upfront for the 8 mm size of tumor triple negative she will need chemo regardless.  If the left breast lesion ends up less than 1 cm node-negative then we would give her  Taxotere Cytoxan x 4.    If the left breast lesion is over a centimeter but less than 2 cm, then I would add Adriamycin.  If the left breast lesion is over 2 cm pathologically or node positive, then I would add carboplatin and Keytruda.  For the right breast cancer I would give her at least 5 and, if she is tolerating, 10 years of hormone blockade.  She had gone 1 year without menses but just started spotting.  I contacted Dr. Ramirez who will see her in [facility] office today for pelvic exam and to get hormone levels and to get ultrasound scheduled.  She has COPD without pulmonary function since prior to her surgery for bariatric surgery in 2019.  She smoked until November.  She is not requiring oxygen and uses her inhalers efficiently without major symptoms.  I spoke with Dr. QUAN and he is comfortable with proceeding without pulmonary function tests and he will get preoperative ABG.  I will see her back in about a month which will give her time to hopefully have healed from her surgery and we can then make plans for adjuvant therapy based upon the above algorithm.  She will need a port but Dr. QUAN does not want to place this during the time of surgery but he will make arrangements to do that postoperatively.    -3/21/2024 evacuation left mastectomy hematomaWith bilateral skin sparing mastectomies and bilateral sentinel node injection and biopsies.  Left breast mastectomy pathology shows no residual carcinoma and 1 benign sentinel node.  Pathologic T1 a N0 FL weakly positive essentially triple negative  Right breast mastectomy pathology shows 1.1 cm grade 3 invasive ductal carcinoma, 2 benign sentinel nodes pathologic T1c N0 ER/FL positive HER2/nabila negative.  Oncotype score 44 distant recurrence risk at 9 years 32%.  Greater than 15% absolute benefit from chemotherapy.    -4/2/2024 [facility] medical oncology follow-up: I reviewed the above bilateral mastectomy pathology reports with her.  Wounds are healing well  though still with significant healing yet to do and still has drains in place and is due to see Dr. QUAN tomorrow.  Her left breast had no residual tumor and no lymph node involvement for a pathological T1a N0 weakly IL positive essentially triple negative breast cancer for which I have placed orders for Taxotere Cytoxan x 4.  For her right breast mastectomy which is healing well, she had a 1.1 cm grade 3 invasive ductal carcinoma with 2 benign sentinel nodes pathological T1c N0 ER and IL positive HER2/nabila negative for which I will treat her with Arimidex x 10 years following her chemotherapy.  We will get her to Dr. QUAN for port placement and she will have chemo preparation visit in our [place] office and we will start treatment in 2 weeks assuming Dr. QUAN has cleared her surgically.  No need for radiation given bilateral mastectomies.  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    -4/9/2024 chemotherapy education preparation and needs assessment completed    -4/12/2024 Genetic testing was negative for pathogenic mutations in BRCA1/2 and 46 additional genes on the Common Hereditary Cancers panel.      -5/2/2024 treatment began with TC for a planned 4 courses    -5/2/2024 [facility] oncology clinic follow-up: Ashlee is ready to begin adjuvant therapy with TC today for a planned 4 courses.  Her drains have been removed, she still has sutures in place but her mastectomy scars appear well-healed.  I reviewed labs from yesterday, counts acceptable to continue treatment today.  She is taking her dexamethasone that she started yesterday pretreatment for 3 days.  Once she completes chemotherapy we will plan on Arimidex for 10 years.  Her genetic testing was negative.     - 6/14/2024 [facility] medical oncology telemedicine follow-up: She received cycle 1 TC on 5/2/2024.  13 minutes into her docetaxel, she reported severe bilateral low back and hip pain flushing and diaphoresis with mild  abdominal discomfort and anxiousness.  Reaction medications administered.  Docetaxel restarted half rate and then increase to original rate and the remainder of the infusion well-tolerated.  She went to the emergency room 5/10/2024 with nausea vomiting and weakness lasting the previous 3 days concerned about dehydration.  5/21/2024 hemoglobin 8.3 platelets 844,000 with MCV 79.6 otherwise normal CBC and differential.  Potassium 5.3 normal creatinine otherwise unremarkable CMP.  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  As she has a very high recurrence score, I would try the Taxotere again with institution of all the rescue meds upfront and 0.5 mg IV Ativan premed.  We will do this next week in our [place] office and if she tolerates that we will see her back 3 weeks later for cycle 3 of 4.      -6/21/2024 note: Called to infusion.  Patient had had all the rescue occasions given upfront and despite that had terrible back pain.  Not as excruciating as the first cycle but still too much to handle.  No wheezing or rashes but nonetheless concerning for potential infusion allergic reaction.  In the literature looking at docetaxel after paclitaxel infusion reactions, there was a 50% chance of reaction suggesting that it is the taxane moiety and not just the vehicle causing the reaction in all cases.  Hence, I am hesitant to use Taxol in place of Taxotere.  To that end, I will get her ejection fraction and assuming it is normal we will try 6 cycles of CAF.  Prozac has a category X potential increase of Adriamycin levels.  She is prescribed this by her primary care.  I will discontinue and substitute Effexor XR 75 mg daily which does not have this interaction.          -6/26/2024 echocardiogram ejection fraction 51-55%.  GLS  -22%  CAF PEG filgrastim Pharm.D. education Debo Moran    -6/28/2024 CAF cycle 1/6    -7/25/2024 [facility] medical oncology follow-up: Tolerated cycle 1 of CAF with no side effects.  Now on Effexor instead of Prozac for the category X interaction with Prozac with no problems and feeling fairly fit other than some fatigue.  Continue 2 out of 6 CAF with follow-up 8/9/2024.  See above for subsequent plans    -8/16/2024 [facility] oncology clinic follow-up: Ashlee is doing well on current treatment with CAF with no unusual side effects.  Labs reviewed from 8/14/2024, counts acceptable to continue treatment unchanged.  She has had no further back pain with her treatments.  We will proceed today with cycle 3 of a planned 6 courses.  Since she is tolerating this well we will go ahead and move her back to our [place] location for subsequent treatments.  She will continue using emollient lotions on her hands, and looks like she may have eczema.  Once we have finished treatment recommend she see a dermatologist.  She continues to do well on Effexor after changing from Prozac.     Malignant neoplasm of overlapping sites of both breasts in female, estrogen receptor positive   5/1/2024 -  Chemotherapy    OP CENTRAL VENOUS ACCESS DEVICE Access, Care, and Maintenance (CVAD)     5/2/2024 - 6/21/2024 Chemotherapy    OP BREAST TC DOCEtaxel / Cyclophosphamide     6/21/2024 -  Chemotherapy    OP CVAD Occlusion - Alteplase     6/28/2024 -  Chemotherapy    OP BREAST FAC DOXOrubicin / Cyclophosphamide / Fluorouracil     Malignant neoplasm of overlapping sites of right female breast   3/21/2024 Initial Diagnosis    Malignant neoplasm of overlapping sites of right female breast     5/2/2024 - 6/21/2024 Chemotherapy    OP BREAST TC DOCEtaxel / Cyclophosphamide     6/28/2024 -  Chemotherapy    OP BREAST FAC DOXOrubicin / Cyclophosphamide / Fluorouracil         HISTORY OF PRESENT ILLNESS:  The patient is a 49 y.o. female, here for follow up on  "management of ER positive right breast cancer currently on adjuvant therapy with CAF after having reaction to TC.  She has had 3 cycles thus far of CAF.  Ashlee continues to tolerate current treatment with no significant side effects.  She was treated since we saw her last for bronchitis and seasonal allergies and is feeling much better.  Continues to have occasional cough.  No fevers or chills.  She is eating well.  She has had no mucositis.    Past Medical History:   Diagnosis Date    Asthma     Breast cancer     COPD (chronic obstructive pulmonary disease)     Depression     history    GERD (gastroesophageal reflux disease)      Past Surgical History:   Procedure Laterality Date    BARIATRIC SURGERY      BREAST SURGERY      Double mastectomy    ENDOSCOPY      EGD x2    GASTRECTOMY      SLEEVE    HEMATOMA EVACUATION TRUNK Bilateral 03/21/2024    Procedure: HEMATOMA EVACUATION TRUNK;  Surgeon: Phuong Loredo MD;  Location: [facility] OR;  Service: General;  Laterality: Bilateral;    LYMPH NODE BIOPSY      MASTECTOMY W/ SENTINEL NODE BIOPSY Bilateral 03/21/2024    Procedure: BREAST MASTECTOMY WITH SENTINEL NODE BIOPSY BILATERAL;  Surgeon: Phuong Loredo MD;  Location: [facility] OR;  Service: General;  Laterality: Bilateral;       No Known Allergies    Family History and Social History reviewed and changed as necessary    REVIEW OF SYSTEM:   No new somatic complaints    PHYSICAL EXAM:  Well-developed, well-nourished appearing female in no distress  Heart tachycardia, regular rhythm  Lungs clear to auscultation bilaterally, respirations regular and unlabored    Vitals:    09/05/24 0933   BP: 137/91   Pulse: 116   Resp: 18   Temp: 97.7 °F (36.5 °C)   SpO2: 96%   Weight: 86.6 kg (191 lb)   Height: 161.3 cm (63.5\")     Vitals:    09/05/24 0933   PainSc: 0-No pain          ECOG score: 0           Vitals reviewed.  Labs reviewed    ECOG: (0) Fully Active - Able to Carry On All Pre-disease Performance Without " Restriction    Lab Results   Component Value Date    HGB 10.9 (L) 09/03/2024    HCT 36.4 09/03/2024    MCV 84 09/03/2024     (H) 09/03/2024    WBC 6.3 09/03/2024    NEUTROABS 3.7 09/03/2024    LYMPHSABS 1.8 09/03/2024    MONOSABS 0.8 09/03/2024    EOSABS 0.0 09/03/2024    BASOSABS 0.1 09/03/2024       Lab Results   Component Value Date    GLUCOSE 92 09/03/2024    BUN 6 09/03/2024    CREATININE 0.84 09/03/2024     09/03/2024    K 4.6 09/03/2024     09/03/2024    CO2 22 09/03/2024    CALCIUM 9.1 09/03/2024    PROTEINTOT 6.6 06/21/2024    ALBUMIN 4.0 09/03/2024    BILITOT <0.2 09/03/2024    ALKPHOS 102 09/03/2024    AST 16 09/03/2024    ALT 9 09/03/2024             ASSESSMENT & PLAN:  1.  Bilateral breast cancer:  -Right side invasive ductal clinical T1c N0, 1.4 centimeter, ER positive AK positive HER2 negative, grade 2.  Pathologic 1.1 cm T1c N0 on mastectomy.  Recurrence score 44.  Distant recurrence risk on hormone blockade alone 32%. > 15% absolute chemotherapeutic benefit.  -Left side invasive ductal clinical T1b N0, 8 MM, ER negative, AK weakly positive, HER2/nabila negative, essentially triple negative grade 3.  Pathologic T1 a N0 with no residual tumor on mastectomy  Initial bilateral mastectomy recommended.    - Negative genetic testing.  -Received 2 cycles of TC and despite heavy premedication could not tolerate with reaction in the chair.  Hence, switch to CAF x 6 on 6/28/2024.  2.  COPD  3.  Dry flaky skin of her hands  4.  Hypertension    Oncology history timeline:  -[facility]11[facility]2024 [facility] showed asymmetry right breast additional views needed with mass in left breast requiring additional imaging  -2/1/2024 bilateral diagnostic mammogram [place] [facility] showed persistent focal asymmetry right 12:00 7 cm from the nipple 7 mm hypoechoic mass indistinct with irregular margins  ultrasound-guided biopsy recommended.  Left breast showed 8 mm 3:00 oval mass 10 cm from the nipple that on the ultrasound was 6 x 5 x 5 mm.  Incidental 4 mm 2:00 hypoechoic mass with internal vascularity 5 cm from the nipple for which ultrasound-guided biopsy recommended  -2/5/2024 right ultrasound-guided core biopsy 7 mm mass with HydroMARK T4 shaped tissue marker placed at the biopsy site.  Left ultrasound breast core biopsy 3:00 6 mm mass and 2:00 4 mm mass with HydroMARK T3 and T4 respectively.  Right breast 12:00 lesion grade 2 invasive ductal Jimenez-Cui 6 out of 9, 4 mm.  % 3+, WV 50% 3+.  Also intermediate grade ductal carcinoma in situ  Left breast 3:00 invasive ductal carcinoma grade 3 Bloom-Cui 9 out of 9, 5-6 mm, ER 0%, WV 25% 1+, HER2/nabila negative 1+  Left breast 2:00 core biopsy fibroadenoma  -2/12/2024 office note Dr. Gregorio Ashton indicates patient with newly diagnosed bilateral breast cancer.  Screening mammogram showed 2 abnormalities in the left breast and 1 in the right.    No personal or family history of breast cancer.  Has hot flashes but not taking estrogen supplements.  -2/13/2024 cervical spine x-ray negative    -2/15/2024 [facility] medical oncology follow-up: Patient has bilateral breast cancers as outlined on routine screening mammogram without any other symptoms.  After her diagnosis, she has had muscle tension aches in the left shoulder which she has a tens unit and recent injection of steroids by her primary care.  This is distinctly in the muscle she says and not in her bones.2/13/2024 cervical spine x-ray has C7 obscured by overlapping bone and soft tissues but otherwise unremarkable. Recent CBC and CMP had normal bone enzymes and was otherwise unremarkable.  Hence I would not make much out of the neck pain in the way of concern for metastatic disease unless this worsens.   She is under a lot of psychological strain from this diagnosis and is quite anxious and I will  refer her to Ashlee Nayak are oncology psychiatrist and we will get our breast cancer navigator working with her.  The 7 mm right breast tumor is grade 2  % 3+, CO 50% 3+, HER2/nabila 0+.  The left breast is clinically 6 mm grade 3 invasive ductal ER negative CO weakly positive HER2/nabila 1 negative essentially triple negative.  We will get MRI of her breasts and if the lesion in the left breast turns out to be larger than 1 cm mammographically or is node positive then we would give neoadjuvant therapy.  If not, both for the 7 mm right breast and the 8 mm left breast tumor I would consider primary resection.  She has just started a job with [employer] in [place] and her insurance changes to [facility] in 2 weeks and she wants her surgery done in the [facility] facility for insurance reasons.  I will discuss this with Dr. Ashton (he was scrubbed when I called today) whose skills I recommended to her but we will make appropriate referrals per her request.  With her triple negativity she will also get genetic counseling.  Absent any other somatic complaints and assuming her neck continues to improve I would do no additional staging imaging in the absence of such symptoms.  We will also present her at our multidisciplinary tumor board once the MRI is completed for final decision making.She is committed to bilateral mammograms regardless of the results of the MRI or genetic testing.  Genetic testing is done on all triple negatives.She does have a history of gastric sleeve but still needs to lose weight.  She also carries a diagnosis of COPD and I am not sure who has managed that and Dr. QUAN may need her cleared by primary care/pulmonary before surgery but I will leave it to him.  She is not oxygen requiring.    -2/21/2024 bilateral breast MRI:  Right breast 12:00, 8 cm from nipple, 1.4 cm enhancing mass consistent with biopsy and no additional right breast lesions.  Left breast 2:00 5 cm from nipple is 8 mm masslike  enhancement with central signal void artifact corresponding to biopsy site.  No additional suspicious sites in the left breast.  No internal mammary nor axillary salazar involvement on either side    -3/1/2024 [facility] medical oncology follow-up: We reviewed her MRI at multidisciplinary tumor board.  For the right breast cancer she would have surgery primarily followed by adjuvant hormone blockade but would not do Oncotype as, for the left breast for which we plan surgery upfront for the 8 mm size of tumor triple negative she will need chemo regardless.  If the left breast lesion ends up less than 1 cm node-negative then we would give her Taxotere Cytoxan x 4.    If the left breast lesion is over a centimeter but less than 2 cm, then I would add Adriamycin.  If the left breast lesion is over 2 cm pathologically or node positive, then I would add carboplatin and Keytruda.  For the right breast cancer I would give her at least 5 and, if she is tolerating, 10 years of hormone blockade.  She had gone 1 year without menses but just started spotting.  I contacted Dr. Ramirez who will see her in [facility] office today for pelvic exam and to get hormone levels and to get ultrasound scheduled.  She has COPD without pulmonary function since prior to her surgery for bariatric surgery in 2019.  She smoked until November.  She is not requiring oxygen and uses her inhalers efficiently without major symptoms.  I spoke with Dr. QUAN and he is comfortable with proceeding without pulmonary function tests and he will get preoperative ABG.  I will see her back in about a month which will give her time to hopefully have healed from her surgery and we can then make plans for adjuvant therapy based upon the above algorithm.  She will need a port but Dr. QUAN does not want to place this during the time of surgery but he will make arrangements to do that postoperatively.    -3/21/2024 evacuation left mastectomy hematomaWith bilateral skin  sparing mastectomies and bilateral sentinel node injection and biopsies.  Left breast mastectomy pathology shows no residual carcinoma and 1 benign sentinel node.  Pathologic T1 a N0 HI weakly positive essentially triple negative  Right breast mastectomy pathology shows 1.1 cm grade 3 invasive ductal carcinoma, 2 benign sentinel nodes pathologic T1c N0 ER/HI positive HER2/nabila negative.  Oncotype score 44 distant recurrence risk at 9 years 32%.  Greater than 15% absolute benefit from chemotherapy.    -4/2/2024 [facility] follow-up: I reviewed the above bilateral mastectomy pathology reports with her.  Wounds are healing well though still with significant healing yet to do and still has drains in place and is due to see Dr. QUAN tomorrow.  Her left breast had no residual tumor and no lymph node involvement for a pathological T1a N0 weakly HI positive essentially triple negative breast cancer for which I have placed orders for Taxotere Cytoxan x 4.  For her right breast mastectomy which is healing well, she had a 1.1 cm grade 3 invasive ductal carcinoma with 2 benign sentinel nodes pathological T1c N0 ER and HI positive HER2/nabila negative for which I will treat her with Arimidex x 10 years following her chemotherapy.  We will get her to Dr. QUAN for port placement and she will have chemo preparation visit in our [place] office and we will start treatment in 2 weeks assuming Dr. QUAN has cleared her surgically.  No need for radiation given bilateral mastectomies.  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    -4/9/2024 chemotherapy education preparation and needs assessment completed    -4/12/2024 Genetic testing was negative for pathogenic mutations in BRCA1/2 and 46 additional genes on the Common Hereditary Cancers panel.      -5/2/2024 treatment began with TC for a planned 4 courses    -5/2/2024 [facility] follow-up: Ashlee is ready to begin adjuvant  therapy with TC today for a planned 4 courses.  Her drains have been removed, she still has sutures in place but her mastectomy scars appear well-healed.  I reviewed labs from yesterday, counts acceptable to continue treatment today.  She is taking her dexamethasone that she started yesterday pretreatment for 3 days.  Once she completes chemotherapy we will plan on Arimidex for 10 years.  Her genetic testing was negative.     - 6/14/2024 [facility] oncology telemedicine follow-up: She received cycle 1 TC on 5/2/2024.  13 minutes into her docetaxel, she reported severe bilateral low back and hip pain flushing and diaphoresis with mild abdominal discomfort and anxiousness.  Reaction medications administered.  Docetaxel restarted half rate and then increase to original rate and the remainder of the infusion well-tolerated.  She went to the emergency room 5/10/2024 with nausea vomiting and weakness lasting the previous 3 days concerned about dehydration.  5/21/2024 hemoglobin 8.3 platelets 844,000 with MCV 79.6 otherwise normal CBC and differential.  Potassium 5.3 normal creatinine otherwise unremarkable CMP.  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  As she has a very high recurrence score, I would try the Taxotere again with institution of all the rescue meds upfront and 0.5 mg IV Ativan premed.  We will do this next week in our [place] office and if she tolerates that we will see her back 3 weeks later for cycle 3 of 4.      -6/21/2024 note: Called to infusion.  Patient had had all the rescue occasions given upfront and despite that had terrible back pain.  Not as excruciating as the first cycle but still too much to handle.  No wheezing or rashes but nonetheless concerning for potential infusion allergic  reaction.  In the literature looking at docetaxel after paclitaxel infusion reactions, there was a 50% chance of reaction suggesting that it is the taxane moiety and not just the vehicle causing the reaction in all cases.  Hence, I am hesitant to use Taxol in place of Taxotere.  To that end, I will get her ejection fraction and assuming it is normal we will try 6 cycles of CAF.  Prozac has a category X potential increase of Adriamycin levels.  She is prescribed this by her primary care.  I will discontinue and substitute Effexor XR 75 mg daily which does not have this interaction.          -6/26/2024 echocardiogram ejection fraction 51-55%.  GLS -22%  CAF PEG filgrastim Pharm.D. education Debo Moran    -6/28/2024 CAF cycle 1/6    -7/25/2024 [facility] medical oncology follow-up: Tolerated cycle 1 of CAF with no side effects.  Now on Effexor instead of Prozac for the category X interaction with Prozac with no problems and feeling fairly fit other than some fatigue.  Continue 2 out of 6 CAF with follow-up 8/9/2024.  See above for subsequent plans    -8/16/2024 [facility] oncology clinic follow-up: Ashlee is doing well on current treatment with CAF with no unusual side effects.  Labs reviewed from 8/14/2024, counts acceptable to continue treatment unchanged.  She has had no further back pain with her treatments.  We will proceed today with cycle 3 of a planned 6 courses.  Since she is tolerating this well we will go ahead and move her back to our [place] location for subsequent treatments.  She will continue using emollient lotions on her hands, and looks like she may have eczema.  Once we have finished treatment recommend she see a dermatologist.  She continues to do well on Effexor after changing from Prozac.    -9/5/2024 [facility] oncology clinic follow-up: Ashlee continues to tolerate current therapy with CAF with no significant side effects.  Labs reviewed from 9/3/2024, counts acceptable to continue treatment today  unchanged with cycle #4 of a planned 6 cycles.  She has been hypertensive on a few occasions looking back through her synopsis, blood pressure today 137/91.  In review of her medication list she was prescribed metoprolol but states that she has not been taking that, she will get with her primary care provider for refill, her heart rate also has been running typically in the 100s-1 teens and we discussed that the metoprolol also will help with her tachycardia.  She will need hormonal blockade therapy once she completes chemotherapy, we plan on Arimidex for at least 5 and up to 10 years of adjuvant therapy as outlined above by Dr. Youngblood.    Return to clinic in 3 weeks for follow-up    I spent 30 minutes caring for Ashlee on this date of service. This time includes time spent by me in the following activities: preparing for the visit, reviewing tests, performing a medically appropriate examination and/or evaluation, counseling and educating the patient/family/caregiver, ordering medications, tests, or procedures, documenting information in the medical record, and independently interpreting results and communicating that information with the patient/family/caregiver.     Danni Campo, APRN    09/05/2024

## 2024-09-24 ENCOUNTER — LAB (OUTPATIENT)
Dept: FAMILY MEDICINE CLINIC | Facility: CLINIC | Age: 49
End: 2024-09-24
Payer: COMMERCIAL

## 2024-09-25 LAB
ALBUMIN SERPL-MCNC: 4.3 G/DL (ref 3.8–4.9)
ALP SERPL-CCNC: 102 IU/L (ref 44–121)
ALT SERPL-CCNC: 8 IU/L (ref 0–32)
AST SERPL-CCNC: 14 IU/L (ref 0–40)
BASOPHILS # BLD AUTO: 0.1 X10E3/UL (ref 0–0.2)
BASOPHILS NFR BLD AUTO: 1 %
BILIRUB SERPL-MCNC: <0.2 MG/DL (ref 0–1.2)
BUN SERPL-MCNC: 13 MG/DL (ref 6–24)
BUN/CREAT SERPL: 15 (ref 9–23)
CALCIUM SERPL-MCNC: 9.6 MG/DL (ref 8.7–10.2)
CHLORIDE SERPL-SCNC: 101 MMOL/L (ref 96–106)
CO2 SERPL-SCNC: 25 MMOL/L (ref 20–29)
CREAT SERPL-MCNC: 0.87 MG/DL (ref 0.57–1)
EGFRCR SERPLBLD CKD-EPI 2021: 81 ML/MIN/1.73
EOSINOPHIL # BLD AUTO: 0 X10E3/UL (ref 0–0.4)
EOSINOPHIL NFR BLD AUTO: 0 %
ERYTHROCYTE [DISTWIDTH] IN BLOOD BY AUTOMATED COUNT: 23.8 % (ref 11.7–15.4)
GLOBULIN SER CALC-MCNC: 2.4 G/DL (ref 1.5–4.5)
GLUCOSE SERPL-MCNC: 83 MG/DL (ref 70–99)
HCT VFR BLD AUTO: 36 % (ref 34–46.6)
HGB BLD-MCNC: 10.8 G/DL (ref 11.1–15.9)
IMM GRANULOCYTES # BLD AUTO: 0 X10E3/UL (ref 0–0.1)
IMM GRANULOCYTES NFR BLD AUTO: 1 %
LYMPHOCYTES # BLD AUTO: 1.6 X10E3/UL (ref 0.7–3.1)
LYMPHOCYTES NFR BLD AUTO: 26 %
MCH RBC QN AUTO: 26 PG (ref 26.6–33)
MCHC RBC AUTO-ENTMCNC: 30 G/DL (ref 31.5–35.7)
MCV RBC AUTO: 87 FL (ref 79–97)
MONOCYTES # BLD AUTO: 0.7 X10E3/UL (ref 0.1–0.9)
MONOCYTES NFR BLD AUTO: 12 %
MORPHOLOGY BLD-IMP: ABNORMAL
NEUTROPHILS # BLD AUTO: 3.7 X10E3/UL (ref 1.4–7)
NEUTROPHILS NFR BLD AUTO: 60 %
PLATELET # BLD AUTO: 651 X10E3/UL (ref 150–450)
POTASSIUM SERPL-SCNC: 5.2 MMOL/L (ref 3.5–5.2)
PROT SERPL-MCNC: 6.7 G/DL (ref 6–8.5)
RBC # BLD AUTO: 4.16 X10E6/UL (ref 3.77–5.28)
SODIUM SERPL-SCNC: 141 MMOL/L (ref 134–144)
WBC # BLD AUTO: 6.2 X10E3/UL (ref 3.4–10.8)

## 2024-09-26 ENCOUNTER — OFFICE VISIT (OUTPATIENT)
Dept: ONCOLOGY | Facility: CLINIC | Age: 49
End: 2024-09-26
Payer: COMMERCIAL

## 2024-09-26 ENCOUNTER — INFUSION (OUTPATIENT)
Dept: ONCOLOGY | Facility: HOSPITAL | Age: 49
End: 2024-09-26
Payer: COMMERCIAL

## 2024-09-26 VITALS
OXYGEN SATURATION: 96 % | SYSTOLIC BLOOD PRESSURE: 124 MMHG | RESPIRATION RATE: 18 BRPM | HEART RATE: 106 BPM | HEIGHT: 64 IN | DIASTOLIC BLOOD PRESSURE: 86 MMHG | BODY MASS INDEX: 33.12 KG/M2 | WEIGHT: 194 LBS | TEMPERATURE: 97.8 F

## 2024-09-26 DIAGNOSIS — Z17.1 MALIGNANT NEOPLASM OF OVERLAPPING SITES OF RIGHT BREAST IN FEMALE, ESTROGEN RECEPTOR NEGATIVE: ICD-10-CM

## 2024-09-26 DIAGNOSIS — Z17.0 MALIGNANT NEOPLASM OF OVERLAPPING SITES OF BOTH BREASTS IN FEMALE, ESTROGEN RECEPTOR POSITIVE: Primary | ICD-10-CM

## 2024-09-26 DIAGNOSIS — C50.811 MALIGNANT NEOPLASM OF OVERLAPPING SITES OF BOTH BREASTS IN FEMALE, ESTROGEN RECEPTOR POSITIVE: Primary | ICD-10-CM

## 2024-09-26 DIAGNOSIS — C50.812 MALIGNANT NEOPLASM OF OVERLAPPING SITES OF BOTH BREASTS IN FEMALE, ESTROGEN RECEPTOR POSITIVE: Primary | ICD-10-CM

## 2024-09-26 DIAGNOSIS — C50.811 MALIGNANT NEOPLASM OF OVERLAPPING SITES OF RIGHT BREAST IN FEMALE, ESTROGEN RECEPTOR NEGATIVE: ICD-10-CM

## 2024-09-26 PROCEDURE — 25010000002 PALONOSETRON PER 25 MCG: Performed by: NURSE PRACTITIONER

## 2024-09-26 PROCEDURE — 96411 CHEMO IV PUSH ADDL DRUG: CPT

## 2024-09-26 PROCEDURE — 25010000002 PEGFILGRASTIM 6 MG/0.6ML PREFILLED SYRINGE KIT: Performed by: NURSE PRACTITIONER

## 2024-09-26 PROCEDURE — 96377 APPLICATON ON-BODY INJECTOR: CPT

## 2024-09-26 PROCEDURE — 25010000002 CYCLOPHOSPHAMIDE 2 GM/10ML SOLUTION 10 ML VIAL: Performed by: NURSE PRACTITIONER

## 2024-09-26 PROCEDURE — 96368 THER/DIAG CONCURRENT INF: CPT

## 2024-09-26 PROCEDURE — 25010000002 FLUOROURACIL PER 500 MG: Performed by: NURSE PRACTITIONER

## 2024-09-26 PROCEDURE — 25810000003 SODIUM CHLORIDE 0.9 % SOLUTION: Performed by: NURSE PRACTITIONER

## 2024-09-26 PROCEDURE — 99214 OFFICE O/P EST MOD 30 MIN: CPT | Performed by: NURSE PRACTITIONER

## 2024-09-26 PROCEDURE — 25010000002 FOSAPREPITANT PER 1 MG: Performed by: NURSE PRACTITIONER

## 2024-09-26 PROCEDURE — 96375 TX/PRO/DX INJ NEW DRUG ADDON: CPT

## 2024-09-26 PROCEDURE — 25010000002 HEPARIN LOCK FLUSH PER 10 UNITS: Performed by: INTERNAL MEDICINE

## 2024-09-26 PROCEDURE — 25010000002 DEXAMETHASONE SODIUM PHOSPHATE 100 MG/10ML SOLUTION: Performed by: NURSE PRACTITIONER

## 2024-09-26 PROCEDURE — 96367 TX/PROPH/DG ADDL SEQ IV INF: CPT

## 2024-09-26 PROCEDURE — 25810000003 SODIUM CHLORIDE 0.9 % SOLUTION 250 ML FLEX CONT: Performed by: NURSE PRACTITIONER

## 2024-09-26 PROCEDURE — 96413 CHEMO IV INFUSION 1 HR: CPT

## 2024-09-26 PROCEDURE — 25010000002 DOXORUBICIN PER 10 MG: Performed by: NURSE PRACTITIONER

## 2024-09-26 PROCEDURE — 96417 CHEMO IV INFUS EACH ADDL SEQ: CPT

## 2024-09-26 RX ORDER — FLUOROURACIL 50 MG/ML
1000 INJECTION, SOLUTION INTRAVENOUS ONCE
Status: COMPLETED | OUTPATIENT
Start: 2024-09-26 | End: 2024-09-26

## 2024-09-26 RX ORDER — HEPARIN SODIUM (PORCINE) LOCK FLUSH IV SOLN 100 UNIT/ML 100 UNIT/ML
500 SOLUTION INTRAVENOUS AS NEEDED
OUTPATIENT
Start: 2024-09-26

## 2024-09-26 RX ORDER — PALONOSETRON 0.05 MG/ML
0.25 INJECTION, SOLUTION INTRAVENOUS ONCE
Status: COMPLETED | OUTPATIENT
Start: 2024-09-26 | End: 2024-09-26

## 2024-09-26 RX ORDER — FLUOROURACIL 50 MG/ML
500 INJECTION, SOLUTION INTRAVENOUS ONCE
Status: CANCELLED | OUTPATIENT
Start: 2024-09-26

## 2024-09-26 RX ORDER — SODIUM CHLORIDE 0.9 % (FLUSH) 0.9 %
20 SYRINGE (ML) INJECTION AS NEEDED
OUTPATIENT
Start: 2024-09-26

## 2024-09-26 RX ORDER — DOXORUBICIN HYDROCHLORIDE 2 MG/ML
50 INJECTION, SOLUTION INTRAVENOUS ONCE
Status: CANCELLED | OUTPATIENT
Start: 2024-09-26

## 2024-09-26 RX ORDER — SODIUM CHLORIDE 9 MG/ML
20 INJECTION, SOLUTION INTRAVENOUS ONCE
Status: COMPLETED | OUTPATIENT
Start: 2024-09-26 | End: 2024-09-26

## 2024-09-26 RX ORDER — PALONOSETRON 0.05 MG/ML
0.25 INJECTION, SOLUTION INTRAVENOUS ONCE
Status: CANCELLED | OUTPATIENT
Start: 2024-09-26

## 2024-09-26 RX ORDER — DOXORUBICIN HYDROCHLORIDE 2 MG/ML
50 INJECTION, SOLUTION INTRAVENOUS ONCE
Status: COMPLETED | OUTPATIENT
Start: 2024-09-26 | End: 2024-09-26

## 2024-09-26 RX ORDER — SODIUM CHLORIDE 9 MG/ML
20 INJECTION, SOLUTION INTRAVENOUS ONCE
Status: CANCELLED | OUTPATIENT
Start: 2024-09-26

## 2024-09-26 RX ORDER — HEPARIN SODIUM (PORCINE) LOCK FLUSH IV SOLN 100 UNIT/ML 100 UNIT/ML
500 SOLUTION INTRAVENOUS AS NEEDED
Status: DISCONTINUED | OUTPATIENT
Start: 2024-09-26 | End: 2024-09-26 | Stop reason: HOSPADM

## 2024-09-26 RX ADMIN — FOSAPREPITANT 150 MG: 150 INJECTION, POWDER, LYOPHILIZED, FOR SOLUTION INTRAVENOUS at 10:21

## 2024-09-26 RX ADMIN — DOXORUBICIN HYDROCHLORIDE 48 MG: 2 INJECTION, SOLUTION INTRAVENOUS at 10:55

## 2024-09-26 RX ADMIN — PALONOSETRON HYDROCHLORIDE 0.25 MG: 0.25 INJECTION INTRAVENOUS at 10:20

## 2024-09-26 RX ADMIN — FLUOROURACIL 1000 MG: 50 INJECTION, SOLUTION INTRAVENOUS at 11:41

## 2024-09-26 RX ADMIN — PEGFILGRASTIM 6 MG: KIT SUBCUTANEOUS at 11:44

## 2024-09-26 RX ADMIN — DEXAMETHASONE SODIUM PHOSPHATE 12 MG: 10 INJECTION, SOLUTION INTRAMUSCULAR; INTRAVENOUS at 10:21

## 2024-09-26 RX ADMIN — HEPARIN 500 UNITS: 100 SYRINGE at 11:52

## 2024-09-26 RX ADMIN — SODIUM CHLORIDE 20 ML/HR: 9 INJECTION, SOLUTION INTRAVENOUS at 10:20

## 2024-09-26 RX ADMIN — CYCLOPHOSPHAMIDE 960 MG: 2 INJECTION INTRAVENOUS at 11:03

## 2024-10-15 ENCOUNTER — LAB (OUTPATIENT)
Dept: FAMILY MEDICINE CLINIC | Facility: CLINIC | Age: 49
End: 2024-10-15
Payer: COMMERCIAL

## 2024-10-16 LAB
ALBUMIN SERPL-MCNC: 4.4 G/DL (ref 3.8–4.9)
ALP SERPL-CCNC: 100 IU/L (ref 44–121)
ALT SERPL-CCNC: 8 IU/L (ref 0–32)
AMBIG ABBREV CMP14 DEFAULT: NORMAL
AST SERPL-CCNC: 16 IU/L (ref 0–40)
BASOPHILS # BLD AUTO: 0.1 X10E3/UL (ref 0–0.2)
BASOPHILS NFR BLD AUTO: 1 %
BILIRUB SERPL-MCNC: <0.2 MG/DL (ref 0–1.2)
BUN SERPL-MCNC: 8 MG/DL (ref 6–24)
BUN/CREAT SERPL: 9 (ref 9–23)
CALCIUM SERPL-MCNC: 10.1 MG/DL (ref 8.7–10.2)
CHLORIDE SERPL-SCNC: 102 MMOL/L (ref 96–106)
CO2 SERPL-SCNC: 24 MMOL/L (ref 20–29)
CREAT SERPL-MCNC: 0.94 MG/DL (ref 0.57–1)
EGFRCR SERPLBLD CKD-EPI 2021: 73 ML/MIN/1.73
EOSINOPHIL # BLD AUTO: 0 X10E3/UL (ref 0–0.4)
EOSINOPHIL NFR BLD AUTO: 0 %
ERYTHROCYTE [DISTWIDTH] IN BLOOD BY AUTOMATED COUNT: 21.9 % (ref 11.7–15.4)
GLOBULIN SER CALC-MCNC: 2.2 G/DL (ref 1.5–4.5)
GLUCOSE SERPL-MCNC: 92 MG/DL (ref 70–99)
HCT VFR BLD AUTO: 35 % (ref 34–46.6)
HGB BLD-MCNC: 10.5 G/DL (ref 11.1–15.9)
IMM GRANULOCYTES # BLD AUTO: 0 X10E3/UL (ref 0–0.1)
IMM GRANULOCYTES NFR BLD AUTO: 0 %
LYMPHOCYTES # BLD AUTO: 1.7 X10E3/UL (ref 0.7–3.1)
LYMPHOCYTES NFR BLD AUTO: 30 %
MCH RBC QN AUTO: 26.3 PG (ref 26.6–33)
MCHC RBC AUTO-ENTMCNC: 30 G/DL (ref 31.5–35.7)
MCV RBC AUTO: 88 FL (ref 79–97)
MONOCYTES # BLD AUTO: 0.8 X10E3/UL (ref 0.1–0.9)
MONOCYTES NFR BLD AUTO: 14 %
NEUTROPHILS # BLD AUTO: 3 X10E3/UL (ref 1.4–7)
NEUTROPHILS NFR BLD AUTO: 55 %
PLATELET # BLD AUTO: 624 X10E3/UL (ref 150–450)
POTASSIUM SERPL-SCNC: 5.2 MMOL/L (ref 3.5–5.2)
PROT SERPL-MCNC: 6.6 G/DL (ref 6–8.5)
RBC # BLD AUTO: 3.99 X10E6/UL (ref 3.77–5.28)
SODIUM SERPL-SCNC: 140 MMOL/L (ref 134–144)
WBC # BLD AUTO: 5.6 X10E3/UL (ref 3.4–10.8)

## 2024-10-17 ENCOUNTER — INFUSION (OUTPATIENT)
Dept: ONCOLOGY | Facility: HOSPITAL | Age: 49
End: 2024-10-17
Payer: COMMERCIAL

## 2024-10-17 ENCOUNTER — OFFICE VISIT (OUTPATIENT)
Dept: ONCOLOGY | Facility: CLINIC | Age: 49
End: 2024-10-17
Payer: COMMERCIAL

## 2024-10-17 VITALS
HEIGHT: 64 IN | BODY MASS INDEX: 32.1 KG/M2 | TEMPERATURE: 97.5 F | OXYGEN SATURATION: 96 % | DIASTOLIC BLOOD PRESSURE: 85 MMHG | WEIGHT: 188 LBS | RESPIRATION RATE: 18 BRPM | HEART RATE: 133 BPM | SYSTOLIC BLOOD PRESSURE: 122 MMHG

## 2024-10-17 DIAGNOSIS — Z17.0 MALIGNANT NEOPLASM OF OVERLAPPING SITES OF BOTH BREASTS IN FEMALE, ESTROGEN RECEPTOR POSITIVE: Primary | Chronic | ICD-10-CM

## 2024-10-17 DIAGNOSIS — C50.811 MALIGNANT NEOPLASM OF OVERLAPPING SITES OF BOTH BREASTS IN FEMALE, ESTROGEN RECEPTOR POSITIVE: Primary | ICD-10-CM

## 2024-10-17 DIAGNOSIS — C50.811 MALIGNANT NEOPLASM OF OVERLAPPING SITES OF RIGHT BREAST IN FEMALE, ESTROGEN RECEPTOR NEGATIVE: ICD-10-CM

## 2024-10-17 DIAGNOSIS — Z17.0 MALIGNANT NEOPLASM OF OVERLAPPING SITES OF BOTH BREASTS IN FEMALE, ESTROGEN RECEPTOR POSITIVE: Primary | ICD-10-CM

## 2024-10-17 DIAGNOSIS — Z17.1 MALIGNANT NEOPLASM OF OVERLAPPING SITES OF RIGHT BREAST IN FEMALE, ESTROGEN RECEPTOR NEGATIVE: ICD-10-CM

## 2024-10-17 DIAGNOSIS — C50.812 MALIGNANT NEOPLASM OF OVERLAPPING SITES OF BOTH BREASTS IN FEMALE, ESTROGEN RECEPTOR POSITIVE: Primary | Chronic | ICD-10-CM

## 2024-10-17 DIAGNOSIS — C50.811 MALIGNANT NEOPLASM OF OVERLAPPING SITES OF BOTH BREASTS IN FEMALE, ESTROGEN RECEPTOR POSITIVE: Primary | Chronic | ICD-10-CM

## 2024-10-17 DIAGNOSIS — C50.812 MALIGNANT NEOPLASM OF OVERLAPPING SITES OF BOTH BREASTS IN FEMALE, ESTROGEN RECEPTOR POSITIVE: Primary | ICD-10-CM

## 2024-10-17 PROCEDURE — 96411 CHEMO IV PUSH ADDL DRUG: CPT

## 2024-10-17 PROCEDURE — 25810000003 SODIUM CHLORIDE 0.9 % SOLUTION: Performed by: NURSE PRACTITIONER

## 2024-10-17 PROCEDURE — 96375 TX/PRO/DX INJ NEW DRUG ADDON: CPT

## 2024-10-17 PROCEDURE — 25010000002 FOSAPREPITANT PER 1 MG: Performed by: NURSE PRACTITIONER

## 2024-10-17 PROCEDURE — 96376 TX/PRO/DX INJ SAME DRUG ADON: CPT

## 2024-10-17 PROCEDURE — 25010000002 DEXAMETHASONE SODIUM PHOSPHATE 100 MG/10ML SOLUTION: Performed by: NURSE PRACTITIONER

## 2024-10-17 PROCEDURE — 96413 CHEMO IV INFUSION 1 HR: CPT

## 2024-10-17 PROCEDURE — 25010000002 FLUOROURACIL PER 500 MG: Performed by: NURSE PRACTITIONER

## 2024-10-17 PROCEDURE — 25010000002 PEGFILGRASTIM 6 MG/0.6ML PREFILLED SYRINGE KIT: Performed by: NURSE PRACTITIONER

## 2024-10-17 PROCEDURE — 96377 APPLICATON ON-BODY INJECTOR: CPT

## 2024-10-17 PROCEDURE — 25810000003 SODIUM CHLORIDE 0.9 % SOLUTION 250 ML FLEX CONT: Performed by: NURSE PRACTITIONER

## 2024-10-17 PROCEDURE — 25010000002 PALONOSETRON PER 25 MCG: Performed by: NURSE PRACTITIONER

## 2024-10-17 PROCEDURE — 25010000002 DOXORUBICIN PER 10 MG: Performed by: NURSE PRACTITIONER

## 2024-10-17 PROCEDURE — 96367 TX/PROPH/DG ADDL SEQ IV INF: CPT

## 2024-10-17 PROCEDURE — 25010000002 HEPARIN LOCK FLUSH PER 10 UNITS: Performed by: INTERNAL MEDICINE

## 2024-10-17 PROCEDURE — 25010000002 CYCLOPHOSPHAMIDE 2 GM/10ML SOLUTION 10 ML VIAL: Performed by: NURSE PRACTITIONER

## 2024-10-17 PROCEDURE — 99214 OFFICE O/P EST MOD 30 MIN: CPT | Performed by: NURSE PRACTITIONER

## 2024-10-17 RX ORDER — DOXORUBICIN HYDROCHLORIDE 2 MG/ML
50 INJECTION, SOLUTION INTRAVENOUS ONCE
Status: COMPLETED | OUTPATIENT
Start: 2024-10-17 | End: 2024-10-17

## 2024-10-17 RX ORDER — HEPARIN SODIUM (PORCINE) LOCK FLUSH IV SOLN 100 UNIT/ML 100 UNIT/ML
500 SOLUTION INTRAVENOUS AS NEEDED
OUTPATIENT
Start: 2024-10-17

## 2024-10-17 RX ORDER — SODIUM CHLORIDE 9 MG/ML
20 INJECTION, SOLUTION INTRAVENOUS ONCE
Status: COMPLETED | OUTPATIENT
Start: 2024-10-17 | End: 2024-10-17

## 2024-10-17 RX ORDER — PALONOSETRON 0.05 MG/ML
0.25 INJECTION, SOLUTION INTRAVENOUS ONCE
Status: COMPLETED | OUTPATIENT
Start: 2024-10-17 | End: 2024-10-17

## 2024-10-17 RX ORDER — SODIUM CHLORIDE 9 MG/ML
20 INJECTION, SOLUTION INTRAVENOUS ONCE
Status: CANCELLED | OUTPATIENT
Start: 2024-10-17

## 2024-10-17 RX ORDER — DOXORUBICIN HYDROCHLORIDE 2 MG/ML
50 INJECTION, SOLUTION INTRAVENOUS ONCE
Status: CANCELLED | OUTPATIENT
Start: 2024-10-17

## 2024-10-17 RX ORDER — FLUOROURACIL 50 MG/ML
1000 INJECTION, SOLUTION INTRAVENOUS ONCE
Status: COMPLETED | OUTPATIENT
Start: 2024-10-17 | End: 2024-10-17

## 2024-10-17 RX ORDER — SODIUM CHLORIDE 0.9 % (FLUSH) 0.9 %
20 SYRINGE (ML) INJECTION AS NEEDED
OUTPATIENT
Start: 2024-10-17

## 2024-10-17 RX ORDER — HEPARIN SODIUM (PORCINE) LOCK FLUSH IV SOLN 100 UNIT/ML 100 UNIT/ML
500 SOLUTION INTRAVENOUS AS NEEDED
Status: DISCONTINUED | OUTPATIENT
Start: 2024-10-17 | End: 2024-10-17 | Stop reason: HOSPADM

## 2024-10-17 RX ORDER — PALONOSETRON 0.05 MG/ML
0.25 INJECTION, SOLUTION INTRAVENOUS ONCE
Status: CANCELLED | OUTPATIENT
Start: 2024-10-17

## 2024-10-17 RX ORDER — FLUOROURACIL 50 MG/ML
500 INJECTION, SOLUTION INTRAVENOUS ONCE
Status: CANCELLED | OUTPATIENT
Start: 2024-10-17

## 2024-10-17 RX ADMIN — DEXAMETHASONE SODIUM PHOSPHATE 12 MG: 10 INJECTION, SOLUTION INTRAMUSCULAR; INTRAVENOUS at 10:18

## 2024-10-17 RX ADMIN — CYCLOPHOSPHAMIDE 960 MG: 2 INJECTION INTRAVENOUS at 11:16

## 2024-10-17 RX ADMIN — PALONOSETRON HYDROCHLORIDE 0.25 MG: 0.25 INJECTION INTRAVENOUS at 10:15

## 2024-10-17 RX ADMIN — HEPARIN 500 UNITS: 100 SYRINGE at 12:00

## 2024-10-17 RX ADMIN — FOSAPREPITANT 150 MG: 150 INJECTION, POWDER, LYOPHILIZED, FOR SOLUTION INTRAVENOUS at 10:18

## 2024-10-17 RX ADMIN — PEGFILGRASTIM 6 MG: KIT SUBCUTANEOUS at 12:03

## 2024-10-17 RX ADMIN — SODIUM CHLORIDE 20 ML/HR: 9 INJECTION, SOLUTION INTRAVENOUS at 10:15

## 2024-10-17 RX ADMIN — FLUOROURACIL 1000 MG: 50 INJECTION, SOLUTION INTRAVENOUS at 11:54

## 2024-10-17 RX ADMIN — DOXORUBICIN HYDROCHLORIDE 48 MG: 2 INJECTION, SOLUTION INTRAVENOUS at 11:06

## 2024-10-17 NOTE — PROGRESS NOTES
CHIEF COMPLAINT: Breast cancer    Problem List:  Oncology/Hematology History Overview Note   1.  Bilateral breast cancer:  -Right side invasive ductal clinical T1c N0, 1.4 centimeter, ER positive MA positive HER2 negative, grade 2.  Pathologic 1.1 cm T1c N0 on mastectomy.  Recurrence score 44.  Distant recurrence risk on hormone blockade alone 32%. > 15% absolute chemotherapeutic benefit.  -Left side invasive ductal clinical T1b N0, 8 MM, ER negative, MA weakly positive, HER2/nabila negative, essentially triple negative grade 3.  Pathologic T1 a N0 with no residual tumor on mastectomy  Initial bilateral mastectomy recommended.    - Negative genetic testing.  -Received 2 cycles of TC and despite heavy premedication could not tolerate with reaction in the chair.  Hence, switch to CAF x 6 on 6/28/2024.  2.  COPD    Oncology history timeline:  -1/11/2024 bilateral screening [facility] imaging showed asymmetry right breast additional views needed with mass in left breast requiring additional imaging  -2/1/2024 bilateral diagnostic mammogram [facility] showed persistent focal asymmetry right 12:00 7 cm from the nipple 7 mm hypoechoic mass indistinct with irregular margins ultrasound-guided biopsy recommended.  Left breast showed 8 mm 3:00 oval mass 10 cm from the nipple that on the ultrasound was 6 x 5 x 5 mm.  Incidental 4 mm 2:00 hypoechoic mass with internal vascularity 5 cm from the nipple for which ultrasound-guided biopsy recommended  -2/5/2024 right ultrasound-guided core biopsy 7 mm mass with HydroMARK T4 shaped tissue marker placed at the biopsy site.  Left ultrasound breast core biopsy 3:00 6 mm mass and 2:00 4 mm mass with HydroMARK T3 and T4 respectively.  Right breast 12:00 lesion grade 2 invasive ductal Jimenez-Cui 6 out of 9, 4 mm.  % 3+, MA 50% 3+.  Also intermediate grade ductal carcinoma in situ  Left breast  3:00 invasive ductal carcinoma grade 3 Bloom-Cui 9 out of 9, 5-6 mm, ER 0%, ID 25% 1+, HER2/nabila negative 1+  Left breast 2:00 core biopsy fibroadenoma  -2/12/2024 office note Dr. Gregorio Ashton indicates patient with newly diagnosed bilateral breast cancer.  Screening mammogram showed 2 abnormalities in the left breast and 1 in the right.    No personal or family history of breast cancer.  Has hot flashes but not taking estrogen supplements.  -2/13/2024 cervical spine x-ray negative    -2/15/2024 [facility] medical oncology follow-up: Patient has bilateral breast cancers as outlined on routine screening mammogram without any other symptoms.  After her diagnosis, she has had muscle tension aches in the left shoulder which she has a tens unit and recent injection of steroids by her primary care.  This is distinctly in the muscle she says and not in her bones.2/13/2024 cervical spine x-ray has C7 obscured by overlapping bone and soft tissues but otherwise unremarkable. Recent CBC and CMP had normal bone enzymes and was otherwise unremarkable.  Hence I would not make much out of the neck pain in the way of concern for metastatic disease unless this worsens.   She is under a lot of psychological strain from this diagnosis and is quite anxious and I will refer her to Ashlee Nayak are oncology psychiatrist and we will get our breast cancer navigator working with her.  The 7 mm right breast tumor is grade 2  % 3+, ID 50% 3+, HER2/nabila 0+.  The left breast is clinically 6 mm grade 3 invasive ductal ER negative ID weakly positive HER2/nabila 1 negative essentially triple negative.  We will get MRI of her breasts and if the lesion in the left breast turns out to be larger than 1 cm mammographically or is node positive then we would give neoadjuvant therapy.  If not, both for the 7 mm right breast and the 8 mm left breast tumor I would consider primary resection.  She has just started a job with [employer] in [place] and her  insurance changes to [facility] in 2 weeks and she wants her surgery done in the [facility] facility for insurance reasons.  I will discuss this with Dr. Ashton (he was scrubbed when I called today) whose skills I recommended to her but we will make appropriate referrals per her request.  With her triple negativity she will also get genetic counseling.  Absent any other somatic complaints and assuming her neck continues to improve I would do no additional staging imaging in the absence of such symptoms.  We will also present her at our multidisciplinary tumor board once the MRI is completed for final decision making.She is committed to bilateral mammograms regardless of the results of the MRI or genetic testing.  Genetic testing is done on all triple negatives.She does have a history of gastric sleeve but still needs to lose weight.  She also carries a diagnosis of COPD and I am not sure who has managed that and Dr. QUAN may need her cleared by primary care/pulmonary before surgery but I will leave it to him.  She is not oxygen requiring.    -2/21/2024 bilateral breast MRI:  Right breast 12:00, 8 cm from nipple, 1.4 cm enhancing mass consistent with biopsy and no additional right breast lesions.  Left breast 2:00 5 cm from nipple is 8 mm masslike enhancement with central signal void artifact corresponding to biopsy site.  No additional suspicious sites in the left breast.  No internal mammary nor axillary salazar involvement on either side    -3/1/2024 [facility] medical oncology follow-up: We reviewed her MRI at multidisciplinary tumor board.  For the right breast cancer she would have surgery primarily followed by adjuvant hormone blockade but would not do Oncotype as, for the left breast for which we plan surgery upfront for the 8 mm size of tumor triple negative she will need chemo regardless.  If the left breast lesion ends up less than 1 cm node-negative then we would give her Taxotere Cytoxan x 4.    If the left breast  lesion is over a centimeter but less than 2 cm, then I would add Adriamycin.  If the left breast lesion is over 2 cm pathologically or node positive, then I would add carboplatin and Keytruda.  For the right breast cancer I would give her at least 5 and, if she is tolerating, 10 years of hormone blockade.  She had gone 1 year without menses but just started spotting.  I contacted Dr. Ramirez who will see her in [facility] office today for pelvic exam and to get hormone levels and to get ultrasound scheduled.  She has COPD without pulmonary function since prior to her surgery for bariatric surgery in 2019.  She smoked until November.  She is not requiring oxygen and uses her inhalers efficiently without major symptoms.  I spoke with Dr. QUAN and he is comfortable with proceeding without pulmonary function tests and he will get preoperative ABG.  I will see her back in about a month which will give her time to hopefully have healed from her surgery and we can then make plans for adjuvant therapy based upon the above algorithm.  She will need a port but Dr. QUAN does not want to place this during the time of surgery but he will make arrangements to do that postoperatively.    -3/21/2024 evacuation left mastectomy hematomaWith bilateral skin sparing mastectomies and bilateral sentinel node injection and biopsies.  Left breast mastectomy pathology shows no residual carcinoma and 1 benign sentinel node.  Pathologic T1 a N0 [place] weakly positive essentially triple negative  Right breast mastectomy pathology shows 1.1 cm grade 3 invasive ductal carcinoma, 2 benign sentinel nodes pathologic T1c N0 ER/[place] positive HER2/nabila negative.  Oncotype score 44 distant recurrence risk at 9 years 32%.  Greater than 15% absolute benefit from chemotherapy.    -4/2/2024 [facility] medical oncology follow-up: I reviewed the above bilateral mastectomy pathology reports with her.  Wounds are healing well though still with significant healing yet to  do and still has drains in place and is due to see Dr. QUAN tomorrow.  Her left breast had no residual tumor and no lymph node involvement for a pathological T1a N0 weakly WI positive essentially triple negative breast cancer for which I have placed orders for Taxotere Cytoxan x 4.  For her right breast mastectomy which is healing well, she had a 1.1 cm grade 3 invasive ductal carcinoma with 2 benign sentinel nodes pathological T1c N0 ER and WI positive HER2/nabila negative for which I will treat her with Arimidex x 10 years following her chemotherapy.  We will get her to Dr. QUAN for port placement and she will have chemo preparation visit in our [place] office and we will start treatment in 2 weeks assuming Dr. QUAN has cleared her surgically.  No need for radiation given bilateral mastectomies.  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    -4/9/2024 chemotherapy education preparation and needs assessment completed    -4/12/2024 Genetic testing was negative for pathogenic mutations in BRCA1/2 and 46 additional genes on the Common Hereditary Cancers panel.      -5/2/2024 treatment began with TC for a planned 4 courses    -5/2/2024 [facility] oncology clinic follow-up: Ashlee is ready to begin adjuvant therapy with TC today for a planned 4 courses.  Her drains have been removed, she still has sutures in place but her mastectomy scars appear well-healed.  I reviewed labs from yesterday, counts acceptable to continue treatment today.  She is taking her dexamethasone that she started yesterday pretreatment for 3 days.  Once she completes chemotherapy we will plan on Arimidex for 10 years.  Her genetic testing was negative.     - 6/14/2024 [facility] medical oncology telemedicine follow-up: She received cycle 1 TC on 5/2/2024.  13 minutes into her docetaxel, she reported severe bilateral low back and hip pain flushing and diaphoresis with mild abdominal discomfort and anxiousness.  Reaction  medications administered.  Docetaxel restarted half rate and then increase to original rate and the remainder of the infusion well-tolerated.  She went to the emergency room 5/10/2024 with nausea vomiting and weakness lasting the previous 3 days concerned about dehydration.  5/21/2024 hemoglobin 8.3 platelets 844,000 with MCV 79.6 otherwise normal CBC and differential.  Potassium 5.3 normal creatinine otherwise unremarkable CMP.  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  As she has a very high recurrence score, I would try the Taxotere again with institution of all the rescue meds upfront and 0.5 mg IV Ativan premed.  We will do this next week in our [place] office and if she tolerates that we will see her back 3 weeks later for cycle 3 of 4.      -6/21/2024 note: Called to infusion.  Patient had had all the rescue occasions given upfront and despite that had terrible back pain.  Not as excruciating as the first cycle but still too much to handle.  No wheezing or rashes but nonetheless concerning for potential infusion allergic reaction.  In the literature looking at docetaxel after paclitaxel infusion reactions, there was a 50% chance of reaction suggesting that it is the taxane moiety and not just the vehicle causing the reaction in all cases.  Hence, I am hesitant to use Taxol in place of Taxotere.  To that end, I will get her ejection fraction and assuming it is normal we will try 6 cycles of CAF.  Prozac has a category X potential increase of Adriamycin levels.  She is prescribed this by her primary care.  I will discontinue and substitute Effexor XR 75 mg daily which does not have this interaction.          -6/26/2024 echocardiogram ejection fraction 51-55%.  GLS -22%  CAF PEG filgrastim Pharm.D. education  Debo Moran    -6/28/2024 CAF cycle 1/6    -7/25/2024 [facility] medical oncology follow-up: Tolerated cycle 1 of CAF with no side effects.  Now on Effexor instead of Prozac for the category X interaction with Prozac with no problems and feeling fairly fit other than some fatigue.  Continue 2 out of 6 CAF with follow-up 8/9/2024.  See above for subsequent plans    -8/16/2024 [facility] oncology clinic follow-up: Ashlee is doing well on current treatment with CAF with no unusual side effects.  Labs reviewed from 8/14/2024, counts acceptable to continue treatment unchanged.  She has had no further back pain with her treatments.  We will proceed today with cycle 3 of a planned 6 courses.  Since she is tolerating this well we will go ahead and move her back to our [place] location for subsequent treatments.  She will continue using emollient lotions on her hands, and looks like she may have eczema.  Once we have finished treatment recommend she see a dermatologist.  She continues to do well on Effexor after changing from Prozac.     Malignant neoplasm of overlapping sites of both breasts in female, estrogen receptor positive   5/1/2024 -  Chemotherapy    OP CENTRAL VENOUS ACCESS DEVICE Access, Care, and Maintenance (CVAD)     5/2/2024 - 6/21/2024 Chemotherapy    OP BREAST TC DOCEtaxel / Cyclophosphamide     6/21/2024 -  Chemotherapy    OP CVAD Occlusion - Alteplase     6/28/2024 -  Chemotherapy    OP BREAST FAC DOXOrubicin / Cyclophosphamide / Fluorouracil     Malignant neoplasm of overlapping sites of right female breast   3/21/2024 Initial Diagnosis    Malignant neoplasm of overlapping sites of right female breast     5/2/2024 - 6/21/2024 Chemotherapy    OP BREAST TC DOCEtaxel / Cyclophosphamide     6/28/2024 -  Chemotherapy    OP BREAST FAC DOXOrubicin / Cyclophosphamide / Fluorouracil         HISTORY OF PRESENT ILLNESS:  The patient is a 49 y.o. female, here for follow up on management of ER positive right breast cancer  "currently on adjuvant therapy with CAF after having reaction to TC.  She has completed 5 of 6 planned cycles of CAF.  She is tolerating therapy well.  Denies any nausea or vomiting although she had just vomited when I walked in the exam room today.  She states she quickly ate a hash brown from [employer] this morning and it did not sit well on her stomach.  She reports feeling better since throwing up.  Her weight is down 6 pounds although she said she has been eating and drinking normally.  She does have constipation that is controlled with Trulance.  Otherwise no new issues or concerns.      Past Medical History:   Diagnosis Date    Asthma     Breast cancer     COPD (chronic obstructive pulmonary disease)     Depression     history    GERD (gastroesophageal reflux disease)      Past Surgical History:   Procedure Laterality Date    BARIATRIC SURGERY      BREAST SURGERY      Double mastectomy    ENDOSCOPY      EGD x2    GASTRECTOMY      SLEEVE    HEMATOMA EVACUATION TRUNK Bilateral 03/21/2024    Procedure: HEMATOMA EVACUATION TRUNK;  Surgeon: Phuong Loredo MD;  Location:  [facility];  Service: General;  Laterality: Bilateral;    LYMPH NODE BIOPSY      MASTECTOMY W/ SENTINEL NODE BIOPSY Bilateral 03/21/2024    Procedure: BREAST MASTECTOMY WITH SENTINEL NODE BIOPSY BILATERAL;  Surgeon: Phuong Loredo MD;  Location:  [facility];  Service: General;  Laterality: Bilateral;       No Known Allergies    Family History and Social History reviewed and changed as necessary    REVIEW OF SYSTEM:   No new somatic complaints    PHYSICAL EXAM:  Appears well, no acute distress  Alopecia  Lungs clear bilaterally, respirations even unlabored  Tachycardia, nausea and vomiting upon presentation today      Vitals:    10/17/24 0938   BP: 122/85   Pulse: (!) 133   Resp: 18   Temp: 97.5 °F (36.4 °C)   SpO2: 96%   Weight: 85.3 kg (188 lb)   Height: 161.3 cm (63.5\")     Vitals:    10/17/24 0938   PainSc: 0-No pain          ECOG " score: 0           Vitals reviewed.  Labs reviewed    ECOG: (0) Fully Active - Able to Carry On All Pre-disease Performance Without Restriction    Lab Results   Component Value Date    HGB 10.5 (L) 10/15/2024    HCT 35.0 10/15/2024    MCV 88 10/15/2024     (H) 10/15/2024    WBC 5.6 10/15/2024    NEUTROABS 3.0 10/15/2024    LYMPHSABS 1.7 10/15/2024    MONOSABS 0.8 10/15/2024    EOSABS 0.0 10/15/2024    BASOSABS 0.1 10/15/2024       Lab Results   Component Value Date    GLUCOSE 92 10/15/2024    BUN 8 10/15/2024    CREATININE 0.94 10/15/2024     10/15/2024    K 5.2 10/15/2024     10/15/2024    CO2 24 10/15/2024    CALCIUM 10.1 10/15/2024    PROTEINTOT 6.6 06/21/2024    ALBUMIN 4.4 10/15/2024    BILITOT <0.2 10/15/2024    ALKPHOS 100 10/15/2024    AST 16 10/15/2024    ALT 8 10/15/2024             ASSESSMENT & PLAN:  1.  Bilateral breast cancer:  -Right side invasive ductal clinical T1c N0, 1.4 centimeter, ER positive CT positive HER2 negative, grade 2.  Pathologic 1.1 cm T1c N0 on mastectomy.  Recurrence score 44.  Distant recurrence risk on hormone blockade alone 32%. > 15% absolute chemotherapeutic benefit.  -Left side invasive ductal clinical T1b N0, 8 MM, ER negative, CT weakly positive, HER2/nabila negative, essentially triple negative grade 3.  Pathologic T1 a N0 with no residual tumor on mastectomy  Initial bilateral mastectomy recommended.    - Negative genetic testing.  -Received 2 cycles of TC and despite heavy premedication could not tolerate with reaction in the chair.  Hence, switch to CAF x 6 on 6/28/2024.  2.  COPD  3.  Hypertension    Oncology history timeline:  -1/11/2024 bilateral screening mammogram [facility] imaging showed asymmetry right breast additional views needed with mass in left breast requiring additional imaging  -2/1/2024 bilateral diagnostic mammogram [facility] showed persistent focal asymmetry  right 12:00 7 cm from the nipple 7 mm hypoechoic mass indistinct with irregular margins ultrasound-guided biopsy recommended.  Left breast showed 8 mm 3:00 oval mass 10 cm from the nipple that on the ultrasound was 6 x 5 x 5 mm.  Incidental 4 mm 2:00 hypoechoic mass with internal vascularity 5 cm from the nipple for which ultrasound-guided biopsy recommended  -2/5/2024 right ultrasound-guided core biopsy 7 mm mass with HydroMARK T4 shaped tissue marker placed at the biopsy site.  Left ultrasound breast core biopsy 3:00 6 mm mass and 2:00 4 mm mass with HydroMARK T3 and T4 respectively.  Right breast 12:00 lesion grade 2 invasive ductal Jimenez-Cui 6 out of 9, 4 mm.  % 3+, LA 50% 3+.  Also intermediate grade ductal carcinoma in situ  Left breast 3:00 invasive ductal carcinoma grade 3 Bloom-Cui 9 out of 9, 5-6 mm, ER 0%, LA 25% 1+, HER2/nabila negative 1+  Left breast 2:00 core biopsy fibroadenoma  -2/12/2024 office note Dr. Gregorio Ashton indicates patient with newly diagnosed bilateral breast cancer.  Screening mammogram showed 2 abnormalities in the left breast and 1 in the right.    No personal or family history of breast cancer.  Has hot flashes but not taking estrogen supplements.  -2/13/2024 cervical spine x-ray negative    -2/15/2024 [facility] medical oncology follow-up: Patient has bilateral breast cancers as outlined on routine screening mammogram without any other symptoms.  After her diagnosis, she has had muscle tension aches in the left shoulder which she has a tens unit and recent injection of steroids by her primary care.  This is distinctly in the muscle she says and not in her bones.2/13/2024 cervical spine x-ray has C7 obscured by overlapping bone and soft tissues but otherwise unremarkable. Recent CBC and CMP had normal bone enzymes and was otherwise unremarkable.  Hence I would not make much out of the neck pain in the way of concern for metastatic disease unless this worsens.   She is  under a lot of psychological strain from this diagnosis and is quite anxious and I will refer her to Ashlee Nayak are oncology psychiatrist and we will get our breast cancer navigator working with her.  The 7 mm right breast tumor is grade 2  % 3+, [place] 50% 3+, HER2/nabila 0+.  The left breast is clinically 6 mm grade 3 invasive ductal ER negative [place] weakly positive HER2/nabila 1 negative essentially triple negative.  We will get MRI of her breasts and if the lesion in the left breast turns out to be larger than 1 cm mammographically or is node positive then we would give neoadjuvant therapy.  If not, both for the 7 mm right breast and the 8 mm left breast tumor I would consider primary resection.  She has just started a job with [employer] in [place] and her insurance changes to [facility] in 2 weeks and she wants her surgery done in the [facility] facility for insurance reasons.  I will discuss this with Dr. Ashton (he was scrubbed when I called today) whose skills I recommended to her but we will make appropriate referrals per her request.  With her triple negativity she will also get genetic counseling.  Absent any other somatic complaints and assuming her neck continues to improve I would do no additional staging imaging in the absence of such symptoms.  We will also present her at our multidisciplinary tumor board once the MRI is completed for final decision making.She is committed to bilateral mammograms regardless of the results of the MRI or genetic testing.  Genetic testing is done on all triple negatives.She does have a history of gastric sleeve but still needs to lose weight.  She also carries a diagnosis of COPD and I am not sure who has managed that and Dr. QUAN may need her cleared by primary care/pulmonary before surgery but I will leave it to him.  She is not oxygen requiring.    -2/21/2024 bilateral breast MRI:  Right breast 12:00, 8 cm from nipple, 1.4 cm enhancing mass consistent with biopsy and no  additional right breast lesions.  Left breast 2:00 5 cm from nipple is 8 mm masslike enhancement with central signal void artifact corresponding to biopsy site.  No additional suspicious sites in the left breast.  No internal mammary nor axillary salazar involvement on either side    -3/1/2024 [facility] oncology follow-up: We reviewed her MRI at multidisciplinary tumor board.  For the right breast cancer she would have surgery primarily followed by adjuvant hormone blockade but would not do Oncotype as, for the left breast for which we plan surgery upfront for the 8 mm size of tumor triple negative she will need chemo regardless.  If the left breast lesion ends up less than 1 cm node-negative then we would give her Taxotere Cytoxan x 4.    If the left breast lesion is over a centimeter but less than 2 cm, then I would add Adriamycin.  If the left breast lesion is over 2 cm pathologically or node positive, then I would add carboplatin and Keytruda.  For the right breast cancer I would give her at least 5 and, if she is tolerating, 10 years of hormone blockade.  She had gone 1 year without menses but just started spotting.  I contacted Dr. Ramirez who will see her in [facility] office today for pelvic exam and to get hormone levels and to get ultrasound scheduled.  She has COPD without pulmonary function since prior to her surgery for bariatric surgery in 2019.  She smoked until November.  She is not requiring oxygen and uses her inhalers efficiently without major symptoms.  I spoke with Dr. QUAN and he is comfortable with proceeding without pulmonary function tests and he will get preoperative ABG.  I will see her back in about a month which will give her time to hopefully have healed from her surgery and we can then make plans for adjuvant therapy based upon the above algorithm.  She will need a port but Dr. QUAN does not want to place this during the time of surgery but he will make arrangements to do that  postoperatively.    -3/21/2024 evacuation left mastectomy hematomaWith bilateral skin sparing mastectomies and bilateral sentinel node injection and biopsies.  Left breast mastectomy pathology shows no residual carcinoma and 1 benign sentinel node.  Pathologic T1 a N0 [place] weakly positive essentially triple negative  Right breast mastectomy pathology shows 1.1 cm grade 3 invasive ductal carcinoma, 2 benign sentinel nodes pathologic T1c N0 ER/MS positive HER2/nabila negative.  Oncotype score 44 distant recurrence risk at 9 years 32%.  Greater than 15% absolute benefit from chemotherapy.    -4/2/2024 [facility] medical oncology follow-up: I reviewed the above bilateral mastectomy pathology reports with her.  Wounds are healing well though still with significant healing yet to do and still has drains in place and is due to see Dr. QUAN tomorrow.  Her left breast had no residual tumor and no lymph node involvement for a pathological T1a N0 weakly MS positive essentially triple negative breast cancer for which I have placed orders for Taxotere Cytoxan x 4.  For her right breast mastectomy which is healing well, she had a 1.1 cm grade 3 invasive ductal carcinoma with 2 benign sentinel nodes pathological T1c N0 ER and MS positive HER2/nabila negative for which I will treat her with Arimidex x 10 years following her chemotherapy.  We will get her to Dr. QUAN for port placement and she will have chemo preparation visit in our [place] office and we will start treatment in 2 weeks assuming Dr. QUAN has cleared her surgically.  No need for radiation given bilateral mastectomies.  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    -4/9/2024 chemotherapy education preparation and needs assessment completed    -4/12/2024 Genetic testing was negative for pathogenic mutations in BRCA1/2 and 46 additional genes on the Common Hereditary Cancers panel.      -5/2/2024 treatment began with TC for a planned 4  courses    -5/2/2024 [facility] oncology clinic follow-up: Ashlee is ready to begin adjuvant therapy with TC today for a planned 4 courses.  Her drains have been removed, she still has sutures in place but her mastectomy scars appear well-healed.  I reviewed labs from yesterday, counts acceptable to continue treatment today.  She is taking her dexamethasone that she started yesterday pretreatment for 3 days.  Once she completes chemotherapy we will plan on Arimidex for 10 years.  Her genetic testing was negative.     - 6/14/2024 [facility] medical oncology telemedicine follow-up: She received cycle 1 TC on 5/2/2024.  13 minutes into her docetaxel, she reported severe bilateral low back and hip pain flushing and diaphoresis with mild abdominal discomfort and anxiousness.  Reaction medications administered.  Docetaxel restarted half rate and then increase to original rate and the remainder of the infusion well-tolerated.  She went to the emergency room 5/10/2024 with nausea vomiting and weakness lasting the previous 3 days concerned about dehydration.  5/21/2024 hemoglobin 8.3 platelets 844,000 with MCV 79.6 otherwise normal CBC and differential.  Potassium 5.3 normal creatinine otherwise unremarkable CMP.  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  As she has a very high recurrence score, I would try the Taxotere again with institution of all the rescue meds upfront and 0.5 mg IV Ativan premed.  We will do this next week in our [place] office and if she tolerates that we will see her back 3 weeks later for cycle 3 of 4.      -6/21/2024 note: Called to infusion.  Patient had had all the rescue occasions given upfront and despite that had terrible back pain.  Not as excruciating as the first cycle but still too much to handle.   No wheezing or rashes but nonetheless concerning for potential infusion allergic reaction.  In the literature looking at docetaxel after paclitaxel infusion reactions, there was a 50% chance of reaction suggesting that it is the taxane moiety and not just the vehicle causing the reaction in all cases.  Hence, I am hesitant to use Taxol in place of Taxotere.  To that end, I will get her ejection fraction and assuming it is normal we will try 6 cycles of CAF.  Prozac has a category X potential increase of Adriamycin levels.  She is prescribed this by her primary care.  I will discontinue and substitute Effexor XR 75 mg daily which does not have this interaction.          -6/26/2024 echocardiogram ejection fraction 51-55%.  GLS -22%  CAF PEG filgrastim Pharm.D. education Debo Moran    -6/28/2024 CAF cycle 1/6    -7/25/2024 [facility] medical oncology follow-up: Tolerated cycle 1 of CAF with no side effects.  Now on Effexor instead of Prozac for the category X interaction with Prozac with no problems and feeling fairly fit other than some fatigue.  Continue 2 out of 6 CAF with follow-up 8/9/2024.  See above for subsequent plans    -8/16/2024 [facility] oncology clinic follow-up: Ashlee is doing well on current treatment with CAF with no unusual side effects.  Labs reviewed from 8/14/2024, counts acceptable to continue treatment unchanged.  She has had no further back pain with her treatments.  We will proceed today with cycle 3 of a planned 6 courses.  Since she is tolerating this well we will go ahead and move her back to our [place] location for subsequent treatments.  She will continue using emollient lotions on her hands, and looks like she may have eczema.  Once we have finished treatment recommend she see a dermatologist.  She continues to do well on Effexor after changing from Prozac.    -9/5/2024 [facility] oncology clinic follow-up: Ashlee continues to tolerate current therapy with CAF with no significant side effects.   Labs reviewed from 9/3/2024, counts acceptable to continue treatment today unchanged with cycle #4 of a planned 6 cycles.  She has been hypertensive on a few occasions looking back through her synopsis, blood pressure today 137/91.  In review of her medication list she was prescribed metoprolol but states that she has not been taking that, she will get with her primary care provider for refill, her heart rate also has been running typically in the 100s-1 teens and we discussed that the metoprolol also will help with her tachycardia.  She will need hormonal blockade therapy once she completes chemotherapy, we plan on Arimidex for at least 5 and up to 10 years of adjuvant therapy as outlined above by Dr. Youngblood.    -9/26/2024 [facility] Oncology clinic follow-up: She has had 4 cycles thus far of a planned 6 cycles of CAF, she continues to tolerate with no significant side effects.  She has occasional constipation managed with stool softeners.  She is now back on metoprolol and her blood pressure is better.  Labs reviewed from 9/24/2024, counts acceptable to continue treatment unchanged.  We will proceed today with cycle #5.  We will see her back in 3 weeks for her final treatment.  Following that we plan on up to 10 years of adjuvant therapy with Arimidex, as previously stated no plans for radiation given bilateral mastectomies.    -10/17/2024 [facility] medical oncology APRN visit: Patient has completed 5 of 6 planned cycles of CAF.  She is tolerating treatment without any significant side effects.  She has occasional constipation that is controlled with Trulance.  She vomited this morning in the exam room but relates it to eating a hash brown for [employer] that did not sit well with her.  She has not been experiencing any nausea or vomiting although her weight is down 6 pounds, she states she is eating normally.  I reviewed her labs from today that are adequate for treatment.  Will proceed with cycle 6 today as planned.   We will see her back in 3 weeks to initiate Arimidex which we will plan on up to 10 years if tolerated.  No plans for radiation given bilateral mastectomies.    Return to clinic in 3 weeks for follow-up    This was a level 4, moderate MDM visit with management of side effects of therapy, review of labs, management of drug therapy requiring intensive monitoring for toxicity.  Renee Carr, APRN  10/17/2024

## 2024-11-04 RX ORDER — METOPROLOL SUCCINATE 25 MG/1
25 TABLET, EXTENDED RELEASE ORAL DAILY
Qty: 30 TABLET | Refills: 2 | Status: SHIPPED | OUTPATIENT
Start: 2024-11-04

## 2024-11-05 ENCOUNTER — OFFICE VISIT (OUTPATIENT)
Dept: ONCOLOGY | Facility: CLINIC | Age: 49
End: 2024-11-05
Payer: COMMERCIAL

## 2024-11-05 VITALS
RESPIRATION RATE: 18 BRPM | WEIGHT: 188 LBS | TEMPERATURE: 98.4 F | OXYGEN SATURATION: 96 % | DIASTOLIC BLOOD PRESSURE: 93 MMHG | BODY MASS INDEX: 32.1 KG/M2 | HEIGHT: 64 IN | HEART RATE: 83 BPM | SYSTOLIC BLOOD PRESSURE: 147 MMHG

## 2024-11-05 DIAGNOSIS — C50.811 MALIGNANT NEOPLASM OF OVERLAPPING SITES OF BOTH BREASTS IN FEMALE, ESTROGEN RECEPTOR POSITIVE: Primary | Chronic | ICD-10-CM

## 2024-11-05 DIAGNOSIS — C50.812 MALIGNANT NEOPLASM OF OVERLAPPING SITES OF BOTH BREASTS IN FEMALE, ESTROGEN RECEPTOR POSITIVE: Primary | Chronic | ICD-10-CM

## 2024-11-05 DIAGNOSIS — Z17.0 MALIGNANT NEOPLASM OF OVERLAPPING SITES OF BOTH BREASTS IN FEMALE, ESTROGEN RECEPTOR POSITIVE: Primary | Chronic | ICD-10-CM

## 2024-11-05 RX ORDER — ANASTROZOLE 1 MG/1
1 TABLET ORAL DAILY
Qty: 90 TABLET | Refills: 3 | Status: SHIPPED | OUTPATIENT
Start: 2024-11-05

## 2024-11-05 NOTE — LETTER
November 5, 2024     SHAVON Lynch  [address]  [facility] [address]    Patient: Ashlee Marte   YOB: 1975   Date of Visit: 11/5/2024     Dear SHAVON Lynch:       Thank you for referring Ashlee Marte to me for evaluation. Below are the relevant portions of my assessment and plan of care.    If you have questions, please do not hesitate to call me. I look forward to following Ashlee along with you.         Sincerely,        Corky oYungblood MD        CC: MD Sheridan Wilson MD Hicks, Corky GOLDSTEIN MD  11/05/24 1558  Sign when Signing Visit  CHIEF COMPLAINT: No somatic complaints    Problem List:  Oncology/Hematology History Overview Note   1.  Bilateral breast cancer:  -Right side invasive ductal clinical T1c N0, 1.4 centimeter, ER positive IL positive HER2 negative, grade 2.  Pathologic 1.1 cm T1c N0 on mastectomy.  Recurrence score 44.  Distant recurrence risk on hormone blockade alone 32%. > 15% absolute chemotherapeutic benefit.  -Left side invasive ductal clinical T1b N0, 8 MM, ER negative, IL weakly positive, HER2/nabila negative, essentially triple negative grade 3.  Pathologic T1 a N0 with no residual tumor on mastectomy  Initial bilateral mastectomy recommended.    - Negative genetic testing.  -Received 2 cycles of TC and despite heavy premedication could not tolerate with reaction in the chair.  Hence, switch to CAF x 6 on 6/28/2024.  2.  COPD    Oncology history timeline:  -1[facility]11[facility]2024 [facility] showed asymmetry right breast additional views needed with mass in left breast requiring additional imaging  -2/1/2024 bilateral diagnostic mammogram [facility] showed persistent focal asymmetry right 12:00 7 cm from the nipple 7 mm hypoechoic mass indistinct with irregular margins ultrasound-guided biopsy recommended.  Left breast showed 8 mm 3:00  oval mass 10 cm from the nipple that on the ultrasound was 6 x 5 x 5 mm.  Incidental 4 mm 2:00 hypoechoic mass with internal vascularity 5 cm from the nipple for which ultrasound-guided biopsy recommended  -2/5/2024 right ultrasound-guided core biopsy 7 mm mass with HydroMARK T4 shaped tissue marker placed at the biopsy site.  Left ultrasound breast core biopsy 3:00 6 mm mass and 2:00 4 mm mass with HydroMARK T3 and T4 respectively.  Right breast 12:00 lesion grade 2 invasive ductal Jimenez-Cui 6 out of 9, 4 mm.  % 3+, SC 50% 3+.  Also intermediate grade ductal carcinoma in situ  Left breast 3:00 invasive ductal carcinoma grade 3 Bloom-Cui 9 out of 9, 5-6 mm, ER 0%, SC 25% 1+, HER2/nabila negative 1+  Left breast 2:00 core biopsy fibroadenoma  -2/12/2024 office note Dr. Gregorio Ashton indicates patient with newly diagnosed bilateral breast cancer.  Screening mammogram showed 2 abnormalities in the left breast and 1 in the right.    No personal or family history of breast cancer.  Has hot flashes but not taking estrogen supplements.  -2/13/2024 cervical spine x-ray negative    -2/15/2024 [facility] medical oncology follow-up: Patient has bilateral breast cancers as outlined on routine screening mammogram without any other symptoms.  After her diagnosis, she has had muscle tension aches in the left shoulder which she has a tens unit and recent injection of steroids by her primary care.  This is distinctly in the muscle she says and not in her bones.2/13/2024 cervical spine x-ray has C7 obscured by overlapping bone and soft tissues but otherwise unremarkable. Recent CBC and CMP had normal bone enzymes and was otherwise unremarkable.  Hence I would not make much out of the neck pain in the way of concern for metastatic disease unless this worsens.   She is under a lot of psychological strain from this diagnosis and is quite anxious and I will refer her to Ashlee Nayak are oncology psychiatrist and we will get our  breast cancer navigator working with her.  The 7 mm right breast tumor is grade 2  % 3+, HI 50% 3+, HER2/nabila 0+.  The left breast is clinically 6 mm grade 3 invasive ductal ER negative HI weakly positive HER2/nabila 1 negative essentially triple negative.  We will get MRI of her breasts and if the lesion in the left breast turns out to be larger than 1 cm mammographically or is node positive then we would give neoadjuvant therapy.  If not, both for the 7 mm right breast and the 8 mm left breast tumor I would consider primary resection.  She has just started a job with [employer] in [place] and her insurance changes to [employer] in 2 weeks and she wants her surgery done in the [facility] facility for insurance reasons.  I will discuss this with Dr. Ashton (he was scrubbed when I called today) whose skills I recommended to her but we will make appropriate referrals per her request.  With her triple negativity she will also get genetic counseling.  Absent any other somatic complaints and assuming her neck continues to improve I would do no additional staging imaging in the absence of such symptoms.  We will also present her at our multidisciplinary tumor board once the MRI is completed for final decision making.She is committed to bilateral mammograms regardless of the results of the MRI or genetic testing.  Genetic testing is done on all triple negatives.She does have a history of gastric sleeve but still needs to lose weight.  She also carries a diagnosis of COPD and I am not sure who has managed that and Dr. QUAN may need her cleared by primary care/pulmonary before surgery but I will leave it to him.  She is not oxygen requiring.    -2/21/2024 bilateral breast MRI:  Right breast 12:00, 8 cm from nipple, 1.4 cm enhancing mass consistent with biopsy and no additional right breast lesions.  Left breast 2:00 5 cm from nipple is 8 mm masslike enhancement with central signal void artifact corresponding to biopsy site.   No additional suspicious sites in the left breast.  No internal mammary nor axillary salazar involvement on either side    -3/1/2024 [facility] medical oncology follow-up: We reviewed her MRI at multidisciplinary tumor board.  For the right breast cancer she would have surgery primarily followed by adjuvant hormone blockade but would not do Oncotype as, for the left breast for which we plan surgery upfront for the 8 mm size of tumor triple negative she will need chemo regardless.  If the left breast lesion ends up less than 1 cm node-negative then we would give her Taxotere Cytoxan x 4.    If the left breast lesion is over a centimeter but less than 2 cm, then I would add Adriamycin.  If the left breast lesion is over 2 cm pathologically or node positive, then I would add carboplatin and Keytruda.  For the right breast cancer I would give her at least 5 and, if she is tolerating, 10 years of hormone blockade.  She had gone 1 year without menses but just started spotting.  I contacted Dr. Ramirez who will see her in [facility] office today for pelvic exam and to get hormone levels and to get ultrasound scheduled.  She has COPD without pulmonary function since prior to her surgery for bariatric surgery in 2019.  She smoked until November.  She is not requiring oxygen and uses her inhalers efficiently without major symptoms.  I spoke with Dr. QUAN and he is comfortable with proceeding without pulmonary function tests and he will get preoperative ABG.  I will see her back in about a month which will give her time to hopefully have healed from her surgery and we can then make plans for adjuvant therapy based upon the above algorithm.  She will need a port but Dr. QUAN does not want to place this during the time of surgery but he will make arrangements to do that postoperatively.    -3/21/2024 evacuation left mastectomy hematomaWith bilateral skin sparing mastectomies and bilateral sentinel node injection and biopsies.  Left  breast mastectomy pathology shows no residual carcinoma and 1 benign sentinel node.  Pathologic T1 a N0 OR weakly positive essentially triple negative  Right breast mastectomy pathology shows 1.1 cm grade 3 invasive ductal carcinoma, 2 benign sentinel nodes pathologic T1c N0 ER/OR positive HER2/nabila negative.  Oncotype score 44 distant recurrence risk at 9 years 32%.  Greater than 15% absolute benefit from chemotherapy.    -4/2/2024 [facility] medical oncology follow-up: I reviewed the above bilateral mastectomy pathology reports with her.  Wounds are healing well though still with significant healing yet to do and still has drains in place and is due to see Dr. QUAN tomorrow.  Her left breast had no residual tumor and no lymph node involvement for a pathological T1a N0 weakly OR positive essentially triple negative breast cancer for which I have placed orders for Taxotere Cytoxan x 4.  For her right breast mastectomy which is healing well, she had a 1.1 cm grade 3 invasive ductal carcinoma with 2 benign sentinel nodes pathological T1c N0 ER and OR positive HER2/nabila negative for which I will treat her with Arimidex x 10 years following her chemotherapy.  We will get her to Dr. QUAN for port placement and she will have chemo preparation visit in our [place] office and we will start treatment in 2 weeks assuming Dr. QUAN has cleared her surgically.  No need for radiation given bilateral mastectomies.  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    -4/9/2024 chemotherapy education preparation and needs assessment completed    -4/12/2024 Genetic testing was negative for pathogenic mutations in BRCA1/2 and 46 additional genes on the Common Hereditary Cancers panel.      -5/2/2024 treatment began with TC for a planned 4 courses    -5/2/2024 [facility] oncology clinic follow-up: Ashlee is ready to begin adjuvant therapy with TC today for a planned 4 courses.  Her drains have been removed, she  still has sutures in place but her mastectomy scars appear well-healed.  I reviewed labs from yesterday, counts acceptable to continue treatment today.  She is taking her dexamethasone that she started yesterday pretreatment for 3 days.  Once she completes chemotherapy we will plan on Arimidex for 10 years.  Her genetic testing was negative.     - 6/14/2024 [facility] oncology telemedicine follow-up: She received cycle 1 TC on 5/2/2024.  13 minutes into her docetaxel, she reported severe bilateral low back and hip pain flushing and diaphoresis with mild abdominal discomfort and anxiousness.  Reaction medications administered.  Docetaxel restarted half rate and then increase to original rate and the remainder of the infusion well-tolerated.  She went to the emergency room 5/10/2024 with nausea vomiting and weakness lasting the previous 3 days concerned about dehydration.  5/21/2024 hemoglobin 8.3 platelets 844,000 with MCV 79.6 otherwise normal CBC and differential.  Potassium 5.3 normal creatinine otherwise unremarkable CMP.  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  As she has a very high recurrence score, I would try the Taxotere again with institution of all the rescue meds upfront and 0.5 mg IV Ativan premed.  We will do this next week in our [place] office and if she tolerates that we will see her back 3 weeks later for cycle 3 of 4.      -6/21/2024 note: Called to infusion.  Patient had had all the rescue occasions given upfront and despite that had terrible back pain.  Not as excruciating as the first cycle but still too much to handle.  No wheezing or rashes but nonetheless concerning for potential infusion allergic reaction.  In the literature looking at docetaxel after paclitaxel infusion reactions,  there was a 50% chance of reaction suggesting that it is the taxane moiety and not just the vehicle causing the reaction in all cases.  Hence, I am hesitant to use Taxol in place of Taxotere.  To that end, I will get her ejection fraction and assuming it is normal we will try 6 cycles of CAF.  Prozac has a category X potential increase of Adriamycin levels.  She is prescribed this by her primary care.  I will discontinue and substitute Effexor XR 75 mg daily which does not have this interaction.          -6/26/2024 echocardiogram ejection fraction 51-55%.  GLS -22%  CAF PEG filgrastim Pharm.D. education Debo Moran    -6/28/2024 CAF cycle 1/6    -7/25/2024 [facility] medical oncology follow-up: Tolerated cycle 1 of CAF with no side effects.  Now on Effexor instead of Prozac for the category X interaction with Prozac with no problems and feeling fairly fit other than some fatigue.  Continue 2 out of 6 CAF with follow-up 8/9/2024.  See above for subsequent plans    -8/16/2024 [facility] oncology clinic follow-up: Ashlee is doing well on current treatment with CAF with no unusual side effects.  Labs reviewed from 8/14/2024, counts acceptable to continue treatment unchanged.  She has had no further back pain with her treatments.  We will proceed today with cycle 3 of a planned 6 courses.  Since she is tolerating this well we will go ahead and move her back to our [place] location for subsequent treatments.  She will continue using emollient lotions on her hands, and looks like she may have eczema.  Once we have finished treatment recommend she see a dermatologist.  She continues to do well on Effexor after changing from Prozac.    -9/5/2024 [facility] oncology clinic follow-up: Ashlee continues to tolerate current therapy with CAF with no significant side effects.  Labs reviewed from 9/3/2024, counts acceptable to continue treatment today unchanged with cycle #4 of a planned 6 cycles.  She has been hypertensive on a few  occasions looking back through her synopsis, blood pressure today 137/91.  In review of her medication list she was prescribed metoprolol but states that she has not been taking that, she will get with her primary care provider for refill, her heart rate also has been running typically in the 100s-1 teens and we discussed that the metoprolol also will help with her tachycardia.  She will need hormonal blockade therapy once she completes chemotherapy, we plan on Arimidex for at least 5 and up to 10 years of adjuvant therapy as outlined above by Dr. Youngblood.    -9/26/2024 [facility] Oncology clinic follow-up: She has had 4 cycles thus far of a planned 6 cycles of CAF, she continues to tolerate with no significant side effects.  She has occasional constipation managed with stool softeners.  She is now back on metoprolol and her blood pressure is better.  Labs reviewed from 9/24/2024, counts acceptable to continue treatment unchanged.  We will proceed today with cycle #5.  We will see her back in 3 weeks for her final treatment.  Following that we plan on up to 10 years of adjuvant therapy with Arimidex, as previously stated no plans for radiation given bilateral mastectomies.    -10/17/2024 [facility] medical oncology APRN visit: Patient has completed 5 of 6 planned cycles of CAF.  She is tolerating treatment without any significant side effects.  She has occasional constipation that is controlled with Trulance.  She vomited this morning in the exam room but relates it to eating a hash brown for [employer] that did not sit well with her.  She has not been experiencing any nausea or vomiting although her weight is down 6 pounds, she states she is eating normally.  I reviewed her labs from today that are adequate for treatment.  Will proceed with cycle 6 today as planned.  We will see her back in 3 weeks to initiate Arimidex which we will plan on up to 10 years if tolerated.  No plans for radiation given bilateral  mastectomies.     Malignant neoplasm of overlapping sites of both breasts in female, estrogen receptor positive   5/1/2024 -  Chemotherapy    OP CENTRAL VENOUS ACCESS DEVICE Access, Care, and Maintenance (CVAD)     5/2/2024 - 6/21/2024 Chemotherapy    OP BREAST TC DOCEtaxel / Cyclophosphamide     6/21/2024 -  Chemotherapy    OP CVAD Occlusion - Alteplase     6/28/2024 -  Chemotherapy    OP BREAST FAC DOXOrubicin / Cyclophosphamide / Fluorouracil     Malignant neoplasm of overlapping sites of right female breast   3/21/2024 Initial Diagnosis    Malignant neoplasm of overlapping sites of right female breast     5/2/2024 - 6/21/2024 Chemotherapy    OP BREAST TC DOCEtaxel / Cyclophosphamide     6/28/2024 -  Chemotherapy    OP BREAST FAC DOXOrubicin / Cyclophosphamide / Fluorouracil         HISTORY OF PRESENT ILLNESS:  The patient is a 49 y.o. female, here for follow up on management of adjuvant therapy breast cancer.  No somatic complaints    Past Medical History:   Diagnosis Date   • Asthma    • Breast cancer    • COPD (chronic obstructive pulmonary disease)    • Depression     history   • GERD (gastroesophageal reflux disease)      Past Surgical History:   Procedure Laterality Date   • BARIATRIC SURGERY     • BREAST SURGERY      Double mastectomy   • ENDOSCOPY      EGD x2   • GASTRECTOMY      SLEEVE   • HEMATOMA EVACUATION TRUNK Bilateral 03/21/2024    Procedure: HEMATOMA EVACUATION TRUNK;  Surgeon: Phuong Loredo MD;  Location: [facility] OR;  Service: General;  Laterality: Bilateral;   • LYMPH NODE BIOPSY     • MASTECTOMY W/ SENTINEL NODE BIOPSY Bilateral 03/21/2024    Procedure: BREAST MASTECTOMY WITH SENTINEL NODE BIOPSY BILATERAL;  Surgeon: Phuong Loredo MD;  Location: [facility] OR;  Service: General;  Laterality: Bilateral;       No Known Allergies    Family History and Social History reviewed and changed as necessary    REVIEW OF SYSTEM:   No somatic complaints    PHYSICAL EXAM:  3 sutures 2 on 1 side  "and 1 on the other on her mastectomy still in place.  Otherwise nothing remarkable on exam today.    Vitals:    11/05/24 1520   BP: 147/93   Pulse: 83   Resp: 18   Temp: 98.4 °F (36.9 °C)   SpO2: 96%   Weight: 85.3 kg (188 lb)   Height: 161.3 cm (63.5\")     Vitals:    11/05/24 1520   PainSc: 0-No pain          ECOG score: 0           Vitals reviewed.    ECOG: (0) Fully Active - Able to Carry On All Pre-disease Performance Without Restriction    Lab Results   Component Value Date    HGB 10.5 (L) 10/15/2024    HCT 35.0 10/15/2024    MCV 88 10/15/2024     (H) 10/15/2024    WBC 5.6 10/15/2024    NEUTROABS 3.0 10/15/2024    LYMPHSABS 1.7 10/15/2024    MONOSABS 0.8 10/15/2024    EOSABS 0.0 10/15/2024    BASOSABS 0.1 10/15/2024       Lab Results   Component Value Date    GLUCOSE 92 10/15/2024    BUN 8 10/15/2024    CREATININE 0.94 10/15/2024     10/15/2024    K 5.2 10/15/2024     10/15/2024    CO2 24 10/15/2024    CALCIUM 10.1 10/15/2024    PROTEINTOT 6.6 06/21/2024    ALBUMIN 4.4 10/15/2024    BILITOT <0.2 10/15/2024    ALKPHOS 100 10/15/2024    AST 16 10/15/2024    ALT 8 10/15/2024             ASSESSMENT & PLAN:  1.  Bilateral breast cancer:  -Right side invasive ductal clinical T1c N0, 1.4 centimeter, ER positive WI positive HER2 negative, grade 2.  Pathologic 1.1 cm T1c N0 on mastectomy.  Recurrence score 44.  Distant recurrence risk on hormone blockade alone 32%. > 15% absolute chemotherapeutic benefit.  -Left side invasive ductal clinical T1b N0, 8 MM, ER negative, WI weakly positive, HER2/nabila negative, essentially triple negative grade 3.  Pathologic T1 a N0 with no residual tumor on mastectomy  Initial bilateral mastectomy recommended.    - Negative genetic testing.  -Received 2 cycles of TC and despite heavy premedication could not tolerate with reaction in the chair.  Hence, switch to CAF x 6 on 6/28/2024.Sixth cycle 10/17/2024.  11/5/2024 started Arimidex  2.  COPD    Oncology history " timeline:  -1/11/2024 bilateral screening mammogram [facility] [place] [facility] [place] imaging showed asymmetry right breast additional views needed with mass in left breast requiring additional imaging  -2/1/2024 bilateral diagnostic mammogram [facility] showed persistent focal asymmetry right 12:00 7 cm from the nipple 7 mm hypoechoic mass indistinct with irregular margins ultrasound-guided biopsy recommended.  Left breast showed 8 mm 3:00 oval mass 10 cm from the nipple that on the ultrasound was 6 x 5 x 5 mm.  Incidental 4 mm 2:00 hypoechoic mass with internal vascularity 5 cm from the nipple for which ultrasound-guided biopsy recommended  -2/5/2024 right ultrasound-guided core biopsy 7 mm mass with HydroMARK T4 shaped tissue marker placed at the biopsy site.  Left ultrasound breast core biopsy 3:00 6 mm mass and 2:00 4 mm mass with HydroMARK T3 and T4 respectively.  Right breast 12:00 lesion grade 2 invasive ductal Jimenez-Cui 6 out of 9, 4 mm.  % 3+, OH 50% 3+.  Also intermediate grade ductal carcinoma in situ  Left breast 3:00 invasive ductal carcinoma grade 3 Bloom-Cui 9 out of 9, 5-6 mm, ER 0%, OH 25% 1+, HER2/nabila negative 1+  Left breast 2:00 core biopsy fibroadenoma  -2/12/2024 office note Dr. Gregorio Ashton indicates patient with newly diagnosed bilateral breast cancer.  Screening mammogram showed 2 abnormalities in the left breast and 1 in the right.    No personal or family history of breast cancer.  Has hot flashes but not taking estrogen supplements.  -2/13/2024 cervical spine x-ray negative    -2/15/2024 [facility] medical oncology follow-up: Patient has bilateral breast cancers as outlined on routine screening mammogram without any other symptoms.  After her diagnosis, she has had muscle tension aches in the left shoulder which she has a tens unit and recent injection of steroids by her primary care.  This is distinctly in the muscle  she says and not in her bones.2/13/2024 cervical spine x-ray has C7 obscured by overlapping bone and soft tissues but otherwise unremarkable. Recent CBC and CMP had normal bone enzymes and was otherwise unremarkable.  Hence I would not make much out of the neck pain in the way of concern for metastatic disease unless this worsens.   She is under a lot of psychological strain from this diagnosis and is quite anxious and I will refer her to Ashlee Nayak are oncology psychiatrist and we will get our breast cancer navigator working with her.  The 7 mm right breast tumor is grade 2  % 3+, OK 50% 3+, HER2/nabila 0+.  The left breast is clinically 6 mm grade 3 invasive ductal ER negative OK weakly positive HER2/nabila 1 negative essentially triple negative.  We will get MRI of her breasts and if the lesion in the left breast turns out to be larger than 1 cm mammographically or is node positive then we would give neoadjuvant therapy.  If not, both for the 7 mm right breast and the 8 mm left breast tumor I would consider primary resection.  She has just started a job with [employer] in [place] and her insurance changes to [employer] in 2 weeks and she wants her surgery done in the [facility] facility for insurance reasons.  I will discuss this with Dr. Ashton (he was scrubbed when I called today) whose skills I recommended to her but we will make appropriate referrals per her request.  With her triple negativity she will also get genetic counseling.  Absent any other somatic complaints and assuming her neck continues to improve I would do no additional staging imaging in the absence of such symptoms.  We will also present her at our multidisciplinary tumor board once the MRI is completed for final decision making.She is committed to bilateral mammograms regardless of the results of the MRI or genetic testing.  Genetic testing is done on all triple negatives.She does have a history of gastric sleeve but still needs to lose  weight.  She also carries a diagnosis of COPD and I am not sure who has managed that and Dr. QUAN may need her cleared by primary care/pulmonary before surgery but I will leave it to him.  She is not oxygen requiring.    -2/21/2024 bilateral breast MRI:  Right breast 12:00, 8 cm from nipple, 1.4 cm enhancing mass consistent with biopsy and no additional right breast lesions.  Left breast 2:00 5 cm from nipple is 8 mm masslike enhancement with central signal void artifact corresponding to biopsy site.  No additional suspicious sites in the left breast.  No internal mammary nor axillary salazar involvement on either side    -3/1/2024 [facility] oncology follow-up: We reviewed her MRI at multidisciplinary tumor board.  For the right breast cancer she would have surgery primarily followed by adjuvant hormone blockade but would not do Oncotype as, for the left breast for which we plan surgery upfront for the 8 mm size of tumor triple negative she will need chemo regardless.  If the left breast lesion ends up less than 1 cm node-negative then we would give her Taxotere Cytoxan x 4.    If the left breast lesion is over a centimeter but less than 2 cm, then I would add Adriamycin.  If the left breast lesion is over 2 cm pathologically or node positive, then I would add carboplatin and Keytruda.  For the right breast cancer I would give her at least 5 and, if she is tolerating, 10 years of hormone blockade.  She had gone 1 year without menses but just started spotting.  I contacted Dr. Ramirez who will see her in [facility] office today for pelvic exam and to get hormone levels and to get ultrasound scheduled.  She has COPD without pulmonary function since prior to her surgery for bariatric surgery in 2019.  She smoked until November.  She is not requiring oxygen and uses her inhalers efficiently without major symptoms.  I spoke with Dr. QUAN and he is comfortable with proceeding without pulmonary function tests and he  will get preoperative ABG.  I will see her back in about a month which will give her time to hopefully have healed from her surgery and we can then make plans for adjuvant therapy based upon the above algorithm.  She will need a port but Dr. QUAN does not want to place this during the time of surgery but he will make arrangements to do that postoperatively.    -3/21/2024 evacuation left mastectomy hematomaWith bilateral skin sparing mastectomies and bilateral sentinel node injection and biopsies.  Left breast mastectomy pathology shows no residual carcinoma and 1 benign sentinel node.  Pathologic T1 a N0 MD weakly positive essentially triple negative  Right breast mastectomy pathology shows 1.1 cm grade 3 invasive ductal carcinoma, 2 benign sentinel nodes pathologic T1c N0 ER/MD positive HER2/nabila negative.  Oncotype score 44 distant recurrence risk at 9 years 32%.  Greater than 15% absolute benefit from chemotherapy.    -4/2/2024 [facility] [place] medical oncology follow-up: I reviewed the above bilateral mastectomy pathology reports with her.  Wounds are healing well though still with significant healing yet to do and still has drains in place and is due to see Dr. QUAN tomorrow.  Her left breast had no residual tumor and no lymph node involvement for a pathological T1a N0 weakly MD positive essentially triple negative breast cancer for which I have placed orders for Taxotere Cytoxan x 4.  For her right breast mastectomy which is healing well, she had a 1.1 cm grade 3 invasive ductal carcinoma with 2 benign sentinel nodes pathological T1c N0 ER and MD positive HER2/nabila negative for which I will treat her with Arimidex x 10 years following her chemotherapy.  We will get her to Dr. QUAN for port placement and she will have chemo preparation visit in our [place] office and we will start treatment in 2 weeks assuming Dr. QUAN has cleared her surgically.  No need for radiation given bilateral mastectomies.  She also needs genetic  testing with bilateral disease 1 of which was triple negative.    -4/9/2024 chemotherapy education preparation and needs assessment completed    -4/12/2024 Genetic testing was negative for pathogenic mutations in BRCA1/2 and 46 additional genes on the Common Hereditary Cancers panel.      -5/2/2024 treatment began with TC for a planned 4 courses    -5/2/2024 [facility] oncology clinic follow-up: Ashlee is ready to begin adjuvant therapy with TC today for a planned 4 courses.  Her drains have been removed, she still has sutures in place but her mastectomy scars appear well-healed.  I reviewed labs from yesterday, counts acceptable to continue treatment today.  She is taking her dexamethasone that she started yesterday pretreatment for 3 days.  Once she completes chemotherapy we will plan on Arimidex for 10 years.  Her genetic testing was negative.     - 6/14/2024 [facility] medical oncology telemedicine follow-up: She received cycle 1 TC on 5/2/2024.  13 minutes into her docetaxel, she reported severe bilateral low back and hip pain flushing and diaphoresis with mild abdominal discomfort and anxiousness.  Reaction medications administered.  Docetaxel restarted half rate and then increase to original rate and the remainder of the infusion well-tolerated.  She went to the emergency room 5/10/2024 with nausea vomiting and weakness lasting the previous 3 days concerned about dehydration.  5/21/2024 hemoglobin 8.3 platelets 844,000 with MCV 79.6 otherwise normal CBC and differential.  Potassium 5.3 normal creatinine otherwise unremarkable CMP.  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  As she has a very high recurrence score, I would try the Taxotere again with institution of all the rescue meds upfront and 0.5 mg IV Ativan premed.   We will do this next week in our [place] office and if she tolerates that we will see her back 3 weeks later for cycle 3 of 4.      -6/21/2024 note: Called to infusion.  Patient had had all the rescue occasions given upfront and despite that had terrible back pain.  Not as excruciating as the first cycle but still too much to handle.  No wheezing or rashes but nonetheless concerning for potential infusion allergic reaction.  In the literature looking at docetaxel after paclitaxel infusion reactions, there was a 50% chance of reaction suggesting that it is the taxane moiety and not just the vehicle causing the reaction in all cases.  Hence, I am hesitant to use Taxol in place of Taxotere.  To that end, I will get her ejection fraction and assuming it is normal we will try 6 cycles of CAF.  Prozac has a category X potential increase of Adriamycin levels.  She is prescribed this by her primary care.  I will discontinue and substitute Effexor XR 75 mg daily which does not have this interaction.          -6/26/2024 echocardiogram ejection fraction 51-55%.  GLS -22%  CAF PEG filgrastim Pharm.D. education Debo Moran    -6/28/2024 CAF cycle 1/6    -7/25/2024 [facility] medical oncology follow-up: Tolerated cycle 1 of CAF with no side effects.  Now on Effexor instead of Prozac for the category X interaction with Prozac with no problems and feeling fairly fit other than some fatigue.  Continue 2 out of 6 CAF with follow-up 8/9/2024.  See above for subsequent plans    -8/16/2024 [facility] oncology clinic follow-up: Ashlee is doing well on current treatment with CAF with no unusual side effects.  Labs reviewed from 8/14/2024, counts acceptable to continue treatment unchanged.  She has had no further back pain with her treatments.  We will proceed today with cycle 3 of a planned 6 courses.  Since she is tolerating this well we will go ahead and move her back to our [place] location for subsequent treatments.  She will continue  using emollient lotions on her hands, and looks like she may have eczema.  Once we have finished treatment recommend she see a dermatologist.  She continues to do well on Effexor after changing from Prozac.    -9/5/2024 [facility] oncology clinic follow-up: Ashlee continues to tolerate current therapy with CAF with no significant side effects.  Labs reviewed from 9/3/2024, counts acceptable to continue treatment today unchanged with cycle #4 of a planned 6 cycles.  She has been hypertensive on a few occasions looking back through her synopsis, blood pressure today 137/91.  In review of her medication list she was prescribed metoprolol but states that she has not been taking that, she will get with her primary care provider for refill, her heart rate also has been running typically in the 100s-1 teens and we discussed that the metoprolol also will help with her tachycardia.  She will need hormonal blockade therapy once she completes chemotherapy, we plan on Arimidex for at least 5 and up to 10 years of adjuvant therapy as outlined above by Dr. Youngblood.    -9/26/2024 [facility] Oncology clinic follow-up: She has had 4 cycles thus far of a planned 6 cycles of CAF, she continues to tolerate with no significant side effects.  She has occasional constipation managed with stool softeners.  She is now back on metoprolol and her blood pressure is better.  Labs reviewed from 9/24/2024, counts acceptable to continue treatment unchanged.  We will proceed today with cycle #5.  We will see her back in 3 weeks for her final treatment.  Following that we plan on up to 10 years of adjuvant therapy with Arimidex, as previously stated no plans for radiation given bilateral mastectomies.    -10/17/2024 [facility] medical oncology APRN visit: Patient has completed 5 of 6 planned cycles of CAF.  She is tolerating treatment without any significant side effects.  She has occasional constipation that is controlled with Trulance.  She vomited this  morning in the exam room but relates it to eating a hash brown for [employer] that did not sit well with her.  She has not been experiencing any nausea or vomiting although her weight is down 6 pounds, she states she is eating normally.  I reviewed her labs from today that are adequate for treatment.  Will proceed with cycle 6 today as planned.  We will see her back in 3 weeks to initiate Arimidex which we will plan on up to 10 years if tolerated.  No plans for radiation given bilateral mastectomies.    -11/5/2024 [facility] medical oncology follow-up: Completed adjuvant therapy 6 cycles of CAF after 2 cycles of TC which she did not tolerate and hence switched to CAF.  No somatic complaints.  Has 2 sutures on one side 1 on the other her mastectomy sites and needs her port out.  Will get her to Dr. QUAN for all of that.  Did not have suture removal kit in our clinic in [place].  She will see my nurse practitioner back in 3 months for survivorship visit.  No radiation given bilateral mastectomies.  Repeat CBC prior to return    Time of care today inclusive of time spent today prior to her arrival reviewing interval data since I last saw her and during visit translating that information to her and putting out plans as outlined above and after visit instituting plans took 50 minutes patient care time throughout the day today.  Corky Youngblood MD    11/05/2024

## 2024-11-05 NOTE — LETTER
November 5, 2024     SHAVON Lynch  [address]  [facility] [address]    Patient: Ashlee Marte   YOB: 1975   Date of Visit: 11/5/2024     Dear SHAVON Lynch:       Thank you for referring Ashlee Marte to me for evaluation. Below are the relevant portions of my assessment and plan of care.    If you have questions, please do not hesitate to call me. I look forward to following Ashlee along with you.         Sincerely,        Corky Youngblood MD        CC: MD Sheridan Wilson MD Hicks, Corky GOLDSTEIN MD  11/05/24 1558  Sign when Signing Visit  CHIEF COMPLAINT: No somatic complaints    Problem List:  Oncology/Hematology History Overview Note   1.  Bilateral breast cancer:  -Right side invasive ductal clinical T1c N0, 1.4 centimeter, ER positive NM positive HER2 negative, grade 2.  Pathologic 1.1 cm T1c N0 on mastectomy.  Recurrence score 44.  Distant recurrence risk on hormone blockade alone 32%. > 15% absolute chemotherapeutic benefit.  -Left side invasive ductal clinical T1b N0, 8 MM, ER negative, NM weakly positive, HER2/nabila negative, essentially triple negative grade 3.  Pathologic T1 a N0 with no residual tumor on mastectomy  Initial bilateral mastectomy recommended.    - Negative genetic testing.  -Received 2 cycles of TC and despite heavy premedication could not tolerate with reaction in the chair.  Hence, switch to CAF x 6 on 6/28/2024.  2.  COPD    Oncology history timeline:  -1/11/2024 [facility] showed asymmetry right breast additional views needed with mass in left breast requiring additional imaging  -2/1/2024 bilateral diagnostic mammogram [facility] showed persistent focal asymmetry right 12:00 7 cm from the nipple 7 mm hypoechoic mass indistinct with irregular margins ultrasound-guided biopsy recommended.  Left breast showed 8 mm 3:00  oval mass 10 cm from the nipple that on the ultrasound was 6 x 5 x 5 mm.  Incidental 4 mm 2:00 hypoechoic mass with internal vascularity 5 cm from the nipple for which ultrasound-guided biopsy recommended  -2/5/2024 right ultrasound-guided core biopsy 7 mm mass with HydroMARK T4 shaped tissue marker placed at the biopsy site.  Left ultrasound breast core biopsy 3:00 6 mm mass and 2:00 4 mm mass with HydroMARK T3 and T4 respectively.  Right breast 12:00 lesion grade 2 invasive ductal Jimenez-Cui 6 out of 9, 4 mm.  % 3+, UT 50% 3+.  Also intermediate grade ductal carcinoma in situ  Left breast 3:00 invasive ductal carcinoma grade 3 Bloom-Cui 9 out of 9, 5-6 mm, ER 0%, UT 25% 1+, HER2/nabila negative 1+  Left breast 2:00 core biopsy fibroadenoma  -2/12/2024 office note Dr. Gregorio Ashton indicates patient with newly diagnosed bilateral breast cancer.  Screening mammogram showed 2 abnormalities in the left breast and 1 in the right.    No personal or family history of breast cancer.  Has hot flashes but not taking estrogen supplements.  -2/13/2024 cervical spine x-ray negative    -2/15/2024 [facility] operated by [facility] medical oncology follow-up: Patient has bilateral breast cancers as outlined on routine screening mammogram without any other symptoms.  After her diagnosis, she has had muscle tension aches in the left shoulder which she has a tens unit and recent injection of steroids by her primary care.  This is distinctly in the muscle she says and not in her bones.2/13/2024 cervical spine x-ray has C7 obscured by overlapping bone and soft tissues but otherwise unremarkable. Recent CBC and CMP had normal bone enzymes and was otherwise unremarkable.  Hence I would not make much out of the neck pain in the way of concern for metastatic disease unless this worsens.   She is under a lot of psychological strain from this diagnosis and is quite anxious and I will refer her to Ashlee Nayak are oncology psychiatrist and we will get our  breast cancer navigator working with her.  The 7 mm right breast tumor is grade 2  % 3+, MA 50% 3+, HER2/nabila 0+.  The left breast is clinically 6 mm grade 3 invasive ductal ER negative MA weakly positive HER2/nabila 1 negative essentially triple negative.  We will get MRI of her breasts and if the lesion in the left breast turns out to be larger than 1 cm mammographically or is node positive then we would give neoadjuvant therapy.  If not, both for the 7 mm right breast and the 8 mm left breast tumor I would consider primary resection.  She has just started a job with [employer] in [place] and her insurance changes to [employer] in 2 weeks and she wants her surgery done in the [facility] facility for insurance reasons.  I will discuss this with Dr. Ashton (he was scrubbed when I called today) whose skills I recommended to her but we will make appropriate referrals per her request.  With her triple negativity she will also get genetic counseling.  Absent any other somatic complaints and assuming her neck continues to improve I would do no additional staging imaging in the absence of such symptoms.  We will also present her at our multidisciplinary tumor board once the MRI is completed for final decision making.She is committed to bilateral mammograms regardless of the results of the MRI or genetic testing.  Genetic testing is done on all triple negatives.She does have a history of gastric sleeve but still needs to lose weight.  She also carries a diagnosis of COPD and I am not sure who has managed that and Dr. QUAN may need her cleared by primary care/pulmonary before surgery but I will leave it to him.  She is not oxygen requiring.    -2/21/2024 bilateral breast MRI:  Right breast 12:00, 8 cm from nipple, 1.4 cm enhancing mass consistent with biopsy and no additional right breast lesions.  Left breast 2:00 5 cm from nipple is 8 mm masslike enhancement with central signal void artifact corresponding to biopsy site.   No additional suspicious sites in the left breast.  No internal mammary nor axillary salazar involvement on either side    -3/1/2024 [facility] medical oncology follow-up: We reviewed her MRI at multidisciplinary tumor board.  For the right breast cancer she would have surgery primarily followed by adjuvant hormone blockade but would not do Oncotype as, for the left breast for which we plan surgery upfront for the 8 mm size of tumor triple negative she will need chemo regardless.  If the left breast lesion ends up less than 1 cm node-negative then we would give her Taxotere Cytoxan x 4.    If the left breast lesion is over a centimeter but less than 2 cm, then I would add Adriamycin.  If the left breast lesion is over 2 cm pathologically or node positive, then I would add carboplatin and Keytruda.  For the right breast cancer I would give her at least 5 and, if she is tolerating, 10 years of hormone blockade.  She had gone 1 year without menses but just started spotting.  I contacted Dr. Ramirez who will see her in [facility] office today for pelvic exam and to get hormone levels and to get ultrasound scheduled.  She has COPD without pulmonary function since prior to her surgery for bariatric surgery in 2019.  She smoked until November.  She is not requiring oxygen and uses her inhalers efficiently without major symptoms.  I spoke with Dr. QUAN and he is comfortable with proceeding without pulmonary function tests and he will get preoperative ABG.  I will see her back in about a month which will give her time to hopefully have healed from her surgery and we can then make plans for adjuvant therapy based upon the above algorithm.  She will need a port but Dr. QUAN does not want to place this during the time of surgery but he will make arrangements to do that postoperatively.    -3/21/2024 evacuation left mastectomy hematomaWith bilateral skin sparing mastectomies and bilateral sentinel node injection and biopsies.  Left  breast mastectomy pathology shows no residual carcinoma and 1 benign sentinel node.  Pathologic T1 a N0 NC weakly positive essentially triple negative  Right breast mastectomy pathology shows 1.1 cm grade 3 invasive ductal carcinoma, 2 benign sentinel nodes pathologic T1c N0 ER/NC positive HER2/nabila negative.  Oncotype score 44 distant recurrence risk at 9 years 32%.  Greater than 15% absolute benefit from chemotherapy.    -4/2/2024 [facility] medical oncology follow-up: I reviewed the above bilateral mastectomy pathology reports with her.  Wounds are healing well though still with significant healing yet to do and still has drains in place and is due to see Dr. QUAN tomorrow.  Her left breast had no residual tumor and no lymph node involvement for a pathological T1a N0 weakly NC positive essentially triple negative breast cancer for which I have placed orders for Taxotere Cytoxan x 4.  For her right breast mastectomy which is healing well, she had a 1.1 cm grade 3 invasive ductal carcinoma with 2 benign sentinel nodes pathological T1c N0 ER and NC positive HER2/nabila negative for which I will treat her with Arimidex x 10 years following her chemotherapy.  We will get her to Dr. QUAN for port placement and she will have chemo preparation visit in our [place] office and we will start treatment in 2 weeks assuming Dr. QUAN has cleared her surgically.  No need for radiation given bilateral mastectomies.  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    -4/9/2024 chemotherapy education preparation and needs assessment completed    -4/12/2024 Genetic testing was negative for pathogenic mutations in BRCA1/2 and 46 additional genes on the Common Hereditary Cancers panel.      -5/2/2024 treatment began with TC for a planned 4 courses    -5/2/2024 [facility] oncology clinic follow-up: Ashlee is ready to begin adjuvant therapy with TC today for a planned 4 courses.  Her drains have been removed, she  still has sutures in place but her mastectomy scars appear well-healed.  I reviewed labs from yesterday, counts acceptable to continue treatment today.  She is taking her dexamethasone that she started yesterday pretreatment for 3 days.  Once she completes chemotherapy we will plan on Arimidex for 10 years.  Her genetic testing was negative.     - 6/14/2024 [facility] oncology telemedicine follow-up: She received cycle 1 TC on 5/2/2024.  13 minutes into her docetaxel, she reported severe bilateral low back and hip pain flushing and diaphoresis with mild abdominal discomfort and anxiousness.  Reaction medications administered.  Docetaxel restarted half rate and then increase to original rate and the remainder of the infusion well-tolerated.  She went to the emergency room 5/10/2024 with nausea vomiting and weakness lasting the previous 3 days concerned about dehydration.  5/21/2024 hemoglobin 8.3 platelets 844,000 with MCV 79.6 otherwise normal CBC and differential.  Potassium 5.3 normal creatinine otherwise unremarkable CMP.  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  As she has a very high recurrence score, I would try the Taxotere again with institution of all the rescue meds upfront and 0.5 mg IV Ativan premed.  We will do this next week in our [place] office and if she tolerates that we will see her back 3 weeks later for cycle 3 of 4.      -6/21/2024 note: Called to infusion.  Patient had had all the rescue occasions given upfront and despite that had terrible back pain.  Not as excruciating as the first cycle but still too much to handle.  No wheezing or rashes but nonetheless concerning for potential infusion allergic reaction.  In the literature looking at docetaxel after paclitaxel infusion reactions,  there was a 50% chance of reaction suggesting that it is the taxane moiety and not just the vehicle causing the reaction in all cases.  Hence, I am hesitant to use Taxol in place of Taxotere.  To that end, I will get her ejection fraction and assuming it is normal we will try 6 cycles of CAF.  Prozac has a category X potential increase of Adriamycin levels.  She is prescribed this by her primary care.  I will discontinue and substitute Effexor XR 75 mg daily which does not have this interaction.          -6/26/2024 echocardiogram ejection fraction 51-55%.  GLS -22%  CAF PEG filgrastim Pharm.D. education Debo Moran    -6/28/2024 CAF cycle 1/6    -7/25/2024 [facility] medical oncology follow-up: Tolerated cycle 1 of CAF with no side effects.  Now on Effexor instead of Prozac for the category X interaction with Prozac with no problems and feeling fairly fit other than some fatigue.  Continue 2 out of 6 CAF with follow-up 8/9/2024.  See above for subsequent plans    -8/16/2024 [facility] oncology clinic follow-up: Ashlee is doing well on current treatment with CAF with no unusual side effects.  Labs reviewed from 8/14/2024, counts acceptable to continue treatment unchanged.  She has had no further back pain with her treatments.  We will proceed today with cycle 3 of a planned 6 courses.  Since she is tolerating this well we will go ahead and move her back to our [place] location for subsequent treatments.  She will continue using emollient lotions on her hands, and looks like she may have eczema.  Once we have finished treatment recommend she see a dermatologist.  She continues to do well on Effexor after changing from Prozac.    -9/5/2024 [facility] oncology clinic follow-up: Ashlee continues to tolerate current therapy with CAF with no significant side effects.  Labs reviewed from 9/3/2024, counts acceptable to continue treatment today unchanged with cycle #4 of a planned 6 cycles.  She has been hypertensive on a few  occasions looking back through her synopsis, blood pressure today 137/91.  In review of her medication list she was prescribed metoprolol but states that she has not been taking that, she will get with her primary care provider for refill, her heart rate also has been running typically in the 100s-1 teens and we discussed that the metoprolol also will help with her tachycardia.  She will need hormonal blockade therapy once she completes chemotherapy, we plan on Arimidex for at least 5 and up to 10 years of adjuvant therapy as outlined above by Dr. Youngblood.    -9/26/2024 [facility] Oncology clinic follow-up: She has had 4 cycles thus far of a planned 6 cycles of CAF, she continues to tolerate with no significant side effects.  She has occasional constipation managed with stool softeners.  She is now back on metoprolol and her blood pressure is better.  Labs reviewed from 9/24/2024, counts acceptable to continue treatment unchanged.  We will proceed today with cycle #5.  We will see her back in 3 weeks for her final treatment.  Following that we plan on up to 10 years of adjuvant therapy with Arimidex, as previously stated no plans for radiation given bilateral mastectomies.    -10/17/2024 [facility] medical oncology APRN visit: Patient has completed 5 of 6 planned cycles of CAF.  She is tolerating treatment without any significant side effects.  She has occasional constipation that is controlled with Trulance.  She vomited this morning in the exam room but relates it to eating a hash brown for [employer] that did not sit well with her.  She has not been experiencing any nausea or vomiting although her weight is down 6 pounds, she states she is eating normally.  I reviewed her labs from today that are adequate for treatment.  Will proceed with cycle 6 today as planned.  We will see her back in 3 weeks to initiate Arimidex which we will plan on up to 10 years if tolerated.  No plans for radiation given bilateral  mastectomies.     Malignant neoplasm of overlapping sites of both breasts in female, estrogen receptor positive   5/1/2024 -  Chemotherapy    OP CENTRAL VENOUS ACCESS DEVICE Access, Care, and Maintenance (CVAD)     5/2/2024 - 6/21/2024 Chemotherapy    OP BREAST TC DOCEtaxel / Cyclophosphamide     6/21/2024 -  Chemotherapy    OP CVAD Occlusion - Alteplase     6/28/2024 -  Chemotherapy    OP BREAST FAC DOXOrubicin / Cyclophosphamide / Fluorouracil     Malignant neoplasm of overlapping sites of right female breast   3/21/2024 Initial Diagnosis    Malignant neoplasm of overlapping sites of right female breast     5/2/2024 - 6/21/2024 Chemotherapy    OP BREAST TC DOCEtaxel / Cyclophosphamide     6/28/2024 -  Chemotherapy    OP BREAST FAC DOXOrubicin / Cyclophosphamide / Fluorouracil         HISTORY OF PRESENT ILLNESS:  The patient is a 49 y.o. female, here for follow up on management of adjuvant therapy breast cancer.  No somatic complaints    Past Medical History:   Diagnosis Date   • Asthma    • Breast cancer    • COPD (chronic obstructive pulmonary disease)    • Depression     history   • GERD (gastroesophageal reflux disease)      Past Surgical History:   Procedure Laterality Date   • BARIATRIC SURGERY     • BREAST SURGERY      Double mastectomy   • ENDOSCOPY      EGD x2   • GASTRECTOMY      SLEEVE   • HEMATOMA EVACUATION TRUNK Bilateral 03/21/2024    Procedure: HEMATOMA EVACUATION TRUNK;  Surgeon: Phuong Loredo MD;  [facility];  Service: General;  Laterality: Bilateral;   • LYMPH NODE BIOPSY     • MASTECTOMY W/ SENTINEL NODE BIOPSY Bilateral 03/21/2024    Procedure: BREAST MASTECTOMY WITH SENTINEL NODE BIOPSY BILATERAL;  Surgeon: Phuong Loredo MD;  Location: [facility] OR;  Service: General;  Laterality: Bilateral;       No Known Allergies    Family History and Social History reviewed and changed as necessary    REVIEW OF SYSTEM:   No somatic complaints    PHYSICAL EXAM:  3 sutures 2 on 1 side  "and 1 on the other on her mastectomy still in place.  Otherwise nothing remarkable on exam today.    Vitals:    11/05/24 1520   BP: 147/93   Pulse: 83   Resp: 18   Temp: 98.4 °F (36.9 °C)   SpO2: 96%   Weight: 85.3 kg (188 lb)   Height: 161.3 cm (63.5\")     Vitals:    11/05/24 1520   PainSc: 0-No pain          ECOG score: 0           Vitals reviewed.    ECOG: (0) Fully Active - Able to Carry On All Pre-disease Performance Without Restriction    Lab Results   Component Value Date    HGB 10.5 (L) 10/15/2024    HCT 35.0 10/15/2024    MCV 88 10/15/2024     (H) 10/15/2024    WBC 5.6 10/15/2024    NEUTROABS 3.0 10/15/2024    LYMPHSABS 1.7 10/15/2024    MONOSABS 0.8 10/15/2024    EOSABS 0.0 10/15/2024    BASOSABS 0.1 10/15/2024       Lab Results   Component Value Date    GLUCOSE 92 10/15/2024    BUN 8 10/15/2024    CREATININE 0.94 10/15/2024     10/15/2024    K 5.2 10/15/2024     10/15/2024    CO2 24 10/15/2024    CALCIUM 10.1 10/15/2024    PROTEINTOT 6.6 06/21/2024    ALBUMIN 4.4 10/15/2024    BILITOT <0.2 10/15/2024    ALKPHOS 100 10/15/2024    AST 16 10/15/2024    ALT 8 10/15/2024             ASSESSMENT & PLAN:  1.  Bilateral breast cancer:  -Right side invasive ductal clinical T1c N0, 1.4 centimeter, ER positive ND positive HER2 negative, grade 2.  Pathologic 1.1 cm T1c N0 on mastectomy.  Recurrence score 44.  Distant recurrence risk on hormone blockade alone 32%. > 15% absolute chemotherapeutic benefit.  -Left side invasive ductal clinical T1b N0, 8 MM, ER negative, ND weakly positive, HER2/nabila negative, essentially triple negative grade 3.  Pathologic T1 a N0 with no residual tumor on mastectomy  Initial bilateral mastectomy recommended.    - Negative genetic testing.  -Received 2 cycles of TC and despite heavy premedication could not tolerate with reaction in the chair.  Hence, switch to CAF x 6 on 6/28/2024.Sixth cycle 10/17/2024.  11/5/2024 started Arimidex  2.  COPD    Oncology history " timeline:  -1/11/2024 bilateral screening mammogram [facility] imaging showed asymmetry right breast additional views needed with mass in left breast requiring additional imaging  -2/1/2024 bilateral diagnostic mammogram [facility] showed persistent focal asymmetry right 12:00 7 cm from the nipple 7 mm hypoechoic mass indistinct with irregular margins ultrasound-guided biopsy recommended.  Left breast showed 8 mm 3:00 oval mass 10 cm from the nipple that on the ultrasound was 6 x 5 x 5 mm.  Incidental 4 mm 2:00 hypoechoic mass with internal vascularity 5 cm from the nipple for which ultrasound-guided biopsy recommended  -2/5/2024 right ultrasound-guided core biopsy 7 mm mass with HydroMARK T4 shaped tissue marker placed at the biopsy site.  Left ultrasound breast core biopsy 3:00 6 mm mass and 2:00 4 mm mass with HydroMARK T3 and T4 respectively.  Right breast 12:00 lesion grade 2 invasive ductal Jimenez-Cui 6 out of 9, 4 mm.  % 3+, KS 50% 3+.  Also intermediate grade ductal carcinoma in situ  Left breast 3:00 invasive ductal carcinoma grade 3 Bloom-Cui 9 out of 9, 5-6 mm, ER 0%, KS 25% 1+, HER2/nabila negative 1+  Left breast 2:00 core biopsy fibroadenoma  -2/12/2024 office note Dr. Gregorio Ashton indicates patient with newly diagnosed bilateral breast cancer.  Screening mammogram showed 2 abnormalities in the left breast and 1 in the right.    No personal or family history of breast cancer.  Has hot flashes but not taking estrogen supplements.  -2/13/2024 cervical spine x-ray negative    -2/15/2024 [facility] medical oncology follow-up: Patient has bilateral breast cancers as outlined on routine screening mammogram without any other symptoms.  After her diagnosis, she has had muscle tension aches in the left shoulder which she has a tens unit and recent injection of steroids by her primary care.  This is distinctly in the muscle  she says and not in her bones.2/13/2024 cervical spine x-ray has C7 obscured by overlapping bone and soft tissues but otherwise unremarkable. Recent CBC and CMP had normal bone enzymes and was otherwise unremarkable.  Hence I would not make much out of the neck pain in the way of concern for metastatic disease unless this worsens.   She is under a lot of psychological strain from this diagnosis and is quite anxious and I will refer her to Ashlee Nayak are oncology psychiatrist and we will get our breast cancer navigator working with her.  The 7 mm right breast tumor is grade 2  % 3+, AR 50% 3+, HER2/nabila 0+.  The left breast is clinically 6 mm grade 3 invasive ductal ER negative AR weakly positive HER2/nabila 1 negative essentially triple negative.  We will get MRI of her breasts and if the lesion in the left breast turns out to be larger than 1 cm mammographically or is node positive then we would give neoadjuvant therapy.  If not, both for the 7 mm right breast and the 8 mm left breast tumor I would consider primary resection.  She has just started a job with "[employer] in [place] and her insurance changes to "[employer] in 2 weeks and she wants her surgery done in the [facility] facility for insurance reasons.  I will discuss this with Dr. Ashton (he was scrubbed when I called today) whose skills I recommended to her but we will make appropriate referrals per her request.  With her triple negativity she will also get genetic counseling.  Absent any other somatic complaints and assuming her neck continues to improve I would do no additional staging imaging in the absence of such symptoms.  We will also present her at our multidisciplinary tumor board once the MRI is completed for final decision making.She is committed to bilateral mammograms regardless of the results of the MRI or genetic testing.  Genetic testing is done on all triple negatives.She does have a history of gastric sleeve but still needs to lose  weight.  She also carries a diagnosis of COPD and I am not sure who has managed that and Dr. QUAN may need her cleared by primary care/pulmonary before surgery but I will leave it to him.  She is not oxygen requiring.    -2/21/2024 bilateral breast MRI:  Right breast 12:00, 8 cm from nipple, 1.4 cm enhancing mass consistent with biopsy and no additional right breast lesions.  Left breast 2:00 5 cm from nipple is 8 mm masslike enhancement with central signal void artifact corresponding to biopsy site.  No additional suspicious sites in the left breast.  No internal mammary nor axillary salazar involvement on either side    -3/1/2024 [facility] oncology follow-up: We reviewed her MRI at multidisciplinary tumor board.  For the right breast cancer she would have surgery primarily followed by adjuvant hormone blockade but would not do Oncotype as, for the left breast for which we plan surgery upfront for the 8 mm size of tumor triple negative she will need chemo regardless.  If the left breast lesion ends up less than 1 cm node-negative then we would give her Taxotere Cytoxan x 4.    If the left breast lesion is over a centimeter but less than 2 cm, then I would add Adriamycin.  If the left breast lesion is over 2 cm pathologically or node positive, then I would add carboplatin and Keytruda.  For the right breast cancer I would give her at least 5 and, if she is tolerating, 10 years of hormone blockade.  She had gone 1 year without menses but just started spotting.  I contacted Dr. Ramirez who will see her in [facility] office today for pelvic exam and to get hormone levels and to get ultrasound scheduled.  She has COPD without pulmonary function since prior to her surgery for bariatric surgery in 2019.  She smoked until November.  She is not requiring oxygen and uses her inhalers efficiently without major symptoms.  I spoke with Dr. QUAN and he is comfortable with proceeding without pulmonary function tests and he  will get preoperative ABG.  I will see her back in about a month which will give her time to hopefully have healed from her surgery and we can then make plans for adjuvant therapy based upon the above algorithm.  She will need a port but Dr. QUAN does not want to place this during the time of surgery but he will make arrangements to do that postoperatively.    -3/21/2024 evacuation left mastectomy hematomaWith bilateral skin sparing mastectomies and bilateral sentinel node injection and biopsies.  Left breast mastectomy pathology shows no residual carcinoma and 1 benign sentinel node.  Pathologic T1 a N0 AZ weakly positive essentially triple negative  Right breast mastectomy pathology shows 1.1 cm grade 3 invasive ductal carcinoma, 2 benign sentinel nodes pathologic T1c N0 ER/AZ positive HER2/nabila negative.  Oncotype score 44 distant recurrence risk at 9 years 32%.  Greater than 15% absolute benefit from chemotherapy.    -4/2/2024 [facility] medical oncology follow-up: I reviewed the above bilateral mastectomy pathology reports with her.  Wounds are healing well though still with significant healing yet to do and still has drains in place and is due to see Dr. QUAN tomorrow.  Her left breast had no residual tumor and no lymph node involvement for a pathological T1a N0 weakly AZ positive essentially triple negative breast cancer for which I have placed orders for Taxotere Cytoxan x 4.  For her right breast mastectomy which is healing well, she had a 1.1 cm grade 3 invasive ductal carcinoma with 2 benign sentinel nodes pathological T1c N0 ER and AZ positive HER2/nabila negative for which I will treat her with Arimidex x 10 years following her chemotherapy.  We will get her to Dr. QUAN for port placement and she will have chemo preparation visit in our [place] office and we will start treatment in 2 weeks assuming Dr. QUAN has cleared her surgically.  No need for radiation given bilateral mastectomies.  She also needs genetic  testing with bilateral disease 1 of which was triple negative.    -4/9/2024 chemotherapy education preparation and needs assessment completed    -4/12/2024 Genetic testing was negative for pathogenic mutations in BRCA1/2 and 46 additional genes on the Common Hereditary Cancers panel.      -5/2/2024 treatment began with TC for a planned 4 courses    -5/2/2024 [facility] oncology clinic follow-up: Ashlee is ready to begin adjuvant therapy with TC today for a planned 4 courses.  Her drains have been removed, she still has sutures in place but her mastectomy scars appear well-healed.  I reviewed labs from yesterday, counts acceptable to continue treatment today.  She is taking her dexamethasone that she started yesterday pretreatment for 3 days.  Once she completes chemotherapy we will plan on Arimidex for 10 years.  Her genetic testing was negative.     - 6/14/2024 [facility] medical oncology telemedicine follow-up: She received cycle 1 TC on 5/2/2024.  13 minutes into her docetaxel, she reported severe bilateral low back and hip pain flushing and diaphoresis with mild abdominal discomfort and anxiousness.  Reaction medications administered.  Docetaxel restarted half rate and then increase to original rate and the remainder of the infusion well-tolerated.  She went to the emergency room 5/10/2024 with nausea vomiting and weakness lasting the previous 3 days concerned about dehydration.  5/21/2024 hemoglobin 8.3 platelets 844,000 with MCV 79.6 otherwise normal CBC and differential.  Potassium 5.3 normal creatinine otherwise unremarkable CMP.  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  As she has a very high recurrence score, I would try the Taxotere again with institution of all the rescue meds upfront and 0.5 mg IV Ativan premed.   We will do this next week in our [place] office and if she tolerates that we will see her back 3 weeks later for cycle 3 of 4.      -6/21/2024 note: Called to infusion.  Patient had had all the rescue occasions given upfront and despite that had terrible back pain.  Not as excruciating as the first cycle but still too much to handle.  No wheezing or rashes but nonetheless concerning for potential infusion allergic reaction.  In the literature looking at docetaxel after paclitaxel infusion reactions, there was a 50% chance of reaction suggesting that it is the taxane moiety and not just the vehicle causing the reaction in all cases.  Hence, I am hesitant to use Taxol in place of Taxotere.  To that end, I will get her ejection fraction and assuming it is normal we will try 6 cycles of CAF.  Prozac has a category X potential increase of Adriamycin levels.  She is prescribed this by her primary care.  I will discontinue and substitute Effexor XR 75 mg daily which does not have this interaction.          -6/26/2024 echocardiogram ejection fraction 51-55%.  GLS -22%  CAF PEG filgrastim Pharm.D. education Debo Moran    -6/28/2024 CAF cycle 1/6    -7/25/2024 [facility] oncology follow-up: Tolerated cycle 1 of CAF with no side effects.  Now on Effexor instead of Prozac for the category X interaction with Prozac with no problems and feeling fairly fit other than some fatigue.  Continue 2 out of 6 CAF with follow-up 8/9/2024.  See above for subsequent plans    -8/16/2024 [facility] oncology clinic follow-up: Ashlee is doing well on current treatment with CAF with no unusual side effects.  Labs reviewed from 8/14/2024, counts acceptable to continue treatment unchanged.  She has had no further back pain with her treatments.  We will proceed today with cycle 3 of a planned 6 courses.  Since she is tolerating this well we will go ahead and move her back to our [place] location for subsequent treatments.  She will continue  using emollient lotions on her hands, and looks like she may have eczema.  Once we have finished treatment recommend she see a dermatologist.  She continues to do well on Effexor after changing from Prozac.    -9/5/2024 [facility] oncology clinic follow-up: Ashlee continues to tolerate current therapy with CAF with no significant side effects.  Labs reviewed from 9/3/2024, counts acceptable to continue treatment today unchanged with cycle #4 of a planned 6 cycles.  She has been hypertensive on a few occasions looking back through her synopsis, blood pressure today 137/91.  In review of her medication list she was prescribed metoprolol but states that she has not been taking that, she will get with her primary care provider for refill, her heart rate also has been running typically in the 100s-1 teens and we discussed that the metoprolol also will help with her tachycardia.  She will need hormonal blockade therapy once she completes chemotherapy, we plan on Arimidex for at least 5 and up to 10 years of adjuvant therapy as outlined above by Dr. Youngblood.    -9/26/2024 [facility] Oncology clinic follow-up: She has had 4 cycles thus far of a planned 6 cycles of CAF, she continues to tolerate with no significant side effects.  She has occasional constipation managed with stool softeners.  She is now back on metoprolol and her blood pressure is better.  Labs reviewed from 9/24/2024, counts acceptable to continue treatment unchanged.  We will proceed today with cycle #5.  We will see her back in 3 weeks for her final treatment.  Following that we plan on up to 10 years of adjuvant therapy with Arimidex, as previously stated no plans for radiation given bilateral mastectomies.    -10/17/2024 [facility] medical oncology APRN visit: Patient has completed 5 of 6 planned cycles of CAF.  She is tolerating treatment without any significant side effects.  She has occasional constipation that is controlled with Trulance.  She vomited this  morning in the exam room but relates it to eating a hash brown for [employer] that did not sit well with her.  She has not been experiencing any nausea or vomiting although her weight is down 6 pounds, she states she is eating normally.  I reviewed her labs from today that are adequate for treatment.  Will proceed with cycle 6 today as planned.  We will see her back in 3 weeks to initiate Arimidex which we will plan on up to 10 years if tolerated.  No plans for radiation given bilateral mastectomies.    -11/5/2024 [facility] medical oncology follow-up: Completed adjuvant therapy 6 cycles of CAF after 2 cycles of TC which she did not tolerate and hence switched to CAF.  No somatic complaints.  Has 2 sutures on one side 1 on the other her mastectomy sites and needs her port out.  Will get her to Dr. QUAN for all of that.  Did not have suture removal kit in our clinic in [place].  She will see my nurse practitioner back in 3 months for survivorship visit.  No radiation given bilateral mastectomies.  Repeat CBC prior to return    Time of care today inclusive of time spent today prior to her arrival reviewing interval data since I last saw her and during visit translating that information to her and putting out plans as outlined above and after visit instituting plans took 50 minutes patient care time throughout the day today.  Corky Youngblood MD    11/05/2024

## 2024-11-08 ENCOUNTER — CLINICAL SUPPORT (OUTPATIENT)
Dept: FAMILY MEDICINE CLINIC | Facility: CLINIC | Age: 49
End: 2024-11-08
Payer: COMMERCIAL

## 2024-11-08 DIAGNOSIS — Z23 NEEDS FLU SHOT: Primary | ICD-10-CM

## 2025-02-10 ENCOUNTER — LAB (OUTPATIENT)
Dept: FAMILY MEDICINE CLINIC | Facility: CLINIC | Age: 50
End: 2025-02-10
Payer: COMMERCIAL

## 2025-02-11 LAB
BASOPHILS # BLD AUTO: 0 X10E3/UL (ref 0–0.2)
BASOPHILS NFR BLD AUTO: 1 %
EOSINOPHIL # BLD AUTO: 0.1 X10E3/UL (ref 0–0.4)
EOSINOPHIL NFR BLD AUTO: 2 %
ERYTHROCYTE [DISTWIDTH] IN BLOOD BY AUTOMATED COUNT: 15.3 % (ref 11.7–15.4)
HCT VFR BLD AUTO: 37.6 % (ref 34–46.6)
HGB BLD-MCNC: 11.1 G/DL (ref 11.1–15.9)
IMM GRANULOCYTES # BLD AUTO: 0 X10E3/UL (ref 0–0.1)
IMM GRANULOCYTES NFR BLD AUTO: 0 %
LYMPHOCYTES # BLD AUTO: 2 X10E3/UL (ref 0.7–3.1)
LYMPHOCYTES NFR BLD AUTO: 32 %
MCH RBC QN AUTO: 23.2 PG (ref 26.6–33)
MCHC RBC AUTO-ENTMCNC: 29.5 G/DL (ref 31.5–35.7)
MCV RBC AUTO: 79 FL (ref 79–97)
MONOCYTES # BLD AUTO: 0.4 X10E3/UL (ref 0.1–0.9)
MONOCYTES NFR BLD AUTO: 7 %
NEUTROPHILS # BLD AUTO: 3.6 X10E3/UL (ref 1.4–7)
NEUTROPHILS NFR BLD AUTO: 58 %
PLATELET # BLD AUTO: 503 X10E3/UL (ref 150–450)
RBC # BLD AUTO: 4.78 X10E6/UL (ref 3.77–5.28)
WBC # BLD AUTO: 6.2 X10E3/UL (ref 3.4–10.8)

## 2025-02-12 ENCOUNTER — TELEPHONE (OUTPATIENT)
Dept: ONCOLOGY | Facility: CLINIC | Age: 50
End: 2025-02-12
Payer: COMMERCIAL
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
## 2025-04-14 ENCOUNTER — OFFICE VISIT (OUTPATIENT)
Dept: FAMILY MEDICINE CLINIC | Facility: CLINIC | Age: 50
End: 2025-04-14
Payer: COMMERCIAL

## 2025-04-14 VITALS
TEMPERATURE: 100.6 F | DIASTOLIC BLOOD PRESSURE: 82 MMHG | HEART RATE: 104 BPM | WEIGHT: 197 LBS | OXYGEN SATURATION: 97 % | BODY MASS INDEX: 34.35 KG/M2 | SYSTOLIC BLOOD PRESSURE: 136 MMHG

## 2025-04-14 DIAGNOSIS — K04.7 ABSCESSED TOOTH: Primary | ICD-10-CM

## 2025-04-14 DIAGNOSIS — K08.89 PAIN, DENTAL: ICD-10-CM

## 2025-04-14 PROCEDURE — 99213 OFFICE O/P EST LOW 20 MIN: CPT | Performed by: NURSE PRACTITIONER

## 2025-04-14 RX ORDER — AMOXICILLIN 875 MG/1
875 TABLET, COATED ORAL 2 TIMES DAILY
Qty: 20 TABLET | Refills: 0 | Status: SHIPPED | OUTPATIENT
Start: 2025-04-14

## 2025-04-14 NOTE — PROGRESS NOTES
Chief Complaint  Dental Pain    Subjective          Ashlee Marte presents to [facility]  History of Present Illness  Pt has had dental pain for the past few days. She has had an abscessed tooth in the past with similar symptoms.       History of Present Illness      Objective   Vital Signs:   There were no vitals taken for this visit.    There is no height or weight on file to calculate BMI.    Review of Systems   Constitutional:  Negative for fatigue and fever.   Respiratory:  Negative for shortness of breath.    Cardiovascular:  Negative for chest pain, palpitations and leg swelling.   Neurological:  Negative for syncope.   Psychiatric/Behavioral:  The patient is not nervous/anxious.           Current Outpatient Medications:     albuterol sulfate HFA (Ventolin HFA) 108 (90 Base) MCG/ACT inhaler, Inhale 2 puffs Every 4 (Four) Hours As Needed for wheezing, Disp: 18 g, Rfl: 11    albuterol sulfate  (90 Base) MCG/ACT inhaler, Inhale 2 puffs Every 4 (Four) Hours As Needed for Wheezing., Disp: 18 g, Rfl: 11    amoxicillin (AMOXIL) 875 MG tablet, Take 1 tablet by mouth 2 (Two) Times a Day., Disp: 20 tablet, Rfl: 0    anastrozole (ARIMIDEX) 1 MG tablet, Take 1 tablet by mouth Daily., Disp: 90 tablet, Rfl: 3    brompheniramine-pseudoephedrine-DM 30-2-10 MG/5ML syrup, Take 5 mL by mouth 4 (Four) Times a Day As Needed for Cough or Congestion., Disp: 120 mL, Rfl: 0    budesonide-formoterol (Symbicort) 80-4.5 MCG/ACT inhaler, Inhale 2 puffs 2 (Two) Times a Day., Disp: 10.2 g, Rfl: 12    cholecalciferol (VITAMIN D3) 25 MCG (1000 UT) tablet, Take 1 tablet by mouth Daily., Disp: 90 tablet, Rfl: 3    cloNIDine (CATAPRES) 0.1 MG tablet, TAKE 1 TABLET BY MOUTH 2 TIMES A DAY, Disp: 60 tablet, Rfl: 0    folic acid (FOLVITE) 1 MG tablet, Take 1 tablet by mouth Daily., Disp: 90 tablet, Rfl: 1    folic acid (FOLVITE) 1 MG tablet, Take 1 tablet by mouth Daily., Disp: 90 tablet, Rfl: 1     lidocaine-prilocaine (EMLA) 2.5-2.5 % cream, Apply 1 Application topically to the appropriate area as directed As Needed 45-60 minutes prior to port access.  Cover with saran/plastic wrap., Disp: 30 g, Rfl: 3    LORazepam (Ativan) 1 MG tablet, Take 1 tablet by mouth Every 8 (Eight) Hours As Needed for Anxiety., Disp: 30 tablet, Rfl: 0    metoprolol succinate XL (Toprol XL) 25 MG 24 hr tablet, Take 1 tablet by mouth Daily., Disp: 30 tablet, Rfl: 2    OLANZapine (zyPREXA) 5 MG tablet, Take 1 tablet by mouth Every Night for 4 doses starting night of chemotherapy., Disp: 4 tablet, Rfl: 5    omeprazole (priLOSEC) 40 MG capsule, Take 1 capsule by mouth 2 (Two) Times a Day., Disp: 180 capsule, Rfl: 3    omeprazole (priLOSEC) 40 MG capsule, Take 1 capsule by mouth 2 (Two) Times a Day., Disp: 180 capsule, Rfl: 3    ondansetron (ZOFRAN) 8 MG tablet, Take 1 tablet by mouth 3 (Three) Times a Day As Needed for Nausea or Vomiting., Disp: 30 tablet, Rfl: 5    Plecanatide (Trulance) 3 MG tablet, Take 1 tablet by mouth Daily., Disp: 90 tablet, Rfl: 3    predniSONE (DELTASONE) 20 MG tablet, 3 QD x 3 days, 2 QD x 3 days, 1 QD x 3 days, Disp: 18 tablet, Rfl: 0    promethazine-dextromethorphan (PROMETHAZINE-DM) 6.25-15 MG/5ML syrup, Take 5 mL by mouth 4 (Four) Times a Day As Needed for Cough., Disp: 120 mL, Rfl: 0    venlafaxine XR (EFFEXOR-XR) 75 MG 24 hr capsule, Take 1 capsule by mouth Daily., Disp: 30 capsule, Rfl: 11    vitamin D (ERGOCALCIFEROL) 1.25 MG (01028 UT) capsule capsule, Take 1 capsule by mouth 1 (One) Time Per Week., Disp: 12 capsule, Rfl: 1      Allergies: Patient has no known allergies.    Physical Exam  Constitutional:       Appearance: Normal appearance.   HENT:      Head: Normocephalic.      Mouth/Throat:      Comments: Upper right dental pain/swelling  Eyes:      Conjunctiva/sclera: Conjunctivae normal.      Pupils: Pupils are equal, round, and reactive to light.   Cardiovascular:      Rate and Rhythm: Normal rate  and regular rhythm.      Heart sounds: Normal heart sounds.   Pulmonary:      Effort: Pulmonary effort is normal.      Breath sounds: Normal breath sounds.   Abdominal:      Tenderness: There is no abdominal tenderness.   Musculoskeletal:         General: Normal range of motion.   Skin:     General: Skin is warm and dry.      Capillary Refill: Capillary refill takes less than 2 seconds.   Neurological:      General: No focal deficit present.      Mental Status: She is alert and oriented to person, place, and time.   Psychiatric:         Mood and Affect: Mood normal.         Behavior: Behavior normal.         Thought Content: Thought content normal.         Judgment: Judgment normal.          Physical Exam         Result Review :                Results            Assessment and Plan    Diagnoses and all orders for this visit:    1. Abscessed tooth (Primary)  Comments:  Finish antibiotics. F/U with dentist ASAP. RTC for worsened sx and if not improving in 1 week.  Orders:  -     amoxicillin (AMOXIL) 875 MG tablet; Take 1 tablet by mouth 2 (Two) Times a Day.  Dispense: 20 tablet; Refill: 0    2. Pain, dental                  Follow Up   Return in about 1 week (around 4/21/2025) for if not improving or sooner if symptoms worsen.  Patient was given instructions and counseling regarding her condition or for health maintenance advice. Please see specific information pulled into the AVS if appropriate.     SHAVON Mcclain

## 2025-04-30 ENCOUNTER — OFFICE VISIT (OUTPATIENT)
Dept: FAMILY MEDICINE CLINIC | Facility: CLINIC | Age: 50
End: 2025-04-30
Payer: COMMERCIAL

## 2025-04-30 VITALS
BODY MASS INDEX: 34.23 KG/M2 | DIASTOLIC BLOOD PRESSURE: 88 MMHG | SYSTOLIC BLOOD PRESSURE: 158 MMHG | TEMPERATURE: 98.1 F | OXYGEN SATURATION: 94 % | WEIGHT: 200.5 LBS | HEIGHT: 64 IN

## 2025-04-30 DIAGNOSIS — K08.89 PAIN, DENTAL: Primary | ICD-10-CM

## 2025-04-30 PROCEDURE — 99213 OFFICE O/P EST LOW 20 MIN: CPT | Performed by: NURSE PRACTITIONER

## 2025-04-30 RX ORDER — HYDROCODONE BITARTRATE AND ACETAMINOPHEN 7.5; 325 MG/1; MG/1
1 TABLET ORAL EVERY 8 HOURS PRN
Qty: 12 TABLET | Refills: 0 | Status: SHIPPED | OUTPATIENT
Start: 2025-04-30

## 2025-04-30 RX ORDER — CLINDAMYCIN HYDROCHLORIDE 300 MG/1
300 CAPSULE ORAL 3 TIMES DAILY
Qty: 30 CAPSULE | Refills: 0 | Status: SHIPPED | OUTPATIENT
Start: 2025-04-30

## 2025-04-30 NOTE — PROGRESS NOTES
Chief Complaint  Dental Pain    Subjective          Ashlee Marte presents to [facility]  History of Present Illness  Pt has had right upper dental pain and swelling since yesterday. She has been told that she needs to have teeth pulled but has not been able to afford this yet.       History of Present Illness      Objective   Vital Signs:   There were no vitals taken for this visit.    There is no height or weight on file to calculate BMI.    Review of Systems   Constitutional:  Negative for fatigue and fever.   HENT:  Positive for dental problem.    Respiratory:  Negative for shortness of breath.    Cardiovascular:  Negative for chest pain, palpitations and leg swelling.   Neurological:  Negative for syncope.   Psychiatric/Behavioral:  The patient is not nervous/anxious.           Current Outpatient Medications:     albuterol sulfate HFA (Ventolin HFA) 108 (90 Base) MCG/ACT inhaler, Inhale 2 puffs Every 4 (Four) Hours As Needed for wheezing, Disp: 18 g, Rfl: 11    albuterol sulfate  (90 Base) MCG/ACT inhaler, Inhale 2 puffs Every 4 (Four) Hours As Needed for Wheezing., Disp: 18 g, Rfl: 11    anastrozole (ARIMIDEX) 1 MG tablet, Take 1 tablet by mouth Daily., Disp: 90 tablet, Rfl: 3    brompheniramine-pseudoephedrine-DM 30-2-10 MG/5ML syrup, Take 5 mL by mouth 4 (Four) Times a Day As Needed for Cough or Congestion., Disp: 120 mL, Rfl: 0    budesonide-formoterol (Symbicort) 80-4.5 MCG/ACT inhaler, Inhale 2 puffs 2 (Two) Times a Day., Disp: 10.2 g, Rfl: 12    cholecalciferol (VITAMIN D3) 25 MCG (1000 UT) tablet, Take 1 tablet by mouth Daily., Disp: 90 tablet, Rfl: 3    clindamycin (Cleocin) 300 MG capsule, Take 1 capsule by mouth 3 (Three) Times a Day., Disp: 30 capsule, Rfl: 0    cloNIDine (CATAPRES) 0.1 MG tablet, TAKE 1 TABLET BY MOUTH 2 TIMES A DAY, Disp: 60 tablet, Rfl: 0    folic acid (FOLVITE) 1 MG tablet, Take 1 tablet by mouth Daily., Disp: 90 tablet, Rfl: 1    folic  acid (FOLVITE) 1 MG tablet, Take 1 tablet by mouth Daily., Disp: 90 tablet, Rfl: 1    HYDROcodone-acetaminophen (NORCO) 7.5-325 MG per tablet, Take 1 tablet by mouth Every 8 (Eight) Hours As Needed for Moderate Pain., Disp: 12 tablet, Rfl: 0    lidocaine-prilocaine (EMLA) 2.5-2.5 % cream, Apply 1 Application topically to the appropriate area as directed As Needed 45-60 minutes prior to port access.  Cover with saran/plastic wrap., Disp: 30 g, Rfl: 3    LORazepam (Ativan) 1 MG tablet, Take 1 tablet by mouth Every 8 (Eight) Hours As Needed for Anxiety., Disp: 30 tablet, Rfl: 0    metoprolol succinate XL (Toprol XL) 25 MG 24 hr tablet, Take 1 tablet by mouth Daily., Disp: 30 tablet, Rfl: 2    OLANZapine (zyPREXA) 5 MG tablet, Take 1 tablet by mouth Every Night for 4 doses starting night of chemotherapy., Disp: 4 tablet, Rfl: 5    omeprazole (priLOSEC) 40 MG capsule, Take 1 capsule by mouth 2 (Two) Times a Day., Disp: 180 capsule, Rfl: 3    omeprazole (priLOSEC) 40 MG capsule, Take 1 capsule by mouth 2 (Two) Times a Day., Disp: 180 capsule, Rfl: 3    ondansetron (ZOFRAN) 8 MG tablet, Take 1 tablet by mouth 3 (Three) Times a Day As Needed for Nausea or Vomiting., Disp: 30 tablet, Rfl: 5    Plecanatide (Trulance) 3 MG tablet, Take 1 tablet by mouth Daily., Disp: 90 tablet, Rfl: 3    predniSONE (DELTASONE) 20 MG tablet, 3 QD x 3 days, 2 QD x 3 days, 1 QD x 3 days (Patient not taking: Reported on 4/14/2025), Disp: 18 tablet, Rfl: 0    promethazine-dextromethorphan (PROMETHAZINE-DM) 6.25-15 MG/5ML syrup, Take 5 mL by mouth 4 (Four) Times a Day As Needed for Cough. (Patient not taking: Reported on 4/14/2025), Disp: 120 mL, Rfl: 0    venlafaxine XR (EFFEXOR-XR) 75 MG 24 hr capsule, Take 1 capsule by mouth Daily., Disp: 30 capsule, Rfl: 11    vitamin D (ERGOCALCIFEROL) 1.25 MG (39222 UT) capsule capsule, Take 1 capsule by mouth 1 (One) Time Per Week., Disp: 12 capsule, Rfl: 1      Allergies: Patient has no known  allergies.    Physical Exam  Constitutional:       Appearance: Normal appearance.   HENT:      Head: Normocephalic.   Eyes:      Conjunctiva/sclera: Conjunctivae normal.      Pupils: Pupils are equal, round, and reactive to light.   Cardiovascular:      Rate and Rhythm: Normal rate and regular rhythm.      Heart sounds: Normal heart sounds.   Pulmonary:      Effort: Pulmonary effort is normal.      Breath sounds: Normal breath sounds.   Abdominal:      Tenderness: There is no abdominal tenderness.   Musculoskeletal:         General: Normal range of motion.   Skin:     General: Skin is warm and dry.      Capillary Refill: Capillary refill takes less than 2 seconds.   Neurological:      General: No focal deficit present.      Mental Status: She is alert and oriented to person, place, and time.   Psychiatric:         Mood and Affect: Mood normal.         Behavior: Behavior normal.         Thought Content: Thought content normal.         Judgment: Judgment normal.          Physical Exam         Result Review :                Results            Assessment and Plan    Diagnoses and all orders for this visit:    1. Pain, dental (Primary)  Comments:  Finish antibiotics. Norco for severe pain. F/U with dentist. RTC for worsened sx and if not improving in 1 week.  Orders:  -     HYDROcodone-acetaminophen (NORCO) 7.5-325 MG per tablet; Take 1 tablet by mouth Every 8 (Eight) Hours As Needed for Moderate Pain.  Dispense: 12 tablet; Refill: 0  -     clindamycin (Cleocin) 300 MG capsule; Take 1 capsule by mouth 3 (Three) Times a Day.  Dispense: 30 capsule; Refill: 0                  Follow Up   No follow-ups on file.  Patient was given instructions and counseling regarding her condition or for health maintenance advice. Please see specific information pulled into the AVS if appropriate.     SHAVON Mcclain

## 2025-05-28 ENCOUNTER — OFFICE VISIT (OUTPATIENT)
Dept: FAMILY MEDICINE CLINIC | Facility: CLINIC | Age: 50
End: 2025-05-28
Payer: COMMERCIAL

## 2025-05-28 VITALS
OXYGEN SATURATION: 97 % | BODY MASS INDEX: 33.95 KG/M2 | HEART RATE: 102 BPM | DIASTOLIC BLOOD PRESSURE: 84 MMHG | TEMPERATURE: 98.3 F | SYSTOLIC BLOOD PRESSURE: 122 MMHG | WEIGHT: 197.8 LBS

## 2025-05-28 DIAGNOSIS — J44.9 CHRONIC OBSTRUCTIVE PULMONARY DISEASE, UNSPECIFIED COPD TYPE: ICD-10-CM

## 2025-05-28 DIAGNOSIS — J06.9 ACUTE URI: Primary | ICD-10-CM

## 2025-05-28 PROCEDURE — 99214 OFFICE O/P EST MOD 30 MIN: CPT | Performed by: NURSE PRACTITIONER

## 2025-05-28 RX ORDER — LEVOCETIRIZINE DIHYDROCHLORIDE 5 MG/1
5 TABLET, FILM COATED ORAL EVERY EVENING
Qty: 90 TABLET | Refills: 1 | Status: SHIPPED | OUTPATIENT
Start: 2025-05-28

## 2025-05-28 RX ORDER — DOXYCYCLINE 100 MG/1
100 CAPSULE ORAL 2 TIMES DAILY
Qty: 20 CAPSULE | Refills: 0 | Status: SHIPPED | OUTPATIENT
Start: 2025-05-28

## 2025-05-28 RX ORDER — BENZONATATE 100 MG/1
100 CAPSULE ORAL 3 TIMES DAILY PRN
Qty: 30 CAPSULE | Refills: 0 | Status: SHIPPED | OUTPATIENT
Start: 2025-05-28

## 2025-05-28 RX ORDER — PREDNISONE 20 MG/1
TABLET ORAL
Qty: 18 TABLET | Refills: 0 | Status: SHIPPED | OUTPATIENT
Start: 2025-05-28 | End: 2025-06-06

## 2025-05-28 NOTE — PROGRESS NOTES
Chief Complaint  URI (Pt is here for uri sxs onset Monday. Complains of cough, congestion and drainage. )    Subjective          Ashlee Marte presents to [facility]  History of Present Illness  Pt has had cough and congestion since Monday. She denies fever, chills, or myalgias. She has been wheezing at times.   URI   Associated symptoms include congestion, coughing, sinus pain and wheezing. Pertinent negatives include no chest pain.       History of Present Illness      Objective   Vital Signs:   /84   Pulse 102   Temp 98.3 °F (36.8 °C) (Oral)   Wt 89.7 kg (197 lb 12.8 oz)   SpO2 97%   BMI 33.95 kg/m²     Body mass index is 33.95 kg/m².    Review of Systems   Constitutional:  Negative for fatigue and fever.   HENT:  Positive for congestion.    Respiratory:  Positive for cough, shortness of breath and wheezing.    Cardiovascular:  Negative for chest pain, palpitations and leg swelling.   Neurological:  Negative for syncope.   Psychiatric/Behavioral:  The patient is not nervous/anxious.           Current Outpatient Medications:     albuterol sulfate HFA (Ventolin HFA) 108 (90 Base) MCG/ACT inhaler, Inhale 2 puffs Every 4 (Four) Hours As Needed for wheezing, Disp: 18 g, Rfl: 11    albuterol sulfate  (90 Base) MCG/ACT inhaler, Inhale 2 puffs Every 4 (Four) Hours As Needed for Wheezing., Disp: 18 g, Rfl: 11    anastrozole (ARIMIDEX) 1 MG tablet, Take 1 tablet by mouth Daily., Disp: 90 tablet, Rfl: 3    budesonide-formoterol (Symbicort) 80-4.5 MCG/ACT inhaler, Inhale 2 puffs 2 (Two) Times a Day., Disp: 10.2 g, Rfl: 12    cholecalciferol (VITAMIN D3) 25 MCG (1000 UT) tablet, Take 1 tablet by mouth Daily., Disp: 90 tablet, Rfl: 3    cloNIDine (CATAPRES) 0.1 MG tablet, TAKE 1 TABLET BY MOUTH 2 TIMES A DAY, Disp: 60 tablet, Rfl: 0    folic acid (FOLVITE) 1 MG tablet, Take 1 tablet by mouth Daily., Disp: 90 tablet, Rfl: 1    folic acid (FOLVITE) 1 MG tablet, Take 1 tablet by  mouth Daily., Disp: 90 tablet, Rfl: 1    HYDROcodone-acetaminophen (NORCO) 7.5-325 MG per tablet, Take 1 tablet by mouth Every 8 (Eight) Hours As Needed for Moderate Pain., Disp: 12 tablet, Rfl: 0    lidocaine-prilocaine (EMLA) 2.5-2.5 % cream, Apply 1 Application topically to the appropriate area as directed As Needed 45-60 minutes prior to port access.  Cover with saran/plastic wrap., Disp: 30 g, Rfl: 3    LORazepam (Ativan) 1 MG tablet, Take 1 tablet by mouth Every 8 (Eight) Hours As Needed for Anxiety., Disp: 30 tablet, Rfl: 0    metoprolol succinate XL (Toprol XL) 25 MG 24 hr tablet, Take 1 tablet by mouth Daily., Disp: 30 tablet, Rfl: 2    OLANZapine (zyPREXA) 5 MG tablet, Take 1 tablet by mouth Every Night for 4 doses starting night of chemotherapy., Disp: 4 tablet, Rfl: 5    omeprazole (priLOSEC) 40 MG capsule, Take 1 capsule by mouth 2 (Two) Times a Day., Disp: 180 capsule, Rfl: 3    ondansetron (ZOFRAN) 8 MG tablet, Take 1 tablet by mouth 3 (Three) Times a Day As Needed for Nausea or Vomiting., Disp: 30 tablet, Rfl: 5    Plecanatide (Trulance) 3 MG tablet, Take 1 tablet by mouth Daily., Disp: 90 tablet, Rfl: 3    venlafaxine XR (EFFEXOR-XR) 75 MG 24 hr capsule, Take 1 capsule by mouth Daily., Disp: 30 capsule, Rfl: 11    vitamin D (ERGOCALCIFEROL) 1.25 MG (71655 UT) capsule capsule, Take 1 capsule by mouth 1 (One) Time Per Week., Disp: 12 capsule, Rfl: 1    benzonatate (Tessalon Perles) 100 MG capsule, Take 1 capsule by mouth 3 (Three) Times a Day As Needed for Cough., Disp: 30 capsule, Rfl: 0    doxycycline (VIBRAMYCIN) 100 MG capsule, Take 1 capsule by mouth 2 (Two) Times a Day., Disp: 20 capsule, Rfl: 0    levocetirizine (XYZAL) 5 MG tablet, Take 1 tablet by mouth Every Evening., Disp: 90 tablet, Rfl: 1    predniSONE (DELTASONE) 20 MG tablet, Take 3 tablets by mouth Daily for 3 days, THEN 2 tablets Daily for 3 days, THEN 1 tablet Daily for 3 days., Disp: 18 tablet, Rfl: 0      Allergies: Patient has no  known allergies.    Physical Exam  Constitutional:       Appearance: Normal appearance.   HENT:      Right Ear: Tympanic membrane and ear canal normal.      Left Ear: Tympanic membrane and ear canal normal.      Nose: Nose normal.      Mouth/Throat:      Pharynx: Posterior oropharyngeal erythema present.   Eyes:      Extraocular Movements: Extraocular movements intact.      Conjunctiva/sclera: Conjunctivae normal.      Pupils: Pupils are equal, round, and reactive to light.   Cardiovascular:      Rate and Rhythm: Normal rate and regular rhythm.      Heart sounds: Normal heart sounds.   Pulmonary:      Effort: Pulmonary effort is normal. No accessory muscle usage.      Breath sounds: Normal breath sounds.   Lymphadenopathy:      Cervical: No cervical adenopathy.   Skin:     General: Skin is warm and dry.   Neurological:      General: No focal deficit present.      Mental Status: She is alert.   Psychiatric:         Mood and Affect: Mood normal.         Behavior: Behavior normal.         Thought Content: Thought content normal.         Judgment: Judgment normal.          Physical Exam         Result Review :                Results            Assessment and Plan    Diagnoses and all orders for this visit:    1. Acute URI (Primary)  Comments:  Increase fluids. Mucinex and Tessalon PRN. Cover with antibiotics. RTC for worsened sx and if not improving in 1 week.  Orders:  -     benzonatate (Tessalon Perles) 100 MG capsule; Take 1 capsule by mouth 3 (Three) Times a Day As Needed for Cough.  Dispense: 30 capsule; Refill: 0  -     levocetirizine (XYZAL) 5 MG tablet; Take 1 tablet by mouth Every Evening.  Dispense: 90 tablet; Refill: 1  -     doxycycline (VIBRAMYCIN) 100 MG capsule; Take 1 capsule by mouth 2 (Two) Times a Day.  Dispense: 20 capsule; Refill: 0  -     predniSONE (DELTASONE) 20 MG tablet; Take 3 tablets by mouth Daily for 3 days, THEN 2 tablets Daily for 3 days, THEN 1 tablet Daily for 3 days.  Dispense: 18  tablet; Refill: 0    2. Chronic obstructive pulmonary disease, unspecified COPD type  Comments:  Albuterol PRN. Use Prednisone if wheezing worsens. RTC for worsened sx.  Orders:  -     predniSONE (DELTASONE) 20 MG tablet; Take 3 tablets by mouth Daily for 3 days, THEN 2 tablets Daily for 3 days, THEN 1 tablet Daily for 3 days.  Dispense: 18 tablet; Refill: 0                  Follow Up   Return in about 1 week (around 6/4/2025) for if not improving or sooner if symptoms worsen.  Patient was given instructions and counseling regarding her condition or for health maintenance advice. Please see specific information pulled into the AVS if appropriate.     SHAVON Mcclain

## 2025-06-19 ENCOUNTER — TELEPHONE (OUTPATIENT)
Dept: FAMILY MEDICINE CLINIC | Facility: CLINIC | Age: 50
End: 2025-06-19

## 2025-06-19 ENCOUNTER — CLINICAL SUPPORT (OUTPATIENT)
Dept: FAMILY MEDICINE CLINIC | Facility: CLINIC | Age: 50
End: 2025-06-19
Payer: COMMERCIAL

## 2025-06-19 VITALS — HEART RATE: 81 BPM | DIASTOLIC BLOOD PRESSURE: 76 MMHG | SYSTOLIC BLOOD PRESSURE: 124 MMHG

## 2025-06-19 DIAGNOSIS — I10 HYPERTENSION, UNSPECIFIED TYPE: Primary | ICD-10-CM

## 2025-07-21 DIAGNOSIS — Z17.0 MALIGNANT NEOPLASM OF OVERLAPPING SITES OF BOTH BREASTS IN FEMALE, ESTROGEN RECEPTOR POSITIVE: ICD-10-CM

## 2025-07-21 DIAGNOSIS — C50.811 MALIGNANT NEOPLASM OF OVERLAPPING SITES OF BOTH BREASTS IN FEMALE, ESTROGEN RECEPTOR POSITIVE: ICD-10-CM

## 2025-07-21 DIAGNOSIS — C50.811 MALIGNANT NEOPLASM OF OVERLAPPING SITES OF RIGHT BREAST IN FEMALE, ESTROGEN RECEPTOR NEGATIVE: ICD-10-CM

## 2025-07-21 DIAGNOSIS — Z17.1 MALIGNANT NEOPLASM OF OVERLAPPING SITES OF RIGHT BREAST IN FEMALE, ESTROGEN RECEPTOR NEGATIVE: ICD-10-CM

## 2025-07-21 DIAGNOSIS — C50.812 MALIGNANT NEOPLASM OF OVERLAPPING SITES OF BOTH BREASTS IN FEMALE, ESTROGEN RECEPTOR POSITIVE: ICD-10-CM

## 2025-07-21 RX ORDER — ALBUTEROL SULFATE 90 UG/1
2 INHALANT RESPIRATORY (INHALATION) EVERY 4 HOURS PRN
Qty: 18 G | Refills: 3 | Status: SHIPPED | OUTPATIENT
Start: 2025-07-21

## 2025-07-21 RX ORDER — ONDANSETRON 8 MG/1
8 TABLET, FILM COATED ORAL 3 TIMES DAILY PRN
Qty: 30 TABLET | Refills: 5 | Status: CANCELLED | OUTPATIENT
Start: 2025-07-21

## 2025-07-22 RX ORDER — VENLAFAXINE HYDROCHLORIDE 75 MG/1
75 CAPSULE, EXTENDED RELEASE ORAL DAILY
Qty: 30 CAPSULE | Refills: 0 | Status: SHIPPED | OUTPATIENT
Start: 2025-07-22

## 2025-08-07 ENCOUNTER — OFFICE VISIT (OUTPATIENT)
Dept: ONCOLOGY | Facility: CLINIC | Age: 50
End: 2025-08-07
Payer: COMMERCIAL

## 2025-08-07 VITALS
RESPIRATION RATE: 18 BRPM | HEIGHT: 64 IN | HEART RATE: 108 BPM | SYSTOLIC BLOOD PRESSURE: 136 MMHG | OXYGEN SATURATION: 97 % | BODY MASS INDEX: 33.29 KG/M2 | WEIGHT: 195 LBS | TEMPERATURE: 97.7 F | DIASTOLIC BLOOD PRESSURE: 89 MMHG

## 2025-08-07 DIAGNOSIS — Z79.811 AROMATASE INHIBITOR USE: ICD-10-CM

## 2025-08-07 DIAGNOSIS — C50.811 MALIGNANT NEOPLASM OF OVERLAPPING SITES OF RIGHT BREAST IN FEMALE, ESTROGEN RECEPTOR NEGATIVE: ICD-10-CM

## 2025-08-07 DIAGNOSIS — Z13.820 OSTEOPOROSIS SCREENING: ICD-10-CM

## 2025-08-07 DIAGNOSIS — Z17.1 MALIGNANT NEOPLASM OF OVERLAPPING SITES OF RIGHT BREAST IN FEMALE, ESTROGEN RECEPTOR NEGATIVE: ICD-10-CM

## 2025-08-07 DIAGNOSIS — Z78.0 POST-MENOPAUSAL: ICD-10-CM

## 2025-08-07 DIAGNOSIS — Z95.828 PORT-A-CATH IN PLACE: ICD-10-CM

## 2025-08-07 DIAGNOSIS — C50.812 MALIGNANT NEOPLASM OF OVERLAPPING SITES OF BOTH BREASTS IN FEMALE, ESTROGEN RECEPTOR POSITIVE: Primary | Chronic | ICD-10-CM

## 2025-08-07 DIAGNOSIS — C50.811 MALIGNANT NEOPLASM OF OVERLAPPING SITES OF BOTH BREASTS IN FEMALE, ESTROGEN RECEPTOR POSITIVE: Primary | Chronic | ICD-10-CM

## 2025-08-07 DIAGNOSIS — Z17.0 MALIGNANT NEOPLASM OF OVERLAPPING SITES OF BOTH BREASTS IN FEMALE, ESTROGEN RECEPTOR POSITIVE: Primary | Chronic | ICD-10-CM

## 2025-08-07 PROCEDURE — 99214 OFFICE O/P EST MOD 30 MIN: CPT | Performed by: NURSE PRACTITIONER

## 2025-08-07 RX ORDER — ONDANSETRON 4 MG/1
4 TABLET, FILM COATED ORAL EVERY 8 HOURS PRN
Qty: 30 TABLET | Refills: 1 | Status: SHIPPED | OUTPATIENT
Start: 2025-08-07

## 2025-08-07 RX ORDER — VENLAFAXINE HYDROCHLORIDE 75 MG/1
75 CAPSULE, EXTENDED RELEASE ORAL DAILY
Qty: 90 CAPSULE | Refills: 3 | Status: SHIPPED | OUTPATIENT
Start: 2025-08-07

## (undated) DEVICE — PROXIMATE RH ROTATING HEAD SKIN STAPLERS (35 WIDE) CONTAINS 35 STAINLESS STEEL STAPLES: Brand: PROXIMATE

## (undated) DEVICE — TRAP FLD MINIVAC MEGADYNE 100ML

## (undated) DEVICE — APPL CHLORAPREP TINTED 26ML TEAL

## (undated) DEVICE — HYPODERMIC SAFETY NEEDLE: Brand: MONOJECT

## (undated) DEVICE — JACKSON-PRATT 100CC BULB RESERVOIR: Brand: CARDINAL HEALTH

## (undated) DEVICE — DRSNG PAD ABD 8X10IN STRL

## (undated) DEVICE — DRSNG SURESITE WNDW 2.38X2.75

## (undated) DEVICE — DISH PETRI 3.5IN MD STRL LF

## (undated) DEVICE — CAMERA/LASER ARM DRAPE: Brand: DEROYAL

## (undated) DEVICE — HDRST POSTIN FM CRDL TRACH SLOT NONCOMRESS 9X8X4IN

## (undated) DEVICE — ELECTRD NDL EZ CLN MOD 2.75IN

## (undated) DEVICE — SUT SILK 3/0 TIES 18IN A184H

## (undated) DEVICE — BNDG,ELSTC,MATRIX,STRL,6"X5YD,LF,HOOK&LP: Brand: MEDLINE

## (undated) DEVICE — DRSNG SURESITE WNDW 4X4.5

## (undated) DEVICE — SUT SILK 2/0 PS 18IN 1588H

## (undated) DEVICE — 450 ML BOTTLE OF 0.05% CHLORHEXIDINE GLUCONATE IN 99.95% STERILE WATER FOR IRRIGATION, USP AND APPLICATOR.: Brand: IRRISEPT ANTIMICROBIAL WOUND LAVAGE

## (undated) DEVICE — SHEATH GUIDE SCOUT SURG TPR 8.3TO3.2CM 264CM STRL

## (undated) DEVICE — TBG PENCL TELESCP MEGADYNE SMOKE EVAC 10FT

## (undated) DEVICE — CVR HNDL LIGHT RIGID

## (undated) DEVICE — SYS CLS SKIN PREMIERPRO EXOFINFUSION 22CM

## (undated) DEVICE — SUT MNCRYL PLS ANTIB UD 4/0 PS2 18IN

## (undated) DEVICE — HDRST PAD EA/20PC

## (undated) DEVICE — LEX GENERAL BREAST: Brand: MEDLINE INDUSTRIES, INC.

## (undated) DEVICE — SUT SILK 3/0 30IN A304H

## (undated) DEVICE — GOWN,NON-REINFORCED,SIRUS,SET IN SLV,XL: Brand: MEDLINE

## (undated) DEVICE — ANTIBACTERIAL UNDYED BRAIDED (POLYGLACTIN 910), SYNTHETIC ABSORBABLE SUTURE: Brand: COATED VICRYL

## (undated) DEVICE — ELECTRD BLD EZ CLN MOD XLNG 2.75IN

## (undated) DEVICE — DRAIN JACKSON PRATT ROUND 15FR: Brand: CARDINAL HEALTH

## (undated) DEVICE — GLV SURG SENSICARE PI ORTHO SZ7.5 LF STRL